# Patient Record
Sex: MALE | Race: WHITE | NOT HISPANIC OR LATINO | Employment: FULL TIME | URBAN - METROPOLITAN AREA
[De-identification: names, ages, dates, MRNs, and addresses within clinical notes are randomized per-mention and may not be internally consistent; named-entity substitution may affect disease eponyms.]

---

## 2021-01-15 ENCOUNTER — TELEMEDICINE CONVERTED (OUTPATIENT)
Dept: CARDIOLOGY | Facility: CLINIC | Age: 51
End: 2021-01-15
Attending: INTERNAL MEDICINE

## 2021-01-21 ENCOUNTER — HOSPITAL ENCOUNTER (OUTPATIENT)
Dept: INFUSION THERAPY | Facility: HOSPITAL | Age: 51
Setting detail: HOSPITAL OUTPATIENT SURGERY
Discharge: HOME | End: 2021-01-21
Attending: INTERNAL MEDICINE

## 2021-01-21 LAB
ANION GAP SERPL CALC-SCNC: 15 MMOL/L (ref 8–19)
APTT BLD: 24.2 S (ref 22.2–34.2)
BASOPHILS # BLD AUTO: 0.05 10*3/UL (ref 0–0.2)
BASOPHILS NFR BLD AUTO: 0.9 % (ref 0–3)
BUN SERPL-MCNC: 8 MG/DL (ref 5–25)
BUN/CREAT SERPL: 10 {RATIO} (ref 6–20)
CALCIUM SERPL-MCNC: 8.4 MG/DL (ref 8.7–10.4)
CHLORIDE SERPL-SCNC: 100 MMOL/L (ref 99–111)
CONV ABS IMM GRAN: 0.01 10*3/UL (ref 0–0.2)
CONV CO2: 26 MMOL/L (ref 22–32)
CONV IMMATURE GRAN: 0.2 % (ref 0–1.8)
CREAT UR-MCNC: 0.79 MG/DL (ref 0.7–1.2)
DEPRECATED RDW RBC AUTO: 44.3 FL (ref 35.1–43.9)
EOSINOPHIL # BLD AUTO: 0.21 10*3/UL (ref 0–0.7)
EOSINOPHIL # BLD AUTO: 3.6 % (ref 0–7)
ERYTHROCYTE [DISTWIDTH] IN BLOOD BY AUTOMATED COUNT: 12.4 % (ref 11.6–14.4)
GFR SERPLBLD BASED ON 1.73 SQ M-ARVRAT: >60 ML/MIN/{1.73_M2}
GLUCOSE SERPL-MCNC: 247 MG/DL (ref 70–99)
HCT VFR BLD AUTO: 47.5 % (ref 42–52)
HGB BLD-MCNC: 16.5 G/DL (ref 14–18)
INR PPP: 0.93 (ref 2–3)
LYMPHOCYTES # BLD AUTO: 2.39 10*3/UL (ref 1–5)
LYMPHOCYTES NFR BLD AUTO: 41.1 % (ref 20–45)
MCH RBC QN AUTO: 33.5 PG (ref 27–31)
MCHC RBC AUTO-ENTMCNC: 34.7 G/DL (ref 33–37)
MCV RBC AUTO: 96.3 FL (ref 80–96)
MONOCYTES # BLD AUTO: 0.64 10*3/UL (ref 0.2–1.2)
MONOCYTES NFR BLD AUTO: 11 % (ref 3–10)
NEUTROPHILS # BLD AUTO: 2.52 10*3/UL (ref 2–8)
NEUTROPHILS NFR BLD AUTO: 43.2 % (ref 30–85)
NRBC CBCN: 0 % (ref 0–0.7)
OSMOLALITY SERPL CALC.SUM OF ELEC: 291 MOSM/KG (ref 273–304)
PLATELET # BLD AUTO: 255 10*3/UL (ref 130–400)
PMV BLD AUTO: 9.3 FL (ref 9.4–12.4)
POTASSIUM SERPL-SCNC: 4 MMOL/L (ref 3.5–5.3)
PROTHROMBIN TIME: 10.2 S (ref 9.4–12)
RBC # BLD AUTO: 4.93 10*6/UL (ref 4.7–6.1)
SODIUM SERPL-SCNC: 137 MMOL/L (ref 135–147)
WBC # BLD AUTO: 5.82 10*3/UL (ref 4.8–10.8)

## 2021-03-15 ENCOUNTER — OFFICE VISIT CONVERTED (OUTPATIENT)
Dept: CARDIOLOGY | Facility: CLINIC | Age: 51
End: 2021-03-15
Attending: INTERNAL MEDICINE

## 2021-05-14 VITALS
HEART RATE: 92 BPM | SYSTOLIC BLOOD PRESSURE: 152 MMHG | WEIGHT: 155 LBS | DIASTOLIC BLOOD PRESSURE: 92 MMHG | BODY MASS INDEX: 24.33 KG/M2 | HEIGHT: 67 IN

## 2021-05-14 NOTE — PROGRESS NOTES
Progress Note      Patient Name: Demetrio Mariano   Patient ID: [id number]   Sex: Male   YOB: 1970    Primary Care Provider: Kia Mayorga   Referring Provider: Kia Mayorga    Visit Date: March 15, 2021    Provider: Tommy Nagel MD   Location: [facility]   Location Address: [address] [place] [address], [address]   [place]  [id number]   Location Phone: [phone]          Chief Complaint     Followup visit for cardiomyopathy and congestive heart failure.       History Of Present Illness  REFERRING CARE PROVIDER: Kia Mariano is a 50 year old /White male with a dilated cardiomyopathy, nonobstructive coronary artery disease and diabetes mellitus who is here for follow-up visit. He was previously seen in December during hospital visit for congestive heart failure and uncontrolled hypertension. Echo showed left ventricular ejection fraction of 25 to 30%. He was started on appropriate medical therapy and since then he underwent cardiac catheterization. Cardiac cath done in January showed 70% stenosis involving diagonal branch but no other lesions. The cardiomyopathy is nonischemic and medical therapy was continued. Today, the patient reports feeling fine. He has shortness of breath on moderate exertion but no chest pain, palpitations, or pedal edema. There are no symptoms suggestive of orthopnea. Blood pressure remains on the higher side.   PAST MEDICAL HISTORY: 1) Dilated cardiomyopathy with LV ejection fraction of 20-25%. 2) Nonobstructive coronary artery disease involving diagonal branch per cardiac catheterization done on 01/21/2021. 3) Diabetes mellitus, on insulin pump. 4) History of medication noncompliance.   PSYCHOSOCIAL HISTORY: Moderate use of alcohol. Currently smokes 3/4 pack a day.   CURRENT MEDICATIONS: Medication list was reviewed and is as documented.      ALLERGIES: No known drug allergies.       Review of  "Systems  · Cardiovascular  o Admits  o : shortness of breath while walking or lying flat  o Denies  o : palpitations (fast, fluttering, or skipping beats), swelling (feet, ankles, hands), chest pain or angina pectoris   · Respiratory  o Denies  o : chronic or frequent cough      Vitals  Date Time BP Position Site L\R Cuff Size HR RR TEMP (F) WT  HT  BMI kg/m2 BSA m2 O2 Sat FR L/min FiO2 HC       03/15/2021 10:58 /92 Sitting    92 - R   155lbs 0oz 5'  7\" 24.28 1.82       03/15/2021 10:58 /96 Sitting    96 - R                   Physical Examination  · Respiratory  o Auscultation of Lungs  o : Clear to auscultation bilaterally. No crackles or rhonchi.  · Cardiovascular  o Heart  o : S1, S2 is normally heard. No S3. No murmur, rubs, or gallops.  · Gastrointestinal  o Abdominal Examination  o : Soft, nontender, nondistended. No free fluid. Bowel sounds heard in all four quadrants.  · Extremities  o Extremities  o : Warm and well perfused. No pitting pedal edema. Distal pulses present.          Assessment     1.  Cardiomyopathy. Left ventricular ejection fraction is 20 to 25%, currently not volume overloaded. We will maximize medical therapy. Today, we will increase carvedilol dose to 12.5 mg p.o. twice daily. Continue Entresto 49/51 mg twice daily along with Lasix. We will check repeat echocardiogram in May of this year to reassess the left ventricular function.   2.  Hypertension. Blood pressure is on the higher side. Increasing the dose of carvedilol as mentioned above.   3.  Nonobstructive coronary artery disease, stable with no angina, continue aspirin. Statin should be initiated as the next medication change.   4.  We will follow with repeat echocardiogram.           Tommy Nagel MD  JV/vh               Electronically Signed by: Katiuska Canchola-, OT -Author on April 1, 2021 09:28:34 AM  Electronically Co-signed by: Tommy Nagel MD -Reviewer on April 12, 2021 08:23:00 AM  "

## 2021-05-14 NOTE — PROGRESS NOTES
Progress Note      Patient Name: Demetrio Mariano   Patient ID: [id number]   Sex: Male   YOB: 1970        Visit Date: January 15, 2021    Provider: Tommy Nagel MD   Location: [facility]   Location Address: [address] [place] [address], [address]   [place]  [id number]   Location Phone: [phone]          Chief Complaint  · Cardiomyopathy   · Congestive heart failure   · Followup of recent hospital stay      History Of Present Illness  TELEHEALTH TELEPHONE VISIT  Demetrio Mariano is a 50-year-old male who is presenting for evaluation via telehealth telephone visit. Verbal consent obtained before beginning visit. The patient has history of insulin-dependent diabetes mellitus, hypertension, and anxiety disorder and was recently admitted to the hospital because of flash pulmonary edema. Further workup showed cardiomyopathy with LV ejection fraction of 20%. He was started on appropriate medications and was discharged home. A cardiac catheterization was planned before discharge, however, he tested positive for Covid, hence this elective procedure was rescheduled. The patient reports that he is feeling fine. No major shortness of breath at rest. Currently no orthopnea, pedal edema, or rapid weight gain, however, he gets short of breath very easily, even when walking 100 yards while going to work. He has tested negative for Covid twice since then and he is back to regular work. Denies any chest pain.   Provider spent 6 minutes with the patient during the telehealth visit.   The following staff were present during this visit: Provider only.   Past Medical History/ Overview of Patient Symptoms  PAST MEDICAL HISTORY: (1) Cardiomyopathy with LV ejection fraction of 20-25%. (2) Diabetes mellitus, on insulin pump. (3) History of medication noncompliance.   PSYCHOSOCIAL HISTORY: Current tobacco use. Still smokes 1/4 pack of cigarettes per day. Denies any significant alcohol use. No recreational  drug usage.   CURRENT MEDICATIONS: Novolog; aspirin 81 mg daily; Carvedilol 3.125 mg b.i.d.; Furosemide 20 mg daily; Losartan 50 mg daily.      ALLERGIES:  No known drug allergies.       Review of Systems  · Cardiovascular  o Admits  o : shortness of breath while walking or lying flat  o Denies  o : palpitations (fast, fluttering, or skipping beats), swelling (feet, ankles, hands), chest pain or angina pectoris   · Respiratory  o Denies  o : chronic or frequent cough      Vitals     Per patient, blood pressure 130-135/95, heart rate 100, oxygen saturation 96%.           Assessment     ASSESSMENT AND PLAN:  Cardiomyopathy. LV ejection fraction 20-25%. Currently on appropriate medical management. Needs up-titration of dosing. Etiology is unclear. The patient is a diabetic and has risk factors of coronary artery disease.  Will need a cardiac catheterization and coronary angiography to rule out coronary artery disease. The risks, benefits, and alternatives of the tests were explained to the patient and he agreed to proceed. We will schedule the test at the earliest. Since the patient had two recent negative Covid-19 tests, another Covid test is not required before the procedure. Continue beta-blockers, Lasix, and Losartan at the current dose.       Tommy Nagel MD  JV/pap             Electronically Signed by: Annita Abel-, Other -Author on January 20, 2021 02:15:02 PM  Electronically Co-signed by: Tommy Nagel MD -Reviewer on January 20, 2021 03:11:28 PM

## 2021-05-18 ENCOUNTER — OFFICE VISIT CONVERTED (OUTPATIENT)
Dept: CARDIOLOGY | Facility: CLINIC | Age: 51
End: 2021-05-18
Attending: INTERNAL MEDICINE

## 2021-05-18 ENCOUNTER — CONVERSION ENCOUNTER (OUTPATIENT)
Dept: CARDIOLOGY | Facility: CLINIC | Age: 51
End: 2021-05-18

## 2021-06-02 ENCOUNTER — TRANSCRIBE ORDERS (OUTPATIENT)
Dept: ADMINISTRATIVE | Facility: HOSPITAL | Age: 51
End: 2021-06-02

## 2021-06-02 ENCOUNTER — OFFICE VISIT CONVERTED (OUTPATIENT)
Dept: CARDIOLOGY | Facility: CLINIC | Age: 51
End: 2021-06-02
Attending: INTERNAL MEDICINE

## 2021-06-02 DIAGNOSIS — I42.8 NON-ISCHEMIC CARDIOMYOPATHY (HCC): Primary | ICD-10-CM

## 2021-06-02 DIAGNOSIS — R94.30 EJECTION FRACTION < 50%: ICD-10-CM

## 2021-06-02 PROBLEM — IMO0002 EJECTION FRACTION < 50%: Status: ACTIVE | Noted: 2021-06-02

## 2021-06-05 NOTE — PROGRESS NOTES
"   Progress Note      Patient Name: Demetrio Mariano   Patient ID: [id number]   Sex: Male   YOB: 1970    Primary Care Provider: Kia Mayorga   Referring Provider: Kia Mayorga    Visit Date: June 2, 2021    Provider: Regulo Coyne MD   Location: [facility]   Location Address: [address] [place] [address], [address]   [place], [place]  [id number]   Location Phone: [phone]          Chief Complaint     CHF.       History Of Present Illness  REFERRING CARE PROVIDER: Kia Mukesh   Demetrio Mariano is a 50 year old /White male with known nonischemic dilated cardiomyopathy, nonobstructive coronary artery disease and diabetes who has been undergoing medical therapy for the last 6 months and had stable shortness of breath on exertion as well as at times atypical chest pain.   PAST MEDICAL HISTORY: 1) Dilated cardiomyopathy with LV ejection fraction of 20-25%. 2) Nonobstructive coronary artery disease involving diagonal branch per cardiac catheterization done on 01/21/2021. 3) Diabetes mellitus, on insulin pump. 4) History of medication noncompliance.   PSYCHOSOCIAL HISTORY: Consumes alcohol daily. Currently smokes 1/2 pack per day.   CURRENT MEDICATIONS: Medications have been reviewed and are as documented.      ALLERGIES:  No known drug allergies.       Review of Systems  · Cardiovascular  o Admits  o : shortness of breath while walking or lying flat, chest pain or angina pectoris   o Denies  o : palpitations (fast, fluttering, or skipping beats), swelling (feet, ankles, hands)  · Respiratory  o Denies  o : chronic or frequent cough      Vitals  Date Time BP Position Site L\R Cuff Size HR RR TEMP (F) WT  HT  BMI kg/m2 BSA m2 O2 Sat FR L/min FiO2 HC       06/02/2021 09:46 AM         157lbs 16oz 5'  7\" 24.75 1.84             Physical Examination  · Constitutional  o Appearance  o : Awake, alert, in no acute distress.   · Eyes  o Conjunctivae  o : Normal.  · Ears, Nose, Mouth and " Throat  o Oral Cavity  o :   § Oral Mucosa  § : Normal.  · Neck  o Inspection/Palpation  o : No JVD. Good carotid upstroke. No thyromegaly.  · Respiratory  o Respiratory  o : Good respiratory effort. Clear to percussion and auscultation.  · Cardiovascular  o Heart  o :   § Auscultation of Heart  § : S1, S2 normal. Regular rate and rhythm without murmurs, gallops, or rubs.  o Peripheral Vascular System  o :   § Extremities  § : Good femoral and pedal pulses. No pedal edema.  · Gastrointestinal  o Abdominal Examination  o : Soft. No tenderness or masses felt. No hepatosplenomegaly. Abdominal aorta is not palpable.  · EKG  o EKG  o : Baseline EKG.  o Results  o : Shows normal sinus rhythm with left axis deviation with some LVH changes.  · Echocardiogram  o Echocardiogram  o : Most recent echocardiogram done in May 2021 showed a moderately reduced ejection fraction with an EF of 28%, left atrial enlargement, and mild LV enlargement as well.   · Data  o Data  o : He had a heart catheterization previously on 01/21/2021 showed nonocclusive coronary artery disease.           Assessment     Nonischemic cardiomyopathy with class 2 symptoms and persistently decreased ejection fraction below 30% despite maximum medical treatment including beta blocker, Entresto, and Lasix therapy. I feel that the patient does meet criteria for prophylactic ICD implantation. Discussed both transvenous as well as subcutaneous approaches. He preferred the transvenous due to concerns over the newer subcutaneous route. Will plan on proceeding in 2 weeks with a single chamber transvenous ICD.         Regulo Coyne MD  /               Electronically Signed by: Regulo Coyne MD -Author on [place] 3, 2021 01:32:28 PM

## 2021-06-05 NOTE — PROGRESS NOTES
Progress Note      Patient Name: Demetrio Mariano   Patient ID: [id number]   Sex: Male   YOB: 1970    Primary Care Provider: Kia Mayorga   Referring Provider: Kia Mayorga    Visit Date: May 18, 2021    Provider: Tommy Nagel MD   Location: [facility]   Location Address: [address] [place] [address], [address]   [place]  [id number]   Location Phone: [phone]          Chief Complaint     Followup visit for cardiomyopathy and congestive heart failure.       History Of Present Illness  REFERRING CARE PROVIDER: Kia Jiangtcher   Demetrio Mariano is a 50 year old /White male with dilated cardiomyopathy, nonobstructive coronary artery disease, diabetes mellitus who is here for a follow-up visit. He was previously seen on 03/15 and the dose of carvedilol was increased during that visit. Since then the patient has had repeat echocardiogram which again showed left ventricular ejection fraction of 25 to 30%. Today, the patient reports feeling fine, denies having any palpitations, chest pain, swelling of the feet. He does have shortness of breath on moderate exertion. He denies having any orthopnea. No recent rapid weight gain.   PAST MEDICAL HISTORY: 1) Dilated cardiomyopathy with LV ejection fraction of 20-25%. 2) Nonobstructive coronary artery disease involving diagonal branch per cardiac catheterization done on 01/21/2021. 3) Diabetes mellitus, on insulin pump. 4) History of medication noncompliance.   PSYCHOSOCIAL HISTORY: Moderate alcohol consumption. Currently smokes one pack per day.   CURRENT MEDICATIONS: Medications have been reviewed and are as stated.      ALLERGIES:  No known drug allergies.       Review of Systems  · Cardiovascular  o Admits  o : shortness of breath while walking or lying flat  o Denies  o : palpitations (fast, fluttering, or skipping beats), swelling (feet, ankles, hands), chest pain or angina pectoris   · Respiratory  o Denies  o : chronic or  "frequent cough      Vitals  Date Time BP Position Site L\R Cuff Size HR RR TEMP (F) WT  HT  BMI kg/m2 BSA m2 O2 Sat FR L/min FiO2 HC       05/18/2021 01:00 /78 Sitting    92 - R   159lbs 0oz 5'  7\" 24.9 1.85       05/18/2021 01:00 /84 Sitting                       Physical Examination  · Respiratory  o Auscultation of Lungs  o : Clear to auscultation bilaterally. No crackles or rhonchi.  · Cardiovascular  o Heart  o : S1, S2 is normally heard. No S3. No murmur, rubs, or gallops.  · Gastrointestinal  o Abdominal Examination  o : Soft, nontender, nondistended. No free fluid. Bowel sounds heard in all four quadrants.  · Extremities  o Extremities  o : Warm and well perfused. No pitting pedal edema. Distal pulses present.  · Echocardiogram  o Echocardiogram  o : Done on 05/03/2021 showed LV ejection fraction of 28%, grade 1 diastolic dysfunction, left atrial enlargement.           Assessment     1.  Nonischemic dilated cardiomyopathy. The patient is on appropriate medical therapy for close to 6 months. Left ventricular ejection fraction is still under 30%. He is not volume overloaded, however, he has been advised of class 2 symptoms. Because of persistently low ejection fraction on appropriate medical therapy he will be a candidate for ICD placement for primary prevention. The risks and benefits were briefly explained to the patient. We will make an appointment with Dr. Coyne to discuss regarding the ICD placement and risks and benefits of placement. In the meantime, we will increase the dose of carvedilol further to 25 mg twice daily and continue Entresto as it is. Continue Lasix at the current dose.  2.  Hypertension. Blood pressure is reasonably well controlled. Medication changes as mentioned above.    FOLLOW-UP: Follow-up with me in 6 months, but will be seen by Dr. Coyne in-between.        Tommy Nagel MD  JMILES/vh               Electronically Signed by: Katiuska Canchola-, OT -Author on May 30, " 2021 05:45:57 AM  Electronically Co-signed by: Tommy Nagel MD -Reviewer on May 30, 2021 02:52:15 PM

## 2021-06-15 ENCOUNTER — HOSPITAL ENCOUNTER (OUTPATIENT)
Facility: HOSPITAL | Age: 51
Discharge: HOME | End: 2021-06-16
Attending: INTERNAL MEDICINE | Admitting: INTERNAL MEDICINE

## 2021-06-15 ENCOUNTER — APPOINTMENT (OUTPATIENT)
Dept: GENERAL RADIOLOGY | Facility: HOSPITAL | Age: 51
End: 2021-06-15

## 2021-06-15 ENCOUNTER — APPOINTMENT (OUTPATIENT)
Dept: INFUSION THERAPY | Facility: HOSPITAL | Age: 51
End: 2021-06-15

## 2021-06-15 DIAGNOSIS — I42.8 NON-ISCHEMIC CARDIOMYOPATHY (HCC): ICD-10-CM

## 2021-06-15 DIAGNOSIS — R94.30 EJECTION FRACTION < 50%: ICD-10-CM

## 2021-06-15 LAB
ANION GAP SERPL CALCULATED.3IONS-SCNC: 8 MMOL/L (ref 5–15)
BASOPHILS # BLD AUTO: 0.06 10*3/MM3 (ref 0–0.2)
BASOPHILS NFR BLD AUTO: 0.7 % (ref 0–1.5)
BUN SERPL-MCNC: 11 MG/DL (ref 6–20)
BUN/CREAT SERPL: 13.3 (ref 7–25)
CALCIUM SPEC-SCNC: 9.2 MG/DL (ref 8.6–10.5)
CHLORIDE SERPL-SCNC: 99 MMOL/L (ref 98–107)
CO2 SERPL-SCNC: 31 MMOL/L (ref 22–29)
CREAT SERPL-MCNC: 0.83 MG/DL (ref 0.76–1.27)
DEPRECATED RDW RBC AUTO: 47.2 FL (ref 37–54)
EOSINOPHIL # BLD AUTO: 0.27 10*3/MM3 (ref 0–0.4)
EOSINOPHIL NFR BLD AUTO: 3.3 % (ref 0.3–6.2)
ERYTHROCYTE [DISTWIDTH] IN BLOOD BY AUTOMATED COUNT: 13.5 % (ref 12.3–15.4)
GFR SERPL CREATININE-BSD FRML MDRD: 98 ML/MIN/1.73
GLUCOSE BLDC GLUCOMTR-MCNC: 239 MG/DL (ref 70–130)
GLUCOSE BLDC GLUCOMTR-MCNC: 326 MG/DL (ref 70–130)
GLUCOSE SERPL-MCNC: 304 MG/DL (ref 65–99)
HCT VFR BLD AUTO: 56 % (ref 37.5–51)
HGB BLD-MCNC: 19.3 G/DL (ref 13–17.7)
IMM GRANULOCYTES # BLD AUTO: 0.02 10*3/MM3 (ref 0–0.05)
IMM GRANULOCYTES NFR BLD AUTO: 0.2 % (ref 0–0.5)
INR PPP: 0.97 (ref 2–3)
LYMPHOCYTES # BLD AUTO: 2.65 10*3/MM3 (ref 0.7–3.1)
LYMPHOCYTES NFR BLD AUTO: 32.7 % (ref 19.6–45.3)
MCH RBC QN AUTO: 32.3 PG (ref 26.6–33)
MCHC RBC AUTO-ENTMCNC: 34.5 G/DL (ref 31.5–35.7)
MCV RBC AUTO: 93.8 FL (ref 79–97)
MONOCYTES # BLD AUTO: 0.88 10*3/MM3 (ref 0.1–0.9)
MONOCYTES NFR BLD AUTO: 10.9 % (ref 5–12)
NEUTROPHILS NFR BLD AUTO: 4.22 10*3/MM3 (ref 1.7–7)
NEUTROPHILS NFR BLD AUTO: 52.2 % (ref 42.7–76)
NRBC BLD AUTO-RTO: 0 /100 WBC (ref 0–0.2)
PLATELET # BLD AUTO: 209 10*3/MM3 (ref 140–450)
PMV BLD AUTO: 10 FL (ref 6–12)
POTASSIUM SERPL-SCNC: 4.5 MMOL/L (ref 3.5–5.2)
PROTHROMBIN TIME: 10.7 SECONDS (ref 9.4–12)
QT INTERVAL: 405 MS
RBC # BLD AUTO: 5.97 10*6/MM3 (ref 4.14–5.8)
SODIUM SERPL-SCNC: 138 MMOL/L (ref 136–145)
WBC # BLD AUTO: 8.1 10*3/MM3 (ref 3.4–10.8)

## 2021-06-15 PROCEDURE — 82962 GLUCOSE BLOOD TEST: CPT

## 2021-06-15 PROCEDURE — 85025 COMPLETE CBC W/AUTO DIFF WBC: CPT | Performed by: INTERNAL MEDICINE

## 2021-06-15 PROCEDURE — 25010000003 CEFAZOLIN IN DEXTROSE 2-4 GM/100ML-% SOLUTION: Performed by: INTERNAL MEDICINE

## 2021-06-15 PROCEDURE — 33249 INSJ/RPLCMT DEFIB W/LEAD(S): CPT | Performed by: INTERNAL MEDICINE

## 2021-06-15 PROCEDURE — C1777 LEAD, AICD, ENDO SINGLE COIL: HCPCS | Performed by: INTERNAL MEDICINE

## 2021-06-15 PROCEDURE — C1892 INTRO/SHEATH,FIXED,PEEL-AWAY: HCPCS | Performed by: INTERNAL MEDICINE

## 2021-06-15 PROCEDURE — 25010000002 MIDAZOLAM PER 1MG: Performed by: INTERNAL MEDICINE

## 2021-06-15 PROCEDURE — 93005 ELECTROCARDIOGRAM TRACING: CPT | Performed by: INTERNAL MEDICINE

## 2021-06-15 PROCEDURE — 85610 PROTHROMBIN TIME: CPT | Performed by: INTERNAL MEDICINE

## 2021-06-15 PROCEDURE — 0 IOPAMIDOL PER 1 ML: Performed by: INTERNAL MEDICINE

## 2021-06-15 PROCEDURE — 25010000002 LORAZEPAM PER 2 MG: Performed by: INTERNAL MEDICINE

## 2021-06-15 PROCEDURE — 71045 X-RAY EXAM CHEST 1 VIEW: CPT

## 2021-06-15 PROCEDURE — C1894 INTRO/SHEATH, NON-LASER: HCPCS | Performed by: INTERNAL MEDICINE

## 2021-06-15 PROCEDURE — 25010000002 FENTANYL CITRATE (PF) 50 MCG/ML SOLUTION: Performed by: INTERNAL MEDICINE

## 2021-06-15 PROCEDURE — C1722 AICD, SINGLE CHAMBER: HCPCS | Performed by: INTERNAL MEDICINE

## 2021-06-15 PROCEDURE — 80048 BASIC METABOLIC PNL TOTAL CA: CPT | Performed by: INTERNAL MEDICINE

## 2021-06-15 PROCEDURE — 25010000002 MIDAZOLAM PER 1 MG: Performed by: INTERNAL MEDICINE

## 2021-06-15 DEVICE — IMPLANTABLE DEVICE: Type: IMPLANTABLE DEVICE | Status: FUNCTIONAL

## 2021-06-15 DEVICE — IMPLANTABLE CARDIOVERTER DEFIBRILLATOR VR
Type: IMPLANTABLE DEVICE | Status: FUNCTIONAL
Brand: VIGILANT™ EL ICD VR

## 2021-06-15 RX ORDER — CEFAZOLIN SODIUM 2 G/100ML
2 INJECTION, SOLUTION INTRAVENOUS ONCE
Status: COMPLETED | OUTPATIENT
Start: 2021-06-15 | End: 2021-06-16

## 2021-06-15 RX ORDER — ACETAMINOPHEN 650 MG/1
650 SUPPOSITORY RECTAL EVERY 4 HOURS PRN
Status: DISCONTINUED | OUTPATIENT
Start: 2021-06-15 | End: 2021-06-16 | Stop reason: HOSPADM

## 2021-06-15 RX ORDER — ACETAMINOPHEN 325 MG/1
650 TABLET ORAL EVERY 4 HOURS PRN
Status: DISCONTINUED | OUTPATIENT
Start: 2021-06-15 | End: 2021-06-16 | Stop reason: HOSPADM

## 2021-06-15 RX ORDER — DEXTROSE MONOHYDRATE 100 MG/ML
25 INJECTION, SOLUTION INTRAVENOUS
Status: DISCONTINUED | OUTPATIENT
Start: 2021-06-15 | End: 2021-06-16 | Stop reason: HOSPADM

## 2021-06-15 RX ORDER — MIDAZOLAM HYDROCHLORIDE 1 MG/ML
INJECTION INTRAMUSCULAR; INTRAVENOUS AS NEEDED
Status: DISCONTINUED | OUTPATIENT
Start: 2021-06-15 | End: 2021-06-15 | Stop reason: HOSPADM

## 2021-06-15 RX ORDER — CEFAZOLIN SODIUM 2 G/100ML
2 INJECTION, SOLUTION INTRAVENOUS EVERY 8 HOURS
Status: COMPLETED | OUTPATIENT
Start: 2021-06-15 | End: 2021-06-16

## 2021-06-15 RX ORDER — FENTANYL CITRATE 50 UG/ML
INJECTION, SOLUTION INTRAMUSCULAR; INTRAVENOUS AS NEEDED
Status: DISCONTINUED | OUTPATIENT
Start: 2021-06-15 | End: 2021-06-15 | Stop reason: HOSPADM

## 2021-06-15 RX ORDER — BUPIVACAINE HYDROCHLORIDE 5 MG/ML
INJECTION, SOLUTION EPIDURAL; INTRACAUDAL AS NEEDED
Status: DISCONTINUED | OUTPATIENT
Start: 2021-06-15 | End: 2021-06-15 | Stop reason: HOSPADM

## 2021-06-15 RX ORDER — ONDANSETRON 2 MG/ML
4 INJECTION INTRAMUSCULAR; INTRAVENOUS EVERY 6 HOURS PRN
Status: DISCONTINUED | OUTPATIENT
Start: 2021-06-15 | End: 2021-06-16 | Stop reason: HOSPADM

## 2021-06-15 RX ORDER — NICOTINE POLACRILEX 4 MG
15 LOZENGE BUCCAL
Status: DISCONTINUED | OUTPATIENT
Start: 2021-06-15 | End: 2021-06-16 | Stop reason: HOSPADM

## 2021-06-15 RX ORDER — MIDAZOLAM HYDROCHLORIDE 2 MG/2ML
INJECTION, SOLUTION INTRAMUSCULAR; INTRAVENOUS AS NEEDED
Status: DISCONTINUED | OUTPATIENT
Start: 2021-06-15 | End: 2021-06-15 | Stop reason: HOSPADM

## 2021-06-15 RX ORDER — SODIUM CHLORIDE 0.9 % (FLUSH) 0.9 %
3 SYRINGE (ML) INJECTION EVERY 12 HOURS SCHEDULED
Status: DISCONTINUED | OUTPATIENT
Start: 2021-06-15 | End: 2021-06-16 | Stop reason: HOSPADM

## 2021-06-15 RX ORDER — FUROSEMIDE 20 MG/1
20 TABLET ORAL DAILY
COMMUNITY
End: 2022-05-31 | Stop reason: SDUPTHER

## 2021-06-15 RX ORDER — SACUBITRIL AND VALSARTAN 49; 51 MG/1; MG/1
1 TABLET, FILM COATED ORAL 2 TIMES DAILY
COMMUNITY
End: 2021-07-08 | Stop reason: SDUPTHER

## 2021-06-15 RX ORDER — LIDOCAINE HYDROCHLORIDE 20 MG/ML
INJECTION, SOLUTION INFILTRATION; PERINEURAL AS NEEDED
Status: DISCONTINUED | OUTPATIENT
Start: 2021-06-15 | End: 2021-06-15 | Stop reason: HOSPADM

## 2021-06-15 RX ORDER — LORAZEPAM 2 MG/ML
INJECTION INTRAMUSCULAR AS NEEDED
Status: DISCONTINUED | OUTPATIENT
Start: 2021-06-15 | End: 2021-06-15 | Stop reason: HOSPADM

## 2021-06-15 RX ORDER — TEMAZEPAM 15 MG/1
15 CAPSULE ORAL NIGHTLY PRN
Status: DISCONTINUED | OUTPATIENT
Start: 2021-06-15 | End: 2021-06-16 | Stop reason: HOSPADM

## 2021-06-15 RX ORDER — SODIUM CHLORIDE 0.9 % (FLUSH) 0.9 %
10 SYRINGE (ML) INJECTION AS NEEDED
Status: DISCONTINUED | OUTPATIENT
Start: 2021-06-15 | End: 2021-06-16 | Stop reason: HOSPADM

## 2021-06-15 RX ORDER — CARVEDILOL 25 MG/1
25 TABLET ORAL 2 TIMES DAILY WITH MEALS
COMMUNITY
End: 2021-11-29

## 2021-06-15 RX ADMIN — CEFAZOLIN SODIUM 2 G: 2 INJECTION, SOLUTION INTRAVENOUS at 16:58

## 2021-06-15 RX ADMIN — Medication 3.5 UNITS: at 17:49

## 2021-06-15 RX ADMIN — CEFAZOLIN SODIUM 2 G: 2 INJECTION, SOLUTION INTRAVENOUS at 08:54

## 2021-06-15 NOTE — PLAN OF CARE
Goal Outcome Evaluation:  Plan of Care Reviewed With: patient        Progress: improving  Outcome Summary: Patient received ICD, heart rhythm remains sinus rhythm in the 80s. Site covered with dressing and intact. No complications noted at this time.

## 2021-06-15 NOTE — H&P
"Seen and agree with above  Progress Note        Patient Name: Demetrio Mariano   Patient ID: [id number]   Sex: Male   YOB: 1970    Primary Care Provider: Kia Mayorga   Referring Provider: Kia Mayorga    Visit Date: June 2, 2021    Provider: Regulo Coyne MD   Location: [facility]   Location Address: [address] [place] [address], [address]   [place], [place]  [id number]   Location Phone: [phone]           Chief Complaint      CHF.        History Of Present Illness  REFERRING CARE PROVIDER: Kiaaziza JiangMukesh   Demetrio Mariano is a 50 year old /White male with known nonischemic dilated cardiomyopathy, nonobstructive coronary artery disease and diabetes who has been undergoing medical therapy for the last 6 months and had stable shortness of breath on exertion as well as at times atypical chest pain.   PAST MEDICAL HISTORY: 1) Dilated cardiomyopathy with LV ejection fraction of 20-25%. 2) Nonobstructive coronary artery disease involving diagonal branch per cardiac catheterization done on 01/21/2021. 3) Diabetes mellitus, on insulin pump. 4) History of medication noncompliance.   PSYCHOSOCIAL HISTORY: Consumes alcohol daily. Currently smokes 1/2 pack per day.   CURRENT MEDICATIONS: Medications have been reviewed and are as documented.       ALLERGIES:  No known drug allergies.        Review of Systems  · Cardiovascular  · Admits  · : shortness of breath while walking or lying flat, chest pain or angina pectoris   · Denies  · : palpitations (fast, fluttering, or skipping beats), swelling (feet, ankles, hands)  · Respiratory  · Denies  · : chronic or frequent cough       Vitals                                       Date Time BP Position Site L\R Cuff Size HR RR TEMP (F) WT  HT  BMI kg/m2 BSA m2 O2 Sat FR L/min FiO2         06/02/2021 09:46 AM                 157lbs 16oz 5'  7\" 24.75 1.84                   Physical Examination  · Constitutional  · Appearance  · : Awake, alert, in no acute " distress.   · Eyes  · Conjunctivae  · : Normal.  · Ears, Nose, Mouth and Throat  · Oral Cavity  · :   · Oral Mucosa  · : Normal.  · Neck  · Inspection/Palpation  · : No JVD. Good carotid upstroke. No thyromegaly.  · Respiratory  · Respiratory  · : Good respiratory effort. Clear to percussion and auscultation.  · Cardiovascular  · Heart  · :   · Auscultation of Heart  · : S1, S2 normal. Regular rate and rhythm without murmurs, gallops, or rubs.  · Peripheral Vascular System  · :   · Extremities  · : Good femoral and pedal pulses. No pedal edema.  · Gastrointestinal  · Abdominal Examination  · : Soft. No tenderness or masses felt. No hepatosplenomegaly. Abdominal aorta is not palpable.  · EKG  · EKG  · : Baseline EKG.  · Results  · : Shows normal sinus rhythm with left axis deviation with some LVH changes.  · Echocardiogram  · Echocardiogram  · : Most recent echocardiogram done in May 2021 showed a moderately reduced ejection fraction with an EF of 28%, left atrial enlargement, and mild LV enlargement as well.   · Data  · Data  · : He had a heart catheterization previously on 01/21/2021 showed nonocclusive coronary artery disease.             Assessment      Nonischemic cardiomyopathy with class 2 symptoms and persistently decreased ejection fraction below 30% despite maximum medical treatment including beta blocker, Entresto, and Lasix therapy. I feel that the patient does meet criteria for prophylactic ICD implantation. Discussed both transvenous as well as subcutaneous approaches. He preferred the transvenous due to concerns over the newer subcutaneous route. Will plan on proceeding in 2 weeks with a single chamber transvenous ICD.         Regulo Coyne MD  /                 Electronically Signed by: Regulo Coyne MD -Author on [place] 3, 2021 01:32:28 PM               Last signed by: Regulo Coyne MD at 06/02/21 4202     Seen and agree with above no changes

## 2021-06-16 VITALS
OXYGEN SATURATION: 98 % | HEART RATE: 83 BPM | RESPIRATION RATE: 14 BRPM | HEIGHT: 67 IN | BODY MASS INDEX: 23.54 KG/M2 | SYSTOLIC BLOOD PRESSURE: 153 MMHG | WEIGHT: 150 LBS | DIASTOLIC BLOOD PRESSURE: 80 MMHG | TEMPERATURE: 98.1 F

## 2021-06-16 LAB
GLUCOSE BLDC GLUCOMTR-MCNC: 234 MG/DL (ref 70–130)
GLUCOSE BLDC GLUCOMTR-MCNC: 68 MG/DL (ref 70–130)

## 2021-06-16 PROCEDURE — 25010000003 CEFAZOLIN IN DEXTROSE 2-4 GM/100ML-% SOLUTION: Performed by: INTERNAL MEDICINE

## 2021-06-16 PROCEDURE — 82962 GLUCOSE BLOOD TEST: CPT

## 2021-06-16 RX ORDER — CEPHALEXIN 750 MG/1
750 CAPSULE ORAL 3 TIMES DAILY
Qty: 12 CAPSULE | Refills: 0 | Status: SHIPPED | OUTPATIENT
Start: 2021-06-16 | End: 2021-09-23

## 2021-06-16 RX ADMIN — CEFAZOLIN SODIUM 2 G: 2 INJECTION, SOLUTION INTRAVENOUS at 00:16

## 2021-06-16 RX ADMIN — SODIUM CHLORIDE, PRESERVATIVE FREE 3 ML: 5 INJECTION INTRAVENOUS at 10:24

## 2021-06-17 NOTE — PROCEDURES
Pacemaker procedure note     Procedure: Single-chamber ICD     Indication: Nonischemic cardiomyopathy EF less than 30%     Complications: None     Description: Written informed consent was obtained.  The patient was brought to the cardiac catheterization lab in a fasting state.  Prepped and draped sterilely for left subclavian access.  Preoperative antibiotics were administered.  Local anesthesia was infiltrated around the subclavian area and conscious sedation was administered.  An iv venogram was done to confirm patency of the axillary vein. An incision was then made and carried down to the pectoral fascial plane.  A pocket was made in the pectoral fascial plane with both electrocautery and blunt dissection.  Using a micropuncture kit I gained access once to the left subclavian/axillary vein and J-tipped guidewires were placed.  One 8 [place] sheath were placed over the wires and the wires removed.  Under fluoroscopy the right ventricular lead was delivered to the right ventricular septal area using a combination of straight and curved stylets.  The pacing and sensing parameters were acceptable.  The slack was optimized.  The sheath was removed.  The lead was sewn into the pocket using 0-silk suture. The lead were connected to the pulse generator and the torque screws were tightened.  The pocket was washed with normal saline solution.  Hemostasis appeared excellent.  The lead slack and generator were placed into the pocket and the pocket was closed with a  2-0 vicryl layer for the fasica and 4-0 Vicryl suture for the subcuticular layer.  Counts were correct.  The patient tolerated the procedure well.  There were no early complications.       Blood loss minimal  Device implanted: [employer] vigilant model number D232        Right ventricular lead: [place] active-fixation model #0672     Ventricular sensing 8.7 mV     Threshold was 0.0.5 V at 0.4 ms pacing mode VV I low rate limit of  40        Conclusions:  Single-chamber ICD implantation  Management plan:  Monitored overnight will get chest x-ray and repeat interrogation in a.m.     The patient will be observed overnight, the device will be interrogated in the morning, outpatient follow-up will be arranged

## 2021-06-23 ENCOUNTER — CLINICAL SUPPORT NO REQUIREMENTS (OUTPATIENT)
Dept: CARDIOLOGY | Facility: CLINIC | Age: 51
End: 2021-06-23

## 2021-06-23 DIAGNOSIS — I42.8 NON-ISCHEMIC CARDIOMYOPATHY (HCC): ICD-10-CM

## 2021-06-23 DIAGNOSIS — Z95.810 AICD (AUTOMATIC CARDIOVERTER/DEFIBRILLATOR) PRESENT: ICD-10-CM

## 2021-06-23 DIAGNOSIS — I48.21 PERMANENT ATRIAL FIBRILLATION (HCC): Primary | ICD-10-CM

## 2021-06-23 PROCEDURE — 93283 PRGRMG EVAL IMPLANTABLE DFB: CPT | Performed by: INTERNAL MEDICINE

## 2021-07-11 RX ORDER — SACUBITRIL AND VALSARTAN 49; 51 MG/1; MG/1
TABLET, FILM COATED ORAL
Qty: 60 TABLET | Refills: 11 | Status: SHIPPED | OUTPATIENT
Start: 2021-07-11 | End: 2021-09-23 | Stop reason: DRUGHIGH

## 2021-07-15 VITALS
WEIGHT: 159 LBS | HEIGHT: 67 IN | DIASTOLIC BLOOD PRESSURE: 78 MMHG | HEART RATE: 92 BPM | BODY MASS INDEX: 24.96 KG/M2 | SYSTOLIC BLOOD PRESSURE: 142 MMHG

## 2021-07-15 VITALS — WEIGHT: 158 LBS | BODY MASS INDEX: 24.8 KG/M2 | HEIGHT: 67 IN

## 2021-08-02 ENCOUNTER — TELEPHONE (OUTPATIENT)
Dept: CARDIOLOGY | Facility: CLINIC | Age: 51
End: 2021-08-02

## 2021-08-02 NOTE — TELEPHONE ENCOUNTER
I received an alert that patients heart failure index is at 16, anything above 6 is high.  I do not see that he is on a Dieretic, however I wanted to confirm and also see if he was experiencing any problems.

## 2021-08-04 NOTE — TELEPHONE ENCOUNTER
S/W patient. Denied SOB or edema. Stated has had a 2-3 pound weight gain over the past several weeks. Patient states that he does not weigh himself daily. Advised patient that I would discuss with Dr. Nagel and call back with recommendations.    Instructed patient of importance of weighing daily. Advised patient to start weighing himself first thing in the mornings and to call with a weight gain of more than 2lbs overnight or 5 lbs within a week. Patient verbalized understanding.

## 2021-08-04 NOTE — TELEPHONE ENCOUNTER
Can you please call this patient and ask about symptoms, specifically shortness of breath, recent weight gain and pedal edema.     The heart failure index can be nonspecific and no further interventions needed if patient has no new symptoms.

## 2021-08-05 DIAGNOSIS — I42.8 NON-ISCHEMIC CARDIOMYOPATHY (HCC): Primary | ICD-10-CM

## 2021-08-06 RX ORDER — FUROSEMIDE 20 MG/1
20 TABLET ORAL DAILY
Qty: 90 TABLET | Refills: 3 | Status: SHIPPED | OUTPATIENT
Start: 2021-08-06 | End: 2021-09-23 | Stop reason: SDUPTHER

## 2021-08-06 NOTE — TELEPHONE ENCOUNTER
Recommend to restart taking Lasix 20 mg p.o. once daily.    Continue all other medications as it is      Need a repeat BMP done in 1 week after restarting Lasix.

## 2021-08-27 ENCOUNTER — TELEPHONE (OUTPATIENT)
Dept: CARDIOLOGY | Facility: CLINIC | Age: 51
End: 2021-08-27

## 2021-08-30 ENCOUNTER — TELEPHONE (OUTPATIENT)
Dept: CARDIOLOGY | Facility: CLINIC | Age: 51
End: 2021-08-30

## 2021-08-30 NOTE — TELEPHONE ENCOUNTER
Red Alert via Home remote of CHF being level 16, anything above 6 is high.  He previously was started on Lasix 20 mg qd.  Is he taking medication?  Has he gained any weight or experiencing SOB?

## 2021-08-31 NOTE — TELEPHONE ENCOUNTER
Patient returned call. Confirmed still taking Lasix 20 mg daily. Stated that he hasn't noticed any swelling but has noticed some tightness in his abdomen. Stated that his weight went up to 157 a couple of weeks ago but it is coming back down and this morning his weight was 153. Stated that his baseline weight is 151.    Advised patient that I would discuss with Dr. Nagel and call back with recommendations.

## 2021-09-22 PROBLEM — I10 HYPERTENSION, ESSENTIAL: Chronic | Status: ACTIVE | Noted: 2021-09-22

## 2021-09-22 PROBLEM — Z95.810 ICD (IMPLANTABLE CARDIOVERTER-DEFIBRILLATOR), DUAL, IN SITU: Chronic | Status: ACTIVE | Noted: 2021-09-22

## 2021-09-22 PROBLEM — I42.8 NON-ISCHEMIC CARDIOMYOPATHY: Chronic | Status: ACTIVE | Noted: 2021-05-03

## 2021-09-22 PROBLEM — I25.10 CORONARY ARTERY DISEASE INVOLVING NATIVE HEART WITHOUT ANGINA PECTORIS: Chronic | Status: ACTIVE | Noted: 2021-01-21

## 2021-09-22 PROBLEM — I25.10 CORONARY ARTERY DISEASE INVOLVING NATIVE HEART WITHOUT ANGINA PECTORIS: Chronic | Status: ACTIVE | Noted: 2021-09-22

## 2021-09-23 ENCOUNTER — OFFICE VISIT (OUTPATIENT)
Dept: CARDIOLOGY | Facility: CLINIC | Age: 51
End: 2021-09-23

## 2021-09-23 VITALS
BODY MASS INDEX: 25.11 KG/M2 | SYSTOLIC BLOOD PRESSURE: 146 MMHG | WEIGHT: 160 LBS | HEIGHT: 67 IN | DIASTOLIC BLOOD PRESSURE: 80 MMHG | HEART RATE: 81 BPM

## 2021-09-23 DIAGNOSIS — I25.10 CORONARY ARTERY DISEASE INVOLVING NATIVE HEART WITHOUT ANGINA PECTORIS, UNSPECIFIED VESSEL OR LESION TYPE: Primary | Chronic | ICD-10-CM

## 2021-09-23 DIAGNOSIS — E78.5 HYPERLIPIDEMIA, UNSPECIFIED HYPERLIPIDEMIA TYPE: ICD-10-CM

## 2021-09-23 DIAGNOSIS — F17.219 CIGARETTE NICOTINE DEPENDENCE WITH NICOTINE-INDUCED DISORDER: Chronic | ICD-10-CM

## 2021-09-23 DIAGNOSIS — I10 HYPERTENSION, ESSENTIAL: Chronic | ICD-10-CM

## 2021-09-23 DIAGNOSIS — Z95.810 ICD (IMPLANTABLE CARDIOVERTER-DEFIBRILLATOR), DUAL, IN SITU: Chronic | ICD-10-CM

## 2021-09-23 DIAGNOSIS — I42.8 NON-ISCHEMIC CARDIOMYOPATHY (HCC): Chronic | ICD-10-CM

## 2021-09-23 PROCEDURE — 99214 OFFICE O/P EST MOD 30 MIN: CPT | Performed by: NURSE PRACTITIONER

## 2021-09-23 RX ORDER — ATORVASTATIN CALCIUM 10 MG/1
10 TABLET, FILM COATED ORAL DAILY
Qty: 90 TABLET | Refills: 3 | Status: SHIPPED | OUTPATIENT
Start: 2021-09-23 | End: 2022-05-31 | Stop reason: SDUPTHER

## 2021-09-23 NOTE — ASSESSMENT & PLAN NOTE
Improving with restarting of furosemide.  We are going to try to maximize his medications by increasing Entresto 49/51 1 tab in the morning and 2 tabs at night for 1 week and then 2 tabs twice daily until the bottle is done.  Then he will start Entresto 97/103mg 1 tab twice daily.  We will check a BMP in 1 month.  He is instructed to call if he has any issues with dizziness or side effects.  Continue Lasix 20 mg a day and carvedilol 25 twice daily.    Repeat echo in 6 months.

## 2021-09-23 NOTE — ASSESSMENT & PLAN NOTE
Needs tighter control.  Increase Entresto as directed.  Continue Lasix 20 mg a day and carvedilol 25 twice daily.

## 2021-09-23 NOTE — PROGRESS NOTES
Chief Complaint  Follow-up, Non-ischemic cardiomyopathy, Coronary Artery Disease, ICD, and Hypertension    Subjective            Demetrio Mariano presents to [facility] CARDIOLOGY  50-year-old white male who states he ran out of his Lasix a while back.  Started noticing more fluid retention and increasing shortness of breath so he got back on the Lasix.  States his shortness of breath is improving. Denies chest pain, palpitations, swelling, dizziness, syncope, PND, and orthopnea.  Continues to smoke a pack a day.  States it starts and stops periodically.  He does have nicotine patches at home.  He also states he has been on a cholesterol medicine in the past but not sure why he is not on it now.  He may have been on it when he had labs done in December.  He has had both Covid vaccines.              Past History:    Past Medical History:   Diagnosis Date   • CHF (congestive heart failure) (CMS/HCC)    • Cigarette nicotine dependence with nicotine-induced disorder 9/23/2021   • Coronary artery disease involving native heart without angina pectoris 1/21/2021     Nonobstructive coronary artery disease involving diagonal branch per cardiac catheterization done on 01/21/2021   • Diabetes mellitus (CMS/HCC)     type 2, Insulin pump   • Elevated cholesterol    • Hypertension    • ICD (implantable cardioverter-defibrillator), dual, in situ 9/22/2021   • Non-ischemic cardiomyopathy (CMS/HCC) 5/3/2021    Mildly dilated left ventricular cavity with severe global hypokinesis with estimated LV ejection fraction of       28%. On echo May 3, 2021   • Peyronie's disease         Family History: family history includes Diabetes in an other family member.     Social History: reports that he has been smoking cigarettes. He has been smoking about 1.00 pack per day. He has never used smokeless tobacco. He reports current alcohol use. He reports previous drug use.    Allergies: Patient has no known allergies.      Past  Surgical History:   Procedure Laterality Date   • CARDIAC CATHETERIZATION     • CARDIAC ELECTROPHYSIOLOGY PROCEDURE N/A 6/15/2021    Procedure: ICD SC new;  Surgeon: Regulo Coyne MD;  Location: [facility] INVASIVE LOC[facility];  Service: Cardiovascular;  Laterality: N/A;   • FRACTURE SURGERY     • OTHER SURGICAL HISTORY  06/10/2013    crporal plication   • VASECTOMY          Prior to Admission medications    Medication Sig Start Date End Date Taking? Authorizing Provider   BABY ASPIRIN PO Take 81 mg by mouth Daily.   Yes Mary Gonzalez MD   carvedilol (COREG) 25 MG tablet Take 25 mg by mouth 2 (Two) Times a Day With Meals.   Yes Mary Gonzalez MD   Entresto 49-51 MG tablet TAKE 1 TABLET BY MOUTH TWICE DAILY. REPLACING LOSARTAN 7/11/21  Yes Nataliia Bhakta [place] APRJENNIFER   furosemide (LASIX) 20 MG tablet Take 20 mg by mouth Daily.   Yes Mary Gonzalez MD   insulin aspart (novoLOG) 100 UNIT/ML injection Inject  under the skin into the appropriate area as directed.   Yes Mary Gonzalez MD   cephalexin (Keflex) 750 MG capsule Take 1 capsule by mouth 3 (Three) Times a Day. 6/16/21   Regulo Coyne MD   furosemide (LASIX) 20 MG tablet Take 1 tablet by mouth Daily. 8/6/21   Tommy Nagel MD   insulin aspart (NovoLOG) 100 UNIT/ML injection 1.5 Units/hr.    Mary Gonzalez MD        Review of Systems   Respiratory: Positive for shortness of breath (In am).    Cardiovascular: Negative for chest pain, palpitations and leg swelling.   Neurological: Positive for weakness.   All other systems reviewed and are negative.       Objective     Physical Exam  Constitutional:       General: He is not in acute distress.     Appearance: Normal appearance.   Neck:      Vascular: No carotid bruit.   Cardiovascular:      Rate and Rhythm: Normal rate and regular rhythm.      Heart sounds: S1 normal and S2 normal. Heart sounds are distant. No murmur heard.   No gallop. No S3 or S4 sounds.    Musculoskeletal:       "Right lower leg: No edema.      Left lower leg: No edema.   Neurological:      Mental Status: He is alert.       /80   Pulse 81   Ht 170.2 cm (67\")   Wt 72.6 kg (160 lb)   BMI 25.06 kg/m²       Vitals:    09/23/21 1006   BP: 146/80   Pulse: 81       Result Review :         The following data was reviewed by: ZEINAB Phoenix on 09/23/2021:      Lab Results   Component Value Date    BNP 1712 (H) 12/31/2020    BNP 4131 (H) 12/29/2020     CMP    CMP 5/25/21 5/26/21 5/26/21 6/15/21     0335 0335    Glucose    304 (A)   Glucose  62 (A)     BUN  10  11   Creatinine  0.82  0.83   eGFR Non  Am 103  103 98   eGFR African Am 119  120    Sodium  136  138   Potassium  4.0  4.5   Chloride  97 (A)  99   Calcium  9.0  9.2   (A) Abnormal value       Comments are available for some flowsheets but are not being displayed.           CBC w/diff    CBC w/Diff 12/31/20 1/21/21 6/15/21   WBC 8.16 5.82 8.10   RBC 5.27 4.93 5.97 (A)   Hemoglobin 17.7 16.5 19.3 (A)   Hematocrit 50.3 47.5 56.0 (A)   MCV 95.4 96.3 (A) 93.8   MCH 33.6 (A) 33.5 (A) 32.3   MCHC 35.2 34.7 34.5   RDW 12.7 12.4 13.5   Platelets 219 255 209   Neutrophil Rel % 52.2 43.2 52.2   Immature Granulocyte Rel %   0.2   Lymphocyte Rel % 32.4 41.1 32.7   Monocyte Rel % 12.1 (A) 11.0 (A) 10.9   Eosinophil Rel % 2.6 3.6 3.3   Basophil Rel % 0.6 0.9 0.7   (A) Abnormal value             Lipid Panel    Lipid Panel 12/29/20   Total Cholesterol 169   Triglycerides 98   HDL Cholesterol 81 (A)   VLDL Cholesterol 20   LDL Cholesterol  68 (A)   (A) Abnormal value       Comments are available for some flowsheets but are not being displayed.            No results found for: TSH   No results found for: FREET4   No results found for: DDIMERQUANT  Magnesium   Date Value Ref Range Status   05/25/2021 2.0 1.8 - 2.5 mg/dL Final      No results found for: DIGOXIN                          Assessment and Plan        Diagnoses and all orders for this visit:    1. Coronary " artery disease involving native heart without angina pectoris, unspecified vessel or lesion type (Primary)  Assessment & Plan:  Without angina.  Continue aspirin 81 mg once a day    Orders:  -     atorvastatin (LIPITOR) 10 MG tablet; Take 1 tablet by mouth Daily.  Dispense: 90 tablet; Refill: 3  -     Lipid Panel; Future  -     Comprehensive Metabolic Panel; Future    2. ICD (implantable cardioverter-defibrillator), dual, in situ  Assessment & Plan:  Monitor remotely no abnormal rate episodes.  Normally functioning ICD.      3. Non-ischemic cardiomyopathy (CMS/HCC)  Assessment & Plan:  Improving with restarting of furosemide.  We are going to try to maximize his medications by increasing Entresto 49/51 1 tab in the morning and 2 tabs at night for 1 week and then 2 tabs twice daily until the bottle is done.  Then he will start Entresto 97/103mg 1 tab twice daily.  We will check a BMP in 1 month.  He is instructed to call if he has any issues with dizziness or side effects.  Continue Lasix 20 mg a day and carvedilol 25 twice daily.    Repeat echo in 6 months.    Orders:  -     sacubitril-valsartan (ENTRESTO)  MG tablet; Take 1 tablet by mouth 2 (Two) Times a Day.  Dispense: 180 tablet; Refill: 3  -     Basic Metabolic Panel; Future  -     Adult Transthoracic Echo Complete W/ Cont if Necessary Per Protocol; Future    4. Hypertension, essential  Assessment & Plan:  Needs tighter control.  Increase Entresto as directed.  Continue Lasix 20 mg a day and carvedilol 25 twice daily.    Orders:  -     Comprehensive Metabolic Panel; Future  -     Magnesium; Future    5. Hyperlipidemia, unspecified hyperlipidemia type  Assessment & Plan:  In view of coronary disease he needs a statin for secondary prevention.  We will start him on atorvastatin 10 mg once a day.  Check lipid CMP in 3 months.      6. Cigarette nicotine dependence with nicotine-induced disorder  Assessment & Plan:  I have educated the patient on the risk of  diseases from using tobacco products such as heart disease. Patient verbalizes understanding of the need for smoking cessation but is unwilling to attempt at this time.  He does have nicotine patches at home when he is ready to try to stop.                Follow Up     Return in about 6 months (around 3/23/2022) for with Dr. Nagel, With external labs with echo.    Patient was given instructions and counseling regarding his condition or for health maintenance advice. Please see specific information pulled into the AVS if appropriate.       ZEINAB Hendricks  09/23/21 10:09 EDT    [phone]

## 2021-09-23 NOTE — ASSESSMENT & PLAN NOTE
I have educated the patient on the risk of diseases from using tobacco products such as heart disease. Patient verbalizes understanding of the need for smoking cessation but is unwilling to attempt at this time.  He does have nicotine patches at home when he is ready to try to stop.

## 2021-09-23 NOTE — ASSESSMENT & PLAN NOTE
In view of coronary disease he needs a statin for secondary prevention.  We will start him on atorvastatin 10 mg once a day.  Check lipid CMP in 3 months.

## 2021-09-23 NOTE — PATIENT INSTRUCTIONS
Start atorvastatin 10 mg 1 tab at night.    Increase Entresto 49 mg / 51 mg to 1 tab in the morning and 2 tabs at night for 1 week and then 2 tabs twice a day until the bottle is done.  Then he will have a new prescription of the pharmacy for Entresto 49/103 and will take it 1 twice a day.    Have any issues with dizziness do a blood pressure log and send it to me.

## 2021-10-16 ENCOUNTER — LAB (OUTPATIENT)
Dept: LAB | Facility: HOSPITAL | Age: 51
End: 2021-10-16

## 2021-10-16 DIAGNOSIS — I42.8 NON-ISCHEMIC CARDIOMYOPATHY (HCC): ICD-10-CM

## 2021-10-16 LAB
ANION GAP SERPL CALCULATED.3IONS-SCNC: 11.9 MMOL/L (ref 5–15)
BUN SERPL-MCNC: 5 MG/DL (ref 6–20)
BUN/CREAT SERPL: 6 (ref 7–25)
CALCIUM SPEC-SCNC: 9 MG/DL (ref 8.6–10.5)
CHLORIDE SERPL-SCNC: 98 MMOL/L (ref 98–107)
CO2 SERPL-SCNC: 26.1 MMOL/L (ref 22–29)
CREAT SERPL-MCNC: 0.84 MG/DL (ref 0.76–1.27)
GFR SERPL CREATININE-BSD FRML MDRD: 97 ML/MIN/1.73
GLUCOSE SERPL-MCNC: 313 MG/DL (ref 65–99)
POTASSIUM SERPL-SCNC: 4.7 MMOL/L (ref 3.5–5.2)
SODIUM SERPL-SCNC: 136 MMOL/L (ref 136–145)

## 2021-10-16 PROCEDURE — 80048 BASIC METABOLIC PNL TOTAL CA: CPT

## 2021-10-18 ENCOUNTER — TELEPHONE (OUTPATIENT)
Dept: CARDIOLOGY | Facility: CLINIC | Age: 51
End: 2021-10-18

## 2021-10-18 NOTE — TELEPHONE ENCOUNTER
----- Message from ZEINAB Phoenix sent at 10/17/2021  7:19 PM EDT -----  Kidney function is stable. No adverse effects from increasing the Entresto. Sugar is very high though if this was a nonfasting lab. Continue current dose Entresto

## 2021-11-29 RX ORDER — CARVEDILOL 25 MG/1
TABLET ORAL
Qty: 180 TABLET | Refills: 2 | Status: SHIPPED | OUTPATIENT
Start: 2021-11-29 | End: 2022-05-31 | Stop reason: SDUPTHER

## 2022-01-18 ENCOUNTER — TELEPHONE (OUTPATIENT)
Dept: CARDIOLOGY | Facility: CLINIC | Age: 52
End: 2022-01-18

## 2022-01-25 ENCOUNTER — OFFICE VISIT (OUTPATIENT)
Dept: CARDIOLOGY | Facility: CLINIC | Age: 52
End: 2022-01-25

## 2022-01-25 VITALS
HEART RATE: 75 BPM | WEIGHT: 164 LBS | HEIGHT: 67 IN | DIASTOLIC BLOOD PRESSURE: 82 MMHG | BODY MASS INDEX: 25.74 KG/M2 | SYSTOLIC BLOOD PRESSURE: 168 MMHG

## 2022-01-25 DIAGNOSIS — I10 HYPERTENSION, ESSENTIAL: Chronic | ICD-10-CM

## 2022-01-25 DIAGNOSIS — Z95.810 ICD (IMPLANTABLE CARDIOVERTER-DEFIBRILLATOR), DUAL, IN SITU: Chronic | ICD-10-CM

## 2022-01-25 DIAGNOSIS — I25.10 CORONARY ARTERY DISEASE INVOLVING NATIVE CORONARY ARTERY OF NATIVE HEART WITHOUT ANGINA PECTORIS: Chronic | ICD-10-CM

## 2022-01-25 DIAGNOSIS — I42.8 NON-ISCHEMIC CARDIOMYOPATHY: Primary | Chronic | ICD-10-CM

## 2022-01-25 PROCEDURE — 99214 OFFICE O/P EST MOD 30 MIN: CPT | Performed by: INTERNAL MEDICINE

## 2022-01-25 RX ORDER — SPIRONOLACTONE 25 MG/1
25 TABLET ORAL DAILY
Qty: 90 TABLET | Refills: 3 | Status: ON HOLD | OUTPATIENT
Start: 2022-01-25

## 2022-02-03 NOTE — ASSESSMENT & PLAN NOTE
Blood pressure on the higher side in the office today.  Adding spironolactone as mentioned above medications as it is.

## 2022-02-03 NOTE — ASSESSMENT & PLAN NOTE
He is clinically not volume overloaded.  Recently had weight gain and pedal edema.  There is evidence of high volume status remote monitoring of ICD.  Blood pressure on the higher side as well.  Will add spironolactone 25 mg p.o. daily.  Continue Entresto and carvedilol, with are already at the maximum dose.  Continue low-dose Lasix as well.  We will repeat basic metabolic panel 2 weeks after starting spironolactone.

## 2022-02-03 NOTE — PROGRESS NOTES
CARDIOLOGY FOLLOW-UP PROGRESS NOTE        Chief Complaint  Cardiomyopathy (Follow-up) and Hypertension    Subjective            Demetrio Mariano presents to [facility] CARDIOLOGY  History of Present Illness    Mr Mariano is here for follow-up visit.  Recently, he is reporting some weight gain some shortness of breath.  Denies any chest pain or palpitations. He underwent ICD placement on 6/15/2021 ejection fraction remained on the lower side in spite of being appropriate therapy.  He is taking all the medications as prescribed.      Past History:    1) Dilated cardiomyopathy with LV ejection fraction of 20-25%. 2) Nonobstructive coronary artery disease involving diagonal branch per cardiac catheterization done on 01/21/2021. 3) Diabetes mellitus, on insulin pump. 4) History of medication noncompliance.    Medical History:  Past Medical History:   Diagnosis Date   • CHF (congestive heart failure) (HCC)    • Cigarette nicotine dependence with nicotine-induced disorder 9/23/2021   • Coronary artery disease involving native heart without angina pectoris 1/21/2021     Nonobstructive coronary artery disease involving diagonal branch per cardiac catheterization done on 01/21/2021   • Diabetes mellitus (HCC)     type 2, Insulin pump   • Elevated cholesterol    • Hypertension    • ICD (implantable cardioverter-defibrillator), dual, in situ 9/22/2021   • Non-ischemic cardiomyopathy (CMS/HCC) 5/3/2021    Mildly dilated left ventricular cavity with severe global hypokinesis with estimated LV ejection fraction of       28%. On echo May 3, 2021   • Peyronie's disease        Surgical History: has a past surgical history that includes Other surgical history (06/10/2013); Vasectomy; Cardiac catheterization; Fracture surgery; and Cardiac electrophysiology procedure (N/A, 6/15/2021).     Family History: family history includes Diabetes in an other family member.     Social History: reports that he has been  "smoking cigarettes. He has been smoking about 1.50 packs per day. He has never used smokeless tobacco. He reports current alcohol use of about 1.0 standard drink of alcohol per week. He reports previous drug use.    Allergies: Patient has no known allergies.    Current Outpatient Medications on File Prior to Visit   Medication Sig   • atorvastatin (LIPITOR) 10 MG tablet Take 1 tablet by mouth Daily.   • BABY ASPIRIN PO Take 81 mg by mouth Daily.   • carvedilol (COREG) 25 MG tablet TAKE 1 TABLET BY MOUTH TWICE DAILY   • furosemide (LASIX) 20 MG tablet Take 20 mg by mouth Daily.   • insulin aspart (novoLOG) 100 UNIT/ML injection Inject  under the skin into the appropriate area as directed. On pump   • sacubitril-valsartan (ENTRESTO)  MG tablet Take 1 tablet by mouth 2 (Two) Times a Day.     No current facility-administered medications on file prior to visit.          Review of Systems   Constitutional: Positive for fatigue.   Respiratory: Positive for shortness of breath. Negative for cough and wheezing.    Cardiovascular: Positive for leg swelling. Negative for chest pain and palpitations.   Gastrointestinal: Negative for nausea and vomiting.   Neurological: Negative for dizziness and syncope.        Objective     /82 (BP Location: Left arm)   Pulse 75   Ht 170.2 cm (67\")   Wt 74.4 kg (164 lb)   BMI 25.69 kg/m²       Physical Exam    General : Alert, awake, no acute distress  Neck : Supple, no carotid bruit, no jugular venous distention  CVS : Regular rate and rhythm, no murmur, rubs or gallops  Lungs: Clear to auscultation bilaterally, no crackles or rhonchi  Abdomen: Soft, nontender, bowel sounds heard in all 4 quadrants  Extremities: Warm, well-perfused, no pedal edema    Result Review :     The following data was reviewed by: Tommy Nagel MD on 01/25/2022:    CMP    CMP 5/26/21 5/26/21 6/15/21 10/16/21    0335 0335     Glucose   304 (A) 313 (A)   Glucose 62 (A)      BUN 10  11 5 (A) "   Creatinine 0.82  0.83 0.84   eGFR Non  Am  103 98 97   eGFR African Am  120     Sodium 136  138 136   Potassium 4.0  4.5 4.7   Chloride 97 (A)  99 98   Calcium 9.0  9.2 9.0   (A) Abnormal value       Comments are available for some flowsheets but are not being displayed.           CBC    CBC 6/15/21   WBC 8.10   RBC 5.97 (A)   Hemoglobin 19.3 (A)   Hematocrit 56.0 (A)   MCV 93.8   MCH 32.3   MCHC 34.5   RDW 13.5   Platelets 209   (A) Abnormal value                     Data reviewed: Cardiology studies        Echocardiogram done on 5/3/2021 showed    1.  Mildly dilated left ventricular cavity with severe global hypokinesis with estimated LV ejection fraction of 28%.  2.  Grade 1 diastolic dysfunction of the left ventricle.   3.  Mild left atrial enlargement.  4.  No hemodynamically significant valvular abnormalities.                 Assessment and Plan        Diagnoses and all orders for this visit:    1. Non-ischemic cardiomyopathy (HCC) (Primary)  Assessment & Plan:  He is clinically not volume overloaded.  Recently had weight gain and pedal edema.  There is evidence of high volume status remote monitoring of ICD.  Blood pressure on the higher side as well.  Will add spironolactone 25 mg p.o. daily.  Continue Entresto and carvedilol, with are already at the maximum dose.  Continue low-dose Lasix as well.  We will repeat basic metabolic panel 2 weeks after starting spironolactone.    Orders:  -     spironolactone (ALDACTONE) 25 MG tablet; Take 1 tablet by mouth Daily.  Dispense: 90 tablet; Refill: 3  -     Basic Metabolic Panel; Future    2. Hypertension, essential  Assessment & Plan:  Blood pressure on the higher side in the office today.  Adding spironolactone as mentioned above medications as it is.      3. ICD (implantable cardioverter-defibrillator), dual, in situ  Assessment & Plan:  We will continue with home remote monitoring and need interrogation in office during next office visit.      4.  Coronary artery disease involving native coronary artery of native heart without angina pectoris  Assessment & Plan:  Denies angina.  Continue aspirin, statins and beta-blockers.              Follow Up     Return in about 4 months (around 5/25/2022) for Next scheduled follow up, okay to double book.    Patient was given instructions and counseling regarding his condition or for health maintenance advice. Please see specific information pulled into the AVS if appropriate.

## 2022-02-03 NOTE — ASSESSMENT & PLAN NOTE
We will continue with home remote monitoring and need interrogation in office during next office visit.

## 2022-02-07 ENCOUNTER — PATIENT MESSAGE (OUTPATIENT)
Dept: CARDIOLOGY | Facility: CLINIC | Age: 52
End: 2022-02-07

## 2022-02-08 DIAGNOSIS — I42.8 NON-ISCHEMIC CARDIOMYOPATHY: Chronic | ICD-10-CM

## 2022-02-09 NOTE — PROGRESS NOTES
Labs done on 2/8/2022 showed normal kidney functions and potassium.    Continue all the current medications without changes and follow-up as scheduled.      Electronically signed by Tommy Nagel MD, 02/09/22, 2:10 PM EST.

## 2022-03-17 PROBLEM — E10.59: Status: ACTIVE | Noted: 2022-03-17

## 2022-03-17 PROBLEM — E78.00 HYPERCHOLESTEROLEMIA: Status: ACTIVE | Noted: 2022-03-17

## 2022-03-17 PROBLEM — D75.1 POLYCYTHEMIA: Status: ACTIVE | Noted: 2021-05-26

## 2022-03-17 PROBLEM — I50.20 SYSTOLIC HEART FAILURE: Status: ACTIVE | Noted: 2021-05-26

## 2022-03-17 PROBLEM — N48.6 PEYRONIE'S DISEASE: Status: RESOLVED | Noted: 2022-03-17 | Resolved: 2022-03-17

## 2022-03-17 PROBLEM — N48.6 PEYRONIE'S DISEASE: Status: ACTIVE | Noted: 2022-03-17

## 2022-03-17 PROBLEM — E78.5 HYPERLIPIDEMIA: Status: RESOLVED | Noted: 2021-09-23 | Resolved: 2022-03-17

## 2022-03-17 PROBLEM — F17.219 CIGARETTE NICOTINE DEPENDENCE WITH NICOTINE-INDUCED DISORDER: Chronic | Status: RESOLVED | Noted: 2021-09-23 | Resolved: 2022-03-17

## 2022-03-17 PROBLEM — IMO0002 TYPE I DIABETES MELLITUS, UNCONTROLLED: Status: ACTIVE | Noted: 2022-03-17

## 2022-03-17 PROBLEM — F17.200 NICOTINE DEPENDENCE: Status: ACTIVE | Noted: 2022-03-17

## 2022-03-17 PROBLEM — I10 ESSENTIAL HYPERTENSION, BENIGN: Status: ACTIVE | Noted: 2022-03-17

## 2022-03-17 PROBLEM — I10 HYPERTENSION, ESSENTIAL: Chronic | Status: RESOLVED | Noted: 2021-09-22 | Resolved: 2022-03-17

## 2022-05-28 PROBLEM — E78.00 HYPERCHOLESTEROLEMIA: Status: RESOLVED | Noted: 2022-03-17 | Resolved: 2022-05-28

## 2022-05-28 PROBLEM — I50.20 SYSTOLIC HEART FAILURE: Status: RESOLVED | Noted: 2021-05-26 | Resolved: 2022-05-28

## 2022-05-28 PROBLEM — E78.2 MIXED HYPERLIPIDEMIA: Status: ACTIVE | Noted: 2021-09-23

## 2022-05-28 NOTE — PROGRESS NOTES
CARDIOLOGY FOLLOW-UP PROGRESS NOTE        Chief Complaint  Device check and Cardiomyopathy (Follow-up)    Subjective            Demetrio Mariano presents to [facility] CARDIOLOGY  History of Present Illness      Mr Mariano is here for a routine 6-month follow-up visit.  During last office visit in January 2022, spironolactone was started regimen for goal-directed medical therapy and also elevated blood pressure.  Repeat echo was performed in March which showed LV ejection fraction of 42% which is some improvement from before.    He had a visit to emergency room at [facility] on 2/22/2022 because of nausea and vomiting.  Labs reveal abnormal liver enzymes.  Blood sugars were significantly elevated.  He was advised admission but declined.    Today he denies any new complaints.  He had episode of shakiness and sweating yesterday.  Blood sugar was in 60s.  He denies any chest pain.  He feels fatigue and shortness of breath but improved from before.  No dizziness or syncopal episodes.  Denies palpitations.  He is taking all the medicines as prescribed.      Past History:    1) Dilated cardiomyopathy with LV ejection fraction of 20-25%. 2) Nonobstructive coronary artery disease involving diagonal branch per cardiac catheterization done on 01/21/2021. 3) Diabetes mellitus, on insulin pump. 4) History of medication noncompliance.    Medical History:  Past Medical History:   Diagnosis Date   • CHF (congestive heart failure) (HCC)    • Coronary artery disease involving native heart without angina pectoris      Nonobstructive coronary artery disease involving diagonal branch per cardiac catheterization done on 01/21/2021   • Essential hypertension, benign    • Hypercholesterolemia    • ICD (implantable cardioverter-defibrillator), dual, in situ 09/22/2021   • Nicotine dependence    • Non-ischemic cardiomyopathy (CMS/HCC) 05/03/2021    Mildly dilated left ventricular cavity with severe global  "hypokinesis with estimated LV ejection fraction of       28%. On echo May 3, 2021   • Peyronie's disease    • Type 1 diabetes mellitus with vascular disease (HCC)    • Type I diabetes mellitus, uncontrolled        Surgical History: has a past surgical history that includes Other surgical history (06/10/2013); Vasectomy; Cardiac catheterization; Fracture surgery; and Cardiac electrophysiology procedure (N/A, 6/15/2021).     Family History: family history includes Diabetes in an other family member.     Social History: reports that he has been smoking cigarettes. He has been smoking about 1.50 packs per day. He has never used smokeless tobacco. He reports current alcohol use of about 1.0 standard drink of alcohol per week. He reports previous drug use.    Allergies: Patient has no known allergies.    Current Outpatient Medications on File Prior to Visit   Medication Sig   • BABY ASPIRIN PO Take 81 mg by mouth Daily.   • insulin aspart (novoLOG) 100 UNIT/ML injection Inject  under the skin into the appropriate area as directed. On pump   • spironolactone (ALDACTONE) 25 MG tablet Take 1 tablet by mouth Daily.   • [DISCONTINUED] atorvastatin (LIPITOR) 10 MG tablet Take 1 tablet by mouth Daily.   • [DISCONTINUED] carvedilol (COREG) 25 MG tablet TAKE 1 TABLET BY MOUTH TWICE DAILY   • [DISCONTINUED] furosemide (LASIX) 20 MG tablet Take 20 mg by mouth Daily.   • [DISCONTINUED] sacubitril-valsartan (ENTRESTO)  MG tablet Take 1 tablet by mouth 2 (Two) Times a Day.     No current facility-administered medications on file prior to visit.          Review of Systems   Constitutional: Positive for fatigue.   Respiratory: Negative for cough, shortness of breath and wheezing.    Cardiovascular: Negative for chest pain, palpitations and leg swelling.   Gastrointestinal: Negative for nausea and vomiting.   Neurological: Negative for dizziness and syncope.        Objective     /54   Pulse 83   Ht 170.2 cm (67\")   Wt 69.4 " kg (153 lb)   BMI 23.96 kg/m²       Physical Exam    General : Alert, awake, no acute distress  Neck : Supple, no carotid bruit, no jugular venous distention  CVS : Regular rate and rhythm, no murmur, rubs or gallops  Lungs: Clear to auscultation bilaterally, no crackles or rhonchi  Abdomen: Soft, nontender, bowel sounds heard in all 4 quadrants  Extremities: Warm, well-perfused, no pedal edema    Result Review :     The following data was reviewed by: Tommy Nagel MD on 05/31/2022:      Labs done on 2/22/2022 showed    Component   Ref Range & Units 3 mo ago   GLUCOSE-TL   70 - 110 mg/dL 442 High     BUN-TL   7 - 18 mg/dL 26 High     CREATININE-TL   0.8 - 1.3 mg/dL 1.1    SODIUM-TL   136 - 145 mmol/L 130 Low     POTASSIUM-TL   3.5 - 5.1 mmol/L 4.3    Chloride   98 - 107 mmol/L 95 Low     CARBON DIOXIDE-TL   21 - 32 mmol/L 25    CALCIUM-TL   8.7 - 10.2 mg/dL 8.9    TOTAL PROTEIN-TL   6.4 - 8.2 g/dL 7.2    ALBUMIN-TL   3.4 - 5.0 g/dL 4.9    TOTAL BILIRUBIN-TL   0.2 - 1.3 mg/dL 1.25    GOT (AST)-TL   15 - 37 U/L 194 High     GPT (ALT)-TL   0 - 50 U/L 160 High     ALP-TL   50 - 136 U/L 182 High             WBC (WHITE BLOOD CELL)-TL   4.8 - 10.8 10*3/uL 15.2 High     RBC (RED BLOOD CELL-TL   4.70 - 6.10 10*6/uL 4.06 Low     HEMOGLOBIN-TL   14.0 - 18.0 g/dL 13.6 Low     HEMATOCRIT-TL   42 - 52 % 37.4 Low     Platelet Count-TL   130 - 400 10*3/uL 251                 Data reviewed: Cardiology studies        Results for orders placed in visit on 03/14/22    Adult Transthoracic Echo Complete W/ Cont if Necessary Per Protocol    Interpretation Summary  · Mild global hypokinesis left ventricle with a calculated LV ejection fraction 42%. There is some improvement in ejection fraction compared to previous echocardiograms.  · Left ventricular wall thickness is consistent with mild concentric hypertrophy.  · There is diastolic dysfunction of the left ventricle.  · Estimated right ventricular systolic pressure from tricuspid  regurgitation is normal (<35 mmHg).                   Assessment and Plan        Diagnoses and all orders for this visit:    1. Non-ischemic cardiomyopathy (HCC) (Primary)  Assessment & Plan:  Most recent echocardiogram done in March showed ejection fraction of 42%, which is an improvement from before.  He has NYHA class II symptoms.  Continue carvedilol, Entresto and spironolactone, all are at the maximum dose now.  We will continue 20 mg of Lasix daily.  We will repeat CMP now especially since recent labs done at the ER showed elevated liver enzymes.    Orders:  -     carvedilol (COREG) 25 MG tablet; Take 1 tablet by mouth 2 (Two) Times a Day.  Dispense: 180 tablet; Refill: 2  -     furosemide (LASIX) 20 MG tablet; Take 1 tablet by mouth Daily.  Dispense: 90 tablet; Refill: 2  -     sacubitril-valsartan (ENTRESTO)  MG tablet; Take 1 tablet by mouth 2 (Two) Times a Day.  Dispense: 180 tablet; Refill: 3  -     Comprehensive Metabolic Panel; Future    2. ICD (implantable cardioverter-defibrillator), dual, in situ  Assessment & Plan:  ICD interrogated in the office today, normally functioning device.  No events noted.  We will continue home remote monitoring      3. Coronary artery disease involving native coronary artery of native heart without angina pectoris  Assessment & Plan:  Denies any angina.  Continue aspirin and statin.    Orders:  -     atorvastatin (LIPITOR) 10 MG tablet; Take 1 tablet by mouth Daily.  Dispense: 90 tablet; Refill: 3    4. Mixed hyperlipidemia  Assessment & Plan:  Lipid control uncertain.  We will check lipid panel with the next lab draw and adjust dose of atorvastatin if needed.    Orders:  -     Lipid Panel; Future    5. Essential hypertension  Assessment & Plan:  Blood pressure mildly elevated, better controlled during previous visits and also at home recordings.  Continue current regimen.    Orders:  -     carvedilol (COREG) 25 MG tablet; Take 1 tablet by mouth 2 (Two) Times a  Day.  Dispense: 180 tablet; Refill: 2            Follow Up     Return in about 6 months (around 11/30/2022) for Next scheduled follow up.    Patient was given instructions and counseling regarding his condition or for health maintenance advice. Please see specific information pulled into the AVS if appropriate.

## 2022-05-31 ENCOUNTER — OFFICE VISIT (OUTPATIENT)
Dept: CARDIOLOGY | Facility: CLINIC | Age: 52
End: 2022-05-31

## 2022-05-31 ENCOUNTER — CLINICAL SUPPORT NO REQUIREMENTS (OUTPATIENT)
Dept: CARDIOLOGY | Facility: CLINIC | Age: 52
End: 2022-05-31

## 2022-05-31 VITALS
SYSTOLIC BLOOD PRESSURE: 155 MMHG | HEIGHT: 67 IN | WEIGHT: 153 LBS | DIASTOLIC BLOOD PRESSURE: 54 MMHG | BODY MASS INDEX: 24.01 KG/M2 | HEART RATE: 83 BPM

## 2022-05-31 DIAGNOSIS — I25.10 CORONARY ARTERY DISEASE INVOLVING NATIVE CORONARY ARTERY OF NATIVE HEART WITHOUT ANGINA PECTORIS: Chronic | ICD-10-CM

## 2022-05-31 DIAGNOSIS — I10 ESSENTIAL HYPERTENSION: ICD-10-CM

## 2022-05-31 DIAGNOSIS — Z95.810 ICD (IMPLANTABLE CARDIOVERTER-DEFIBRILLATOR), DUAL, IN SITU: Primary | ICD-10-CM

## 2022-05-31 DIAGNOSIS — I42.8 NON-ISCHEMIC CARDIOMYOPATHY: ICD-10-CM

## 2022-05-31 DIAGNOSIS — Z95.810 ICD (IMPLANTABLE CARDIOVERTER-DEFIBRILLATOR), DUAL, IN SITU: Chronic | ICD-10-CM

## 2022-05-31 DIAGNOSIS — E78.2 MIXED HYPERLIPIDEMIA: ICD-10-CM

## 2022-05-31 DIAGNOSIS — I42.8 NON-ISCHEMIC CARDIOMYOPATHY: Primary | Chronic | ICD-10-CM

## 2022-05-31 PROCEDURE — 93282 PRGRMG EVAL IMPLANTABLE DFB: CPT | Performed by: INTERNAL MEDICINE

## 2022-05-31 PROCEDURE — 99214 OFFICE O/P EST MOD 30 MIN: CPT | Performed by: INTERNAL MEDICINE

## 2022-05-31 RX ORDER — FUROSEMIDE 20 MG/1
20 TABLET ORAL DAILY
Qty: 90 TABLET | Refills: 2 | Status: SHIPPED | OUTPATIENT
Start: 2022-05-31 | End: 2023-03-03

## 2022-05-31 RX ORDER — CARVEDILOL 25 MG/1
25 TABLET ORAL 2 TIMES DAILY
Qty: 180 TABLET | Refills: 2 | Status: ON HOLD | OUTPATIENT
Start: 2022-05-31

## 2022-05-31 RX ORDER — ATORVASTATIN CALCIUM 10 MG/1
10 TABLET, FILM COATED ORAL DAILY
Qty: 90 TABLET | Refills: 3 | Status: SHIPPED | OUTPATIENT
Start: 2022-05-31 | End: 2023-03-03

## 2022-05-31 NOTE — ASSESSMENT & PLAN NOTE
Blood pressure mildly elevated, better controlled during previous visits and also at home recordings.  Continue current regimen.

## 2022-05-31 NOTE — PROGRESS NOTES
Normal VVI ICD Device Interrogation and Device Testing.  Normal evaluation of device function and lead measurements.  No optimization was needed of parameters or maximization of device longevity.  Patient is on automated Home Remote Monitoring.  Remaining battery is 15 years, No episodes and no alerts.

## 2022-05-31 NOTE — ASSESSMENT & PLAN NOTE
ICD interrogated in the office today, normally functioning device.  No events noted.  We will continue home remote monitoring

## 2022-05-31 NOTE — ASSESSMENT & PLAN NOTE
Most recent echocardiogram done in March showed ejection fraction of 42%, which is an improvement from before.  He has NYHA class II symptoms.  Continue carvedilol, Entresto and spironolactone, all are at the maximum dose now.  We will continue 20 mg of Lasix daily.  We will repeat CMP now especially since recent labs done at the ER showed elevated liver enzymes.

## 2022-05-31 NOTE — ASSESSMENT & PLAN NOTE
Lipid control uncertain.  We will check lipid panel with the next lab draw and adjust dose of atorvastatin if needed.

## 2022-07-20 DIAGNOSIS — I42.8 NON-ISCHEMIC CARDIOMYOPATHY: Chronic | ICD-10-CM

## 2022-07-20 DIAGNOSIS — E78.2 MIXED HYPERLIPIDEMIA: ICD-10-CM

## 2022-07-27 ENCOUNTER — OFFICE VISIT (OUTPATIENT)
Dept: DIABETES SERVICES | Facility: HOSPITAL | Age: 52
End: 2022-07-27

## 2022-07-27 VITALS
SYSTOLIC BLOOD PRESSURE: 148 MMHG | OXYGEN SATURATION: 99 % | DIASTOLIC BLOOD PRESSURE: 89 MMHG | HEIGHT: 67 IN | TEMPERATURE: 98.4 F | BODY MASS INDEX: 25.64 KG/M2 | HEART RATE: 78 BPM | WEIGHT: 163.36 LBS

## 2022-07-27 DIAGNOSIS — N52.8 OTHER MALE ERECTILE DYSFUNCTION: ICD-10-CM

## 2022-07-27 DIAGNOSIS — E10.65 UNCONTROLLED TYPE 1 DIABETES MELLITUS WITH HYPERGLYCEMIA: Primary | ICD-10-CM

## 2022-07-27 DIAGNOSIS — E10.40 TYPE 1 DIABETES MELLITUS WITH DIABETIC NEUROPATHY: ICD-10-CM

## 2022-07-27 LAB
EXPIRATION DATE: ABNORMAL
GLUCOSE BLDC GLUCOMTR-MCNC: 133 MG/DL (ref 70–99)
HBA1C MFR BLD: 8.4 %
Lab: ABNORMAL

## 2022-07-27 PROCEDURE — 99204 OFFICE O/P NEW MOD 45 MIN: CPT | Performed by: NURSE PRACTITIONER

## 2022-07-27 PROCEDURE — 83036 HEMOGLOBIN GLYCOSYLATED A1C: CPT | Performed by: NURSE PRACTITIONER

## 2022-07-27 PROCEDURE — 82962 GLUCOSE BLOOD TEST: CPT | Performed by: NURSE PRACTITIONER

## 2022-07-27 PROCEDURE — G0463 HOSPITAL OUTPT CLINIC VISIT: HCPCS | Performed by: NURSE PRACTITIONER

## 2022-07-27 RX ORDER — GLUCAGON 3 MG/1
3 POWDER NASAL AS NEEDED
Qty: 1 EACH | Refills: 2 | Status: SHIPPED | OUTPATIENT
Start: 2022-07-27 | End: 2023-03-03 | Stop reason: SDUPTHER

## 2022-07-27 RX ORDER — INSULIN ASPART 100 [IU]/ML
INJECTION, SOLUTION INTRAVENOUS; SUBCUTANEOUS
Qty: 30 ML | Refills: 3 | Status: ON HOLD | OUTPATIENT
Start: 2022-07-27

## 2022-07-27 NOTE — PROGRESS NOTES
Chief Complaint  Diabetes (Pt has been on a mini med pump 670, no cgm at this time, he has no data on it to be uploaded)    Referred By: ZEINAB Onofre presents to [facility] for insulin pump management    History of Present Illness    Visit type:  an initial evaluation  Diabetes type:  Type 1  Age at time of dx/Year of dx/Number of years: He estimates he was diagnosed around 1999  Family History of Diabetes: Mother and father  Current diabetes status/concerns/issues: He indicates he was under the care of an endocrinologist who is helping manage his insulin pump but that provider left the community and he has been without someone to help manage his insulin pump for approximately 1 year and a half.  He has had sufficient pump supplies to hold himself over but is using borrowed insulin.  He is seeking to establish care for his diabetes in our office today for assistance with managing his pump.    Other current health concerns: No specific other health concerns other than some vision issues at times.  He does have a seizure disorder of unknown etiology  Diabetes symptoms:    Polyuria: No   Polydipsia: No   Polyphagia: No   Blurred vision: Yes   Excessive fatigue: No  Diabetes complications:  Neuro: Neuropathy with pain in the feet and legs; he denies numbness or tingling  Renal: None  Eyes: He was diagnosed with diabetic retinopathy several years ago  Amputation/Wounds: None  GI: None  Cardiovascular: Coronary artery disease, cardiomyopathy, congestive heart failure  ED: He describes problems with obtaining an erection as well as maintaining  Other: None  Hospitalizations/ED/911 secondary to DM?  Yes, EMS calls for hypoglycemia  Hypoglycemia:  Level 1 hypoglycemia (54 mg/dL - 70 mg/dL); Frequency - He reports weekly hypoglycemic events and Level 2 (less than 54 mg/dL); Frequency - Some glucose levels are as low as in the 40s  Hypoglycemia  Symptoms:  confusion, weakness, mood/behavior change and He has had seizures with low glucose levels in the past  Current Diabetes treatment: Medtronic pump currently using NovoLog insulin.  He has been on pump therapy since 2001.  He is currently only using his pump for delivery of basal insulin.  He does not enter carbohydrates or glucose levels into his device at this time.  Current pump settings are as follows:  Carbohydrate ratio of 1:12   Insulin sensitivity of 1:46  Blood glucose target of   Insulin duration of 4 hours  Basal rate of 1.5 units/h  Prior diabetes treatments: Multiple daily injections of insulin  Blood glucose device:  Meter  Blood glucose monitoring frequency:  1 - 2  Blood glucose range/average:    Dietary behavior:  Limits high carb/sweet foods, Avoids sugary drinks, Number of meals each day - 2-3; Number of snacks each day - Occasional, History of Diabetes Nutrition Counseling - NO  Activity/Exercise:  He is active with work but does not have a formal exercise plan  Last Eye Exam: Approximately 2 years ago; Location: [address] [place] [address]  Last Foot Exam: Several years ago  Diabetes Education Hx: None  Social Determinants of Health: None    Past Medical History:   Diagnosis Date   • CHF (congestive heart failure) (HCC)    • Coronary artery disease involving native heart without angina pectoris      Nonobstructive coronary artery disease involving diagonal branch per cardiac catheterization done on 01/21/2021   • Essential hypertension, benign    • Hypercholesterolemia    • ICD (implantable cardioverter-defibrillator), dual, in situ 09/22/2021   • Nicotine dependence    • Non-ischemic cardiomyopathy (CMS/HCC) 05/03/2021    Mildly dilated left ventricular cavity with severe global hypokinesis with estimated LV ejection fraction of       28%. On echo May 3, 2021   • Peyronie's disease    • Seizures (HCC)     started 2021   • Type 1 diabetes mellitus with vascular disease (HCC)    • Type I  diabetes mellitus, uncontrolled      Past Surgical History:   Procedure Laterality Date   • CARDIAC CATHETERIZATION     • CARDIAC ELECTROPHYSIOLOGY PROCEDURE N/A 06/15/2021    Procedure: ICD SC new;  Surgeon: Regulo Coyne MD;  [facility];  Service: Cardiovascular;  Laterality: N/A;   • FRACTURE SURGERY Right     crushed heel injury with 14 fractures   • OTHER SURGICAL HISTORY  06/10/2013    plication for treatment of pyronie's disease   • VASECTOMY       Family History   Problem Relation Age of Onset   • Diabetes Mother    • Diabetes Father      Social History     Socioeconomic History   • Marital status:    • Number of children: 2   Tobacco Use   • Smoking status: Current Every Day Smoker     Packs/day: 1.00     Types: Cigarettes   • Smokeless tobacco: Never Used   Vaping Use   • Vaping Use: Never used   Substance and Sexual Activity   • Alcohol use: Yes     Alcohol/week: 1.0 standard drink     Types: 1 Cans of beer per week     Comment: social drinker    • Drug use: Not Currently   • Sexual activity: Defer     No Known Allergies    Current Outpatient Medications:   •  Acetaminophen (TYLENOL EXTRA STRENGTH PO), Take 500 mg by mouth Daily., Disp: , Rfl:   •  atorvastatin (LIPITOR) 10 MG tablet, Take 1 tablet by mouth Daily., Disp: 90 tablet, Rfl: 3  •  BABY ASPIRIN PO, Take 81 mg by mouth Daily., Disp: , Rfl:   •  carvedilol (COREG) 25 MG tablet, Take 1 tablet by mouth 2 (Two) Times a Day., Disp: 180 tablet, Rfl: 2  •  furosemide (LASIX) 20 MG tablet, Take 1 tablet by mouth Daily., Disp: 90 tablet, Rfl: 2  •  sacubitril-valsartan (ENTRESTO)  MG tablet, Take 1 tablet by mouth 2 (Two) Times a Day., Disp: 180 tablet, Rfl: 3  •  spironolactone (ALDACTONE) 25 MG tablet, Take 1 tablet by mouth Daily., Disp: 90 tablet, Rfl: 3  •  Continuous Blood Gluc Sensor (FreeStyle Francis 2 Sensor) misc, 1 each Every 14 (Fourteen) Days., Disp: 2 each, Rfl: 11  •  Glucagon (Baqsimi Two Pack) 3  "MG/DOSE powder, 3 mg into the nostril(s) as directed by provider As Needed (Severe hypoglycemia)., Disp: 1 each, Rfl: 2  •  Insulin Aspart (NovoLOG) 100 UNIT/ML injection, Up to 100 per day per insulin pump, Disp: 30 mL, Rfl: 3    Review of Systems   Constitutional: Negative for activity change, appetite change, fatigue, fever, unexpected weight gain and unexpected weight loss.   HENT: Negative for congestion, ear pain, facial swelling, hearing loss, sore throat and tinnitus.    Eyes: Positive for blurred vision. Negative for double vision, redness and visual disturbance.   Respiratory: Positive for shortness of breath. Negative for cough and wheezing.    Cardiovascular: Negative for chest pain, palpitations and leg swelling.   Gastrointestinal: Negative for abdominal distention, constipation, diarrhea, nausea, vomiting, GERD and indigestion.   Endocrine: Negative for polydipsia, polyphagia and polyuria.   Genitourinary: Negative for difficulty urinating, frequency and urgency.   Musculoskeletal: Positive for myalgias. Negative for back pain and gait problem.   Skin: Negative for rash, skin lesions and wound.   Neurological: Positive for seizures. Negative for speech difficulty, weakness, headache and confusion.   Psychiatric/Behavioral: Negative for sleep disturbance, depressed mood and stress. The patient is not nervous/anxious.         Objective     Vitals:    07/27/22 0909   BP: 148/89   BP Location: Left arm   Patient Position: Sitting   Cuff Size: Adult   Pulse: 78   Temp: 98.4 °F (36.9 °C)   SpO2: 99%   Weight: 74.1 kg (163 lb 5.8 oz)   Height: 170.2 cm (67\")   PainSc: 0-No pain     Body mass index is 25.59 kg/m².    Physical Exam  Constitutional:       Appearance: Normal appearance.      Comments: Overweight with BMI if 25.59   HENT:      Head: Normocephalic and atraumatic.      Right Ear: External ear normal.      Left Ear: External ear normal.      Nose: Nose normal.   Eyes:      Extraocular Movements: " Extraocular movements intact.      Conjunctiva/sclera: Conjunctivae normal.   Pulmonary:      Effort: Pulmonary effort is normal.   Musculoskeletal:         General: Normal range of motion.      Cervical back: Normal range of motion.   Skin:     General: Skin is warm and dry.   Neurological:      General: No focal deficit present.      Mental Status: He is alert and oriented to person, place, and time. Mental status is at baseline.   Psychiatric:         Mood and Affect: Mood normal.         Behavior: Behavior normal.         Thought Content: Thought content normal.         Judgment: Judgment normal.         Result Review :   The following data was reviewed by: ZEINAB Goel on 07/27/2022:    Point-of-care A1c in the office today was 8.4% indicating uncontrolled type 1 diabetes.  We do not have an A1c for comparison.    Most Recent A1C    HGBA1C Most Recent 7/27/22   Hemoglobin A1C 8.4             A1C Last 3 Results    HGBA1C Last 3 Results 7/27/22   Hemoglobin A1C 8.4             Glucose   Date Value Ref Range Status   07/27/2022 133 (H) 70 - 99 mg/dL Final     Comment:     Serial Number: [id number]:  [id number]     Labs collected on 7/20/2022 show normal renal function.  A copy of these labs have been scanned into the record          Assessment: Patient's A1c indicates uncontrolled type 1 diabetes.  He has been wearing his insulin pump but has not been interacting with the pump to enter carbohydrates or glucose levels into the device.  He he has been without diabetes specialist to manage his insulin pump for over a year and a half.  He has never received formal diabetes education or nutrition counseling.  The patient has a history of significant hypoglycemia in the past requiring EMS interventions.      Diagnoses and all orders for this visit:    1. Uncontrolled type 1 diabetes mellitus with hyperglycemia (HCC) (Primary)  -     POC Glucose  -     POC Glycosylated Hemoglobin (Hb A1C)  -      Insulin Aspart (NovoLOG) 100 UNIT/ML injection; Up to 100 per day per insulin pump  Dispense: 30 mL; Refill: 3  -     Ambulatory Referral to Diabetes Care Clinic - He  -     Glucagon (Baqsimi Two Pack) 3 MG/DOSE powder; 3 mg into the nostril(s) as directed by provider As Needed (Severe hypoglycemia).  Dispense: 1 each; Refill: 2  -     Continuous Blood Gluc Sensor (FreeStyle Francis 2 Sensor) misc; 1 each Every 14 (Fourteen) Days.  Dispense: 2 each; Refill: 11    2. Type 1 diabetes mellitus with diabetic neuropathy (HCC)    3. Other male erectile dysfunction        Plan: We will refer the patient to the dietitian for nutrition counseling to focus on carbohydrate counting so he can begin entering the carbohydrates into his pump at each meal.  We will also request a francis continuous glucose sensor to facilitate glucose monitoring and to improve safety once the patient starts interacting with his pump more frequently.  We will also send a prescription for Baqsimi to the pharmacy for treatment of severe hypoglycemia.  He is to begin entering his glucose levels and carbohydrates into the pump using the current pump settings.    The patient will monitor his blood glucose levels 2-3 times a day or using the continuous glucose sensor if approved.  If he develops problematic hyperglycemia or hypoglycemia or adverse drug reactions, he will contact the office for further instructions.        Follow Up     Return in about 2 months (around 9/27/2022) for Pump Eval.    Patient was given instructions and counseling regarding his condition or for health maintenance advice. Please see specific information pulled into the AVS if appropriate.     Jennifer Delgado, ZEINAB  07/27/2022

## 2022-07-28 ENCOUNTER — PATIENT ROUNDING (BHMG ONLY) (OUTPATIENT)
Dept: DIABETES SERVICES | Facility: HOSPITAL | Age: 52
End: 2022-07-28

## 2022-07-28 NOTE — PROGRESS NOTES
July 28, 2022    Hello, may I speak with Demetrio Mariano?    My name is Janiya Pena.      I am  with [facility] [address] [facility] [address] [phone].    Before we get started may I verify your date of birth? 1970    I am calling to officially welcome you to our practice and ask about your recent visit. Is this a good time to talk? no    Left voicemail per script.

## 2022-07-28 NOTE — PROGRESS NOTES
Labs done on 7/20/2022 reviewed.  Liver enzymes are back to normal.  Kidney functions, sodium and potassium are within normal limits.    Cholesterol levels are at acceptable ranges.  LDL, bad cholesterol is 56 which is at goal.    Continue all the current medication without changes, follow-up as scheduled earlier.      Electronically signed by Tommy Nagel MD, 07/28/22, 3:42 PM EDT.

## 2022-08-01 ENCOUNTER — NUTRITION (OUTPATIENT)
Dept: NUTRITION | Facility: HOSPITAL | Age: 52
End: 2022-08-01

## 2022-08-01 PROCEDURE — 97802 MEDICAL NUTRITION INDIV IN: CPT | Performed by: DIETITIAN, REGISTERED

## 2022-08-03 VITALS — HEIGHT: 67 IN | WEIGHT: 163 LBS | BODY MASS INDEX: 25.58 KG/M2

## 2022-08-03 NOTE — CONSULTS
"  Demetrio Mariano is a 51 y.o. male who presents to [facility] for nutrition consult r/t diagnosis of T1DM, pt states he was diagnosed 25 years ago, has been using insulin pump for 21 years.  Demetrio Mariano is referred by ZEINAB Isabel.    Past Medical History:   Diagnosis Date   • CHF (congestive heart failure) (HCC)    • Coronary artery disease involving native heart without angina pectoris      Nonobstructive coronary artery disease involving diagonal branch per cardiac catheterization done on 01/21/2021   • Essential hypertension, benign    • Hypercholesterolemia    • ICD (implantable cardioverter-defibrillator), dual, in situ 09/22/2021   • Nicotine dependence    • Non-ischemic cardiomyopathy (CMS/HCC) 05/03/2021    Mildly dilated left ventricular cavity with severe global hypokinesis with estimated LV ejection fraction of       28%. On echo May 3, 2021   • Peyronie's disease    • Seizures (HCC)     started 2021   • Type 1 diabetes mellitus with vascular disease ([facility])    • Type I diabetes mellitus, uncontrolled        Anthropometrics    170.2 cm (67\")  73.9 kg (163 lb)  25.53 kg/m²    Diabetes History    Diabetes History  What type of diabetes do you have?: Type 1  Length of Diabetes Diagnosis: 10 + years  Current DM knowledge: good  Have you had diabetes education/teaching in the past?: yes  Do you test your blood sugar at home?: yes  Meter type: Francis  Typical readings: started wearing sensor yesterday- pt states in last 24 hours BG ranges     Education Preferences    Education Preferences  What areas of diabetes would you like to learn about?: diet information    Nutrition Information    Nutrition Information  Enter everything you can remember eating in the last 24 hours (1 day): breakfast- usually only on Sunday- eggs, sausage; lunch- various fast food, chicken or fish sandwich; dinner- chili/lasagna/spaghetti; snacks- if glucose is low, will snack on " crackers or sweets; beverages- diet Mt Dew, juice or lemonade if glucose is low  What is the biggest challenge you have with your diet?: Knowledge    Education Needs    DM Education Needs  Meter: Has own  Medication: Insulin  Healthy Eating: RD consult, Reviewed meal plan, Basic meal plan provided  Physical Activity: Walking  Physical Activity Frequency: Regularly  Motivation: Engaged  Teaching Method: Explanation, Discussion, Handouts  Patient Response: Verbalized understanding    DM Goals    DM Goals  Healthy Eating - Goal: Today  Being Active - Goal: Today  Taking Medication - Goal: Today  Problem Solving - Goal: Today  Monitoring - Goal: Today  Contact Plan: Follow-up medical care      Medications    Current Outpatient Medications   Medication   • Acetaminophen (TYLENOL EXTRA STRENGTH PO)   • atorvastatin   • BABY ASPIRIN PO   • carvedilol   • FreeStyle Francis 2 Sensor   • furosemide   • Baqsimi Two Pack   • Insulin Aspart   • sacubitril-valsartan   • spironolactone         Labs         Lab Results   Component Value Date    TRIG 98 12/29/2020    HDL 81 (H) 12/29/2020    LDL 68 (L) 12/29/2020 July 27, 2022- A1c 8.4%      Nutrition counseling provided on carbohydrate counting, portion control, measuring and reading labels.  Discussed eating out and gave suggestions on controlling carbohydrate intake and making healthier food choices.     Meal Plan:     Total Carbohydrates per meal: 3-4 carb servings/meal, at least 3 meals/day  Lean protein with meals.  Limit added fats.  Snacks: 1 carbohydrate serving (</= 22 g) + 1 protein serving.     Daily exercise encouraged (as recommended by healthcare provider). Discussed the benefits of exercise in lowering blood glucose, blood pressure, cholesterol, stress and controlling body weight.     Advised to continue daily blood glucose monitoring to assist with understanding of factors affecting blood glucose and assist with management of diabetes.  Discussed and provided with  target BG ranges.     Literature provided: Diabetes Nutrition Placemat, Choose Your Foods Booklet, Calorie Cristhian booklet    Pt will continue to follow up with ZEINAB Isabel for insulin pump mgmt.    Dietitian contact number provided.  Patient encouraged to call with questions or concerns.     Time spent with patient: 45 minutes    Mariely Mayers RDN, LD  08/01/2022

## 2022-09-28 ENCOUNTER — APPOINTMENT (OUTPATIENT)
Dept: DIABETES SERVICES | Facility: HOSPITAL | Age: 52
End: 2022-09-28

## 2022-11-24 NOTE — TELEPHONE ENCOUNTER
Agree with recommendations.     Keep daily weight log     Continue current medications.    Felisha Baeza sent to [facility] Nurse Msg Pool  Enbrel SureClick Pen 50MG reaut -  Approved - Pending [employer] assistance 2023     Valid from 01/01/2023 to 12/31/2023     Will notify when script can be sent to pharmacy     Pharmacy Coverage [employer] Part D   Patient's Co-Pay: $1533.01     Co pay Assistance Information   Patient was approved for copay assistance through [employer].   Re-Enrollment Required after 12/31/2022   Patient has started [employer] Patient assistance for 2023     Additional Information:   Reauthorization     Felisha Baeza   11/23/22      Depressed Depressed

## 2023-02-20 ENCOUNTER — TELEPHONE (OUTPATIENT)
Dept: DIABETES SERVICES | Facility: HOSPITAL | Age: 53
End: 2023-02-20
Payer: COMMERCIAL

## 2023-02-20 ENCOUNTER — PATIENT MESSAGE (OUTPATIENT)
Dept: DIABETES SERVICES | Facility: HOSPITAL | Age: 53
End: 2023-02-20
Payer: COMMERCIAL

## 2023-02-20 NOTE — TELEPHONE ENCOUNTER
Returned pts call. No answer, left detailed message asking him to give us a call back here at the office and we will be more than happy to help take care of his concerns.

## 2023-02-20 NOTE — TELEPHONE ENCOUNTER
JACKIE ELDRIDGE Key: [id number]  Insulin Aspart FlexPen 100UNIT/ML pen-injectorsNo coverage was found. Please reconfirm the patient's plan before submitting. You may also return to the dashboard and select a Medicare form if the patient has Medicare Part D coverage.      PT WAS NOTIFIED TO SEND UPDATED INS INFO SO I MAY SUBMIT PA

## 2023-02-27 ENCOUNTER — TELEPHONE (OUTPATIENT)
Dept: DIABETES SERVICES | Facility: HOSPITAL | Age: 53
End: 2023-02-27
Payer: COMMERCIAL

## 2023-02-27 NOTE — TELEPHONE ENCOUNTER
Called pt and let him know that the pa for his insulin was approved. Pt understood and said thank you

## 2023-02-27 NOTE — TELEPHONE ENCOUNTER
Pa approved for novolog vials     deirdre nobles Key: [id number] - PA Case ID: [id number]  Outcome  Approvedtoday  PA Case: [id number], Status: Approved, Coverage Starts on: 2/27/2023 12:00:00 AM, Coverage Ends on: 2/27/2024 12:00:00 AM.

## 2023-03-02 NOTE — PROGRESS NOTES
Chief Complaint  Diabetes (Follow up, med mgt, pump/cgm eval )    Referred By: ZEINAB Onofre presents to [facility] for diabetes medication management    History of Present Illness    Visit type:  follow-up  Diabetes type:  Type 1  Current diabetes status/concerns/issues: He states he increased his basal rates in his pump to 2 units/h.  He admits that he is still not entering glucose levels or carbohydrates into his pump at this time.  Other health concerns: In mid January he had a stroke that left him with some residual slurred speech especially in the morning and some feelings of stumbling with his feet at times.  Current Diabetes symptoms:    Polyuria: Yes   Polydipsia: Yes   Polyphagia: No   Blurred vision: Yes   Excessive fatigue: Yes   Known Diabetes complications:  Neuro: Neuropathy with pain in the feet and legs; he denies numbness or tingling  Renal: None  Eyes: He was diagnosed with diabetic retinopathy several years ago  Amputation/Wounds: None  GI: None  Cardiovascular: Coronary artery disease, cardiomyopathy, congestive heart failure  ED: He describes problems with obtaining an erection as well as maintaining   Other: None  Hypoglycemia:  Level 1 hypoglycemia (54 mg/dL - 70 mg/dL); Frequency - 1% per CGM  Hypoglycemia Symptoms:  confusion, weakness and mood/behavior change  Current diabetes treatment:   Medtronic pump currently using NovoLog insulin. He is currently only using his pump for delivery of basal insulin.    Blood glucose device:  Francis CGM  Blood glucose monitoring frequency:  Continuous  Blood glucose range/average: The 14-day sensor report shows a glucose average of 194 mg/dL with 53% of glucose levels found in target range between 70 and 180 mg/dL while 46% are above target and 1% are below target.  He is having random episodes of hypoglycemia during the overnight and early afternoon hours.  He has marked  postprandial hyperglycemia after 3 PM.  Glucose Source: Device Reviewed  Diet:  He is trying to follow a heart healthy diet  Activity/Exercise:  active with work    Past Medical History:   Diagnosis Date   • CHF (congestive heart failure) ([facility])    • Coronary artery disease involving native heart without angina pectoris      Nonobstructive coronary artery disease involving diagonal branch per cardiac catheterization done on 01/21/2021   • Essential hypertension, benign    • Hypercholesterolemia    • ICD (implantable cardioverter-defibrillator), dual, in situ 09/22/2021   • Nicotine dependence    • Non-ischemic cardiomyopathy (CMS/HCC) 05/03/2021    Mildly dilated left ventricular cavity with severe global hypokinesis with estimated LV ejection fraction of       28%. 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 ([facility])    • Type I diabetes mellitus, uncontrolled      Past Surgical History:   Procedure Laterality Date   • CARDIAC CATHETERIZATION     • CARDIAC ELECTROPHYSIOLOGY PROCEDURE N/A 06/15/2021    Procedure: ICD SC new;  Surgeon: Regulo Coyne MD;  [facility];  Service: Cardiovascular;  Laterality: N/A;   • FRACTURE SURGERY Right     crushed heel injury with 14 fractures   • OTHER SURGICAL HISTORY  06/10/2013    plication for treatment of pyronie's disease   • VASECTOMY       Family History   Problem Relation Age of Onset   • Diabetes Mother    • Diabetes Father      Social History     Socioeconomic History   • Marital status:    • Number of children: 2   Tobacco Use   • Smoking status: Every Day     Packs/day: 1.00     Types: Cigarettes   • Smokeless tobacco: Never   Vaping Use   • Vaping Use: Never used   Substance and Sexual Activity   • Alcohol use: Yes     Alcohol/week: 1.0 standard drink     Types: 1 Cans of beer per week     Comment: social drinker    • Drug use: Not Currently   • Sexual  "activity: Defer     No Known Allergies    Current Outpatient Medications:   •  Acetaminophen (TYLENOL EXTRA STRENGTH PO), Take 500 mg by mouth Daily., Disp: , Rfl:   •  BABY ASPIRIN PO, Take 81 mg by mouth Daily., Disp: , Rfl:   •  carvedilol (COREG) 25 MG tablet, Take 1 tablet by mouth 2 (Two) Times a Day., Disp: 180 tablet, Rfl: 2  •  Continuous Blood Gluc Sensor (FreeStyle Francis 2 Sensor) misc, 1 each Every 14 (Fourteen) Days., Disp: 2 each, Rfl: 11  •  Glucagon (Baqsimi Two Pack) 3 MG/DOSE powder, 3 mg into the nostril(s) as directed by provider As Needed (Severe hypoglycemia)., Disp: 1 each, Rfl: 2  •  Insulin Aspart (NovoLOG) 100 UNIT/ML injection, Up to 100 per day per insulin pump, Disp: 30 mL, Rfl: 3  •  sacubitril-valsartan (ENTRESTO)  MG tablet, Take 1 tablet by mouth 2 (Two) Times a Day., Disp: 180 tablet, Rfl: 3  •  spironolactone (ALDACTONE) 25 MG tablet, Take 1 tablet by mouth Daily., Disp: 90 tablet, Rfl: 3    Review of Systems   Constitutional: Positive for fatigue. Negative for activity change, appetite change, unexpected weight gain and unexpected weight loss.   Eyes: Positive for blurred vision. Negative for visual disturbance.   Gastrointestinal: Negative for abdominal pain, constipation, diarrhea, nausea, vomiting, GERD and indigestion.   Endocrine: Positive for polydipsia and polyuria. Negative for polyphagia.   Neurological: Negative for numbness.        Objective     Vitals:    03/03/23 1420   BP: 152/61   BP Location: Left arm   Patient Position: Sitting   Cuff Size: Adult   Pulse: 77   Temp: 98.4 °F (36.9 °C)   SpO2: 100%   Weight: 73.5 kg (162 lb)   Height: 170.2 cm (67\")   PainSc: 0-No pain     Body mass index is 25.37 kg/m².    Physical Exam  Constitutional:       Appearance: Normal appearance.      Comments: Overweight with BMI of 25.37   HENT:      Head: Normocephalic and atraumatic.      Right Ear: External ear normal.      Left Ear: External ear normal.      Nose: Nose normal. "   Eyes:      Extraocular Movements: Extraocular movements intact.      Conjunctiva/sclera: Conjunctivae normal.   Pulmonary:      Effort: Pulmonary effort is normal.   Musculoskeletal:         General: Normal range of motion.      Cervical back: Normal range of motion.   Skin:     General: Skin is warm and dry.   Neurological:      General: No focal deficit present.      Mental Status: He is alert and oriented to person, place, and time. Mental status is at baseline.   Psychiatric:         Mood and Affect: Mood normal.         Behavior: Behavior normal.         Thought Content: Thought content normal.         Judgment: Judgment normal.         Result Review :   The following data was reviewed by: ZEINAB Goel on 03/03/2023:    Most Recent A1C    HGBA1C Most Recent 1/11/23   Hemoglobin A1C 11.0 (A)   (A) Abnormal value       Comments are available for some flowsheets but are not being displayed.             A1C Last 3 Results    HGBA1C Last 3 Results 7/27/22 1/10/23 1/11/23   Hemoglobin A1C 8.4 10.8 (A) 11.0 (A)   (A) Abnormal value       Comments are available for some flowsheets but are not being displayed.           His most recent A1c was collected on 1/11/2023 and was 11% indicating uncontrolled type 1 diabetes.  This is up from the prior result of 8.4 collected in July 2022    Glucose   Date Value Ref Range Status   03/03/2023 193 (H) 70 - 99 mg/dL Final     Comment:     Serial Number: [id number]:  [id number]   01/12/2023 225 (H) 71 - 139 mg/dL Final     Comment:     H   01/09/2023 238 (H) 70 - 110 mg/dL Final     Point of care glucose is elevated at 193 mg/dl          Assessment: The patient has had an increase in his A1c since last being seen.  He has had an uneventful health course over the last few months with a stroke occurring in January.  He did increase his basal rates on his insulin pump but he continues to fail to enter his carbohydrates into the device with his meals.  This is  causing significant postprandial hyperglycemia in the afternoon and evening hours.  In review of the patient's medication list he is currently not taking Plavix or his cholesterol medications.  The patient was encouraged to follow-up with his primary care provider and his cardiologist about the ongoing need of these medications.  He is also not had a referral for neurology services after having his stroke.  He is to talk to his PCP regarding this concern.      Diagnoses and all orders for this visit:    1. Type 1 diabetes mellitus with diabetic neuropathy (HCC) (Primary)    2. Uncontrolled type 1 diabetes mellitus with hyperglycemia (HCC)  -     Glucagon (Baqsimi Two Pack) 3 MG/DOSE powder; 3 mg into the nostril(s) as directed by provider As Needed (Severe hypoglycemia).  Dispense: 1 each; Refill: 2    3. Overweight (BMI 25.0-29.9)    Other orders  -     POC Glucose        Plan: Adjustments were made to his pump settings to minimize risk for hypoglycemia.  Adjustments are as follows:    The 12 AM to 12 AM basal rate of 2.15 was set to end at 6 AM  6 AM to 3 PM basal rate of 1.9 was established  A 3 PM to 12 AM basal rate of 2.15 was established    The correction ratio of 1:46 was adjusted to 1-40  The glucose targets of  were changed to 110-120    The patient is strongly encouraged to enter his carbohydrates with his meals especially in the late afternoon and evening hours.  He will monitor his glucose levels carefully to determine if these changes resulted in any problematic hypoglycemia.  If so, he will contact our office.    The patient will monitor his blood glucose levels using his continuous glucose sensor.  If he develops problematic hyperglycemia or hypoglycemia or adverse drug reactions, he will contact the office for further instructions.        Follow Up     Return in about 3 months (around 6/3/2023) for Pump Eval, CGM Follow-up.    Patient was given instructions and counseling regarding his  condition or for health maintenance advice. Please see specific information pulled into the AVS if appropriate.     Jennifer Delgado, APRN  03/03/2023      Dictated Utilizing Dragon Dictation.  Please note that portions of this note were completed with a voice recognition program.  Part of this note may be an electronic transcription/translation of spoken language to printed text using the Dragon Dictation System.

## 2023-03-03 ENCOUNTER — OFFICE VISIT (OUTPATIENT)
Dept: DIABETES SERVICES | Facility: HOSPITAL | Age: 53
End: 2023-03-03
Payer: COMMERCIAL

## 2023-03-03 VITALS
DIASTOLIC BLOOD PRESSURE: 61 MMHG | HEIGHT: 67 IN | SYSTOLIC BLOOD PRESSURE: 152 MMHG | OXYGEN SATURATION: 100 % | WEIGHT: 162 LBS | HEART RATE: 77 BPM | BODY MASS INDEX: 25.43 KG/M2 | TEMPERATURE: 98.4 F

## 2023-03-03 DIAGNOSIS — E66.3 OVERWEIGHT (BMI 25.0-29.9): ICD-10-CM

## 2023-03-03 DIAGNOSIS — E10.65 UNCONTROLLED TYPE 1 DIABETES MELLITUS WITH HYPERGLYCEMIA: ICD-10-CM

## 2023-03-03 DIAGNOSIS — E10.40 TYPE 1 DIABETES MELLITUS WITH DIABETIC NEUROPATHY: Primary | ICD-10-CM

## 2023-03-03 LAB — GLUCOSE BLDC GLUCOMTR-MCNC: 193 MG/DL (ref 70–99)

## 2023-03-03 PROCEDURE — 82962 GLUCOSE BLOOD TEST: CPT | Performed by: NURSE PRACTITIONER

## 2023-03-03 PROCEDURE — 99214 OFFICE O/P EST MOD 30 MIN: CPT | Performed by: NURSE PRACTITIONER

## 2023-03-03 PROCEDURE — 95251 CONT GLUC MNTR ANALYSIS I&R: CPT | Performed by: NURSE PRACTITIONER

## 2023-03-03 PROCEDURE — G0463 HOSPITAL OUTPT CLINIC VISIT: HCPCS | Performed by: NURSE PRACTITIONER

## 2023-03-03 RX ORDER — CLOPIDOGREL BISULFATE 75 MG/1
75 TABLET ORAL DAILY
COMMUNITY
Start: 2023-01-13 | End: 2023-03-03

## 2023-03-03 RX ORDER — ATORVASTATIN CALCIUM 40 MG/1
40 TABLET, FILM COATED ORAL DAILY
COMMUNITY
Start: 2023-01-12 | End: 2023-03-03

## 2023-03-03 RX ORDER — GLUCAGON 3 MG/1
3 POWDER NASAL AS NEEDED
Qty: 1 EACH | Refills: 2 | Status: SHIPPED | OUTPATIENT
Start: 2023-03-03

## 2023-04-05 ENCOUNTER — APPOINTMENT (OUTPATIENT)
Dept: CT IMAGING | Facility: HOSPITAL | Age: 53
End: 2023-04-05
Payer: COMMERCIAL

## 2023-04-05 ENCOUNTER — APPOINTMENT (OUTPATIENT)
Dept: GENERAL RADIOLOGY | Facility: HOSPITAL | Age: 53
End: 2023-04-05
Payer: COMMERCIAL

## 2023-04-05 ENCOUNTER — HOSPITAL ENCOUNTER (OUTPATIENT)
Facility: HOSPITAL | Age: 53
Setting detail: OBSERVATION
Discharge: HOME | End: 2023-04-07
Attending: EMERGENCY MEDICINE | Admitting: INTERNAL MEDICINE
Payer: COMMERCIAL

## 2023-04-05 DIAGNOSIS — I63.9 ACUTE CVA (CEREBROVASCULAR ACCIDENT): Primary | ICD-10-CM

## 2023-04-05 DIAGNOSIS — R47.1 DYSARTHRIA: ICD-10-CM

## 2023-04-05 DIAGNOSIS — R26.2 DIFFICULTY IN WALKING: ICD-10-CM

## 2023-04-05 LAB
ABO GROUP BLD: NORMAL
ABO GROUP BLD: NORMAL
ALBUMIN SERPL-MCNC: 4.3 G/DL (ref 3.5–5.2)
ALBUMIN/GLOB SERPL: 1.7 G/DL
ALP SERPL-CCNC: 103 U/L (ref 39–117)
ALT SERPL W P-5'-P-CCNC: 19 U/L (ref 1–41)
ANION GAP SERPL CALCULATED.3IONS-SCNC: 9.6 MMOL/L (ref 5–15)
APTT PPP: 28.3 SECONDS (ref 24.2–34.2)
AST SERPL-CCNC: 14 U/L (ref 1–40)
BASOPHILS # BLD AUTO: 0.04 10*3/MM3 (ref 0–0.2)
BASOPHILS NFR BLD AUTO: 0.4 % (ref 0–1.5)
BILIRUB SERPL-MCNC: 0.6 MG/DL (ref 0–1.2)
BILIRUB UR QL STRIP: NEGATIVE
BLD GP AB SCN SERPL QL: NEGATIVE
BUN SERPL-MCNC: 15 MG/DL (ref 6–20)
BUN/CREAT SERPL: 17.2 (ref 7–25)
CALCIUM SPEC-SCNC: 9.3 MG/DL (ref 8.6–10.5)
CHLORIDE SERPL-SCNC: 104 MMOL/L (ref 98–107)
CLARITY UR: CLEAR
CO2 SERPL-SCNC: 26.4 MMOL/L (ref 22–29)
COLOR UR: YELLOW
CREAT SERPL-MCNC: 0.87 MG/DL (ref 0.76–1.27)
DEPRECATED RDW RBC AUTO: 42.5 FL (ref 37–54)
EGFRCR SERPLBLD CKD-EPI 2021: 104.5 ML/MIN/1.73
EOSINOPHIL # BLD AUTO: 0.28 10*3/MM3 (ref 0–0.4)
EOSINOPHIL NFR BLD AUTO: 2.7 % (ref 0.3–6.2)
ERYTHROCYTE [DISTWIDTH] IN BLOOD BY AUTOMATED COUNT: 13.1 % (ref 12.3–15.4)
GLOBULIN UR ELPH-MCNC: 2.5 GM/DL
GLUCOSE BLDC GLUCOMTR-MCNC: 200 MG/DL (ref 70–99)
GLUCOSE SERPL-MCNC: 166 MG/DL (ref 65–99)
GLUCOSE UR STRIP-MCNC: NEGATIVE MG/DL
HCT VFR BLD AUTO: 35.7 % (ref 37.5–51)
HGB BLD-MCNC: 12.6 G/DL (ref 13–17.7)
HGB UR QL STRIP.AUTO: NEGATIVE
HOLD SPECIMEN: NORMAL
HOLD SPECIMEN: NORMAL
IMM GRANULOCYTES # BLD AUTO: 0.03 10*3/MM3 (ref 0–0.05)
IMM GRANULOCYTES NFR BLD AUTO: 0.3 % (ref 0–0.5)
INR PPP: 0.97 (ref 0.86–1.15)
KETONES UR QL STRIP: NEGATIVE
LEUKOCYTE ESTERASE UR QL STRIP.AUTO: NEGATIVE
LYMPHOCYTES # BLD AUTO: 3.73 10*3/MM3 (ref 0.7–3.1)
LYMPHOCYTES NFR BLD AUTO: 36 % (ref 19.6–45.3)
MCH RBC QN AUTO: 31.4 PG (ref 26.6–33)
MCHC RBC AUTO-ENTMCNC: 35.3 G/DL (ref 31.5–35.7)
MCV RBC AUTO: 89 FL (ref 79–97)
MONOCYTES # BLD AUTO: 0.68 10*3/MM3 (ref 0.1–0.9)
MONOCYTES NFR BLD AUTO: 6.6 % (ref 5–12)
NEUTROPHILS NFR BLD AUTO: 5.6 10*3/MM3 (ref 1.7–7)
NEUTROPHILS NFR BLD AUTO: 54 % (ref 42.7–76)
NITRITE UR QL STRIP: NEGATIVE
NRBC BLD AUTO-RTO: 0 /100 WBC (ref 0–0.2)
PH UR STRIP.AUTO: 7 [PH] (ref 5–8)
PLATELET # BLD AUTO: 297 10*3/MM3 (ref 140–450)
PMV BLD AUTO: 9.5 FL (ref 6–12)
POTASSIUM SERPL-SCNC: 3.8 MMOL/L (ref 3.5–5.2)
PROT SERPL-MCNC: 6.8 G/DL (ref 6–8.5)
PROT UR QL STRIP: NEGATIVE
PROTHROMBIN TIME: 13 SECONDS (ref 11.8–14.9)
RBC # BLD AUTO: 4.01 10*6/MM3 (ref 4.14–5.8)
RH BLD: POSITIVE
RH BLD: POSITIVE
SODIUM SERPL-SCNC: 140 MMOL/L (ref 136–145)
SP GR UR STRIP: <=1.005 (ref 1–1.03)
T&S EXPIRATION DATE: NORMAL
TROPONIN T SERPL HS-MCNC: 13 NG/L
UROBILINOGEN UR QL STRIP: NORMAL
WBC NRBC COR # BLD: 10.36 10*3/MM3 (ref 3.4–10.8)
WHOLE BLOOD HOLD COAG: NORMAL
WHOLE BLOOD HOLD SPECIMEN: NORMAL

## 2023-04-05 PROCEDURE — 86901 BLOOD TYPING SEROLOGIC RH(D): CPT | Performed by: EMERGENCY MEDICINE

## 2023-04-05 PROCEDURE — G0378 HOSPITAL OBSERVATION PER HR: HCPCS

## 2023-04-05 PROCEDURE — 99285 EMERGENCY DEPT VISIT HI MDM: CPT

## 2023-04-05 PROCEDURE — 86900 BLOOD TYPING SEROLOGIC ABO: CPT

## 2023-04-05 PROCEDURE — 0042T HC CT CEREBRAL PERFUSION W/WO CONTRAST: CPT

## 2023-04-05 PROCEDURE — 25510000001 IOPAMIDOL PER 1 ML

## 2023-04-05 PROCEDURE — 93010 ELECTROCARDIOGRAM REPORT: CPT | Performed by: INTERNAL MEDICINE

## 2023-04-05 PROCEDURE — 83036 HEMOGLOBIN GLYCOSYLATED A1C: CPT | Performed by: INTERNAL MEDICINE

## 2023-04-05 PROCEDURE — 86900 BLOOD TYPING SEROLOGIC ABO: CPT | Performed by: EMERGENCY MEDICINE

## 2023-04-05 PROCEDURE — 70496 CT ANGIOGRAPHY HEAD: CPT

## 2023-04-05 PROCEDURE — 86901 BLOOD TYPING SEROLOGIC RH(D): CPT

## 2023-04-05 PROCEDURE — 86850 RBC ANTIBODY SCREEN: CPT | Performed by: EMERGENCY MEDICINE

## 2023-04-05 PROCEDURE — 85730 THROMBOPLASTIN TIME PARTIAL: CPT

## 2023-04-05 PROCEDURE — 82962 GLUCOSE BLOOD TEST: CPT

## 2023-04-05 PROCEDURE — 93005 ELECTROCARDIOGRAM TRACING: CPT

## 2023-04-05 PROCEDURE — 70450 CT HEAD/BRAIN W/O DYE: CPT

## 2023-04-05 PROCEDURE — 84484 ASSAY OF TROPONIN QUANT: CPT

## 2023-04-05 PROCEDURE — 80053 COMPREHEN METABOLIC PANEL: CPT

## 2023-04-05 PROCEDURE — 93005 ELECTROCARDIOGRAM TRACING: CPT | Performed by: EMERGENCY MEDICINE

## 2023-04-05 PROCEDURE — 85610 PROTHROMBIN TIME: CPT

## 2023-04-05 PROCEDURE — 85025 COMPLETE CBC W/AUTO DIFF WBC: CPT

## 2023-04-05 PROCEDURE — 71045 X-RAY EXAM CHEST 1 VIEW: CPT

## 2023-04-05 PROCEDURE — 70498 CT ANGIOGRAPHY NECK: CPT

## 2023-04-05 PROCEDURE — 81003 URINALYSIS AUTO W/O SCOPE: CPT | Performed by: EMERGENCY MEDICINE

## 2023-04-05 RX ORDER — SODIUM CHLORIDE 0.9 % (FLUSH) 0.9 %
10 SYRINGE (ML) INJECTION AS NEEDED
Status: DISCONTINUED | OUTPATIENT
Start: 2023-04-05 | End: 2023-04-07 | Stop reason: HOSPADM

## 2023-04-05 RX ORDER — CLOPIDOGREL BISULFATE 75 MG/1
300 TABLET ORAL ONCE
Status: COMPLETED | OUTPATIENT
Start: 2023-04-05 | End: 2023-04-05

## 2023-04-05 RX ORDER — FUROSEMIDE 20 MG/1
20 TABLET ORAL DAILY
COMMUNITY

## 2023-04-05 RX ORDER — ATORVASTATIN CALCIUM 10 MG/1
10 TABLET, FILM COATED ORAL DAILY
COMMUNITY
End: 2023-04-07 | Stop reason: HOSPADM

## 2023-04-05 RX ADMIN — CLOPIDOGREL BISULFATE 300 MG: 75 TABLET ORAL at 23:48

## 2023-04-05 RX ADMIN — IOPAMIDOL 100 ML: 755 INJECTION, SOLUTION INTRAVENOUS at 23:08

## 2023-04-05 RX ADMIN — IOPAMIDOL 100 ML: 755 INJECTION, SOLUTION INTRAVENOUS at 23:11

## 2023-04-06 ENCOUNTER — APPOINTMENT (OUTPATIENT)
Dept: MRI IMAGING | Facility: HOSPITAL | Age: 53
End: 2023-04-06
Payer: COMMERCIAL

## 2023-04-06 PROBLEM — I63.9 ACUTE CVA (CEREBROVASCULAR ACCIDENT): Status: ACTIVE | Noted: 2023-04-06

## 2023-04-06 LAB
ALBUMIN SERPL-MCNC: 3.9 G/DL (ref 3.5–5.2)
ALBUMIN/GLOB SERPL: 1.6 G/DL
ALP SERPL-CCNC: 97 U/L (ref 39–117)
ALT SERPL W P-5'-P-CCNC: 16 U/L (ref 1–41)
ANION GAP SERPL CALCULATED.3IONS-SCNC: 8.5 MMOL/L (ref 5–15)
AST SERPL-CCNC: 14 U/L (ref 1–40)
BASOPHILS # BLD AUTO: 0.04 10*3/MM3 (ref 0–0.2)
BASOPHILS NFR BLD AUTO: 0.5 % (ref 0–1.5)
BILIRUB SERPL-MCNC: 0.8 MG/DL (ref 0–1.2)
BUN SERPL-MCNC: 12 MG/DL (ref 6–20)
BUN/CREAT SERPL: 16.4 (ref 7–25)
CALCIUM SPEC-SCNC: 8.7 MG/DL (ref 8.6–10.5)
CHLORIDE SERPL-SCNC: 104 MMOL/L (ref 98–107)
CO2 SERPL-SCNC: 25.5 MMOL/L (ref 22–29)
CREAT SERPL-MCNC: 0.73 MG/DL (ref 0.76–1.27)
DEPRECATED RDW RBC AUTO: 42.2 FL (ref 37–54)
EGFRCR SERPLBLD CKD-EPI 2021: 110.2 ML/MIN/1.73
EOSINOPHIL # BLD AUTO: 0.24 10*3/MM3 (ref 0–0.4)
EOSINOPHIL NFR BLD AUTO: 3.3 % (ref 0.3–6.2)
ERYTHROCYTE [DISTWIDTH] IN BLOOD BY AUTOMATED COUNT: 13.2 % (ref 12.3–15.4)
GLOBULIN UR ELPH-MCNC: 2.4 GM/DL
GLUCOSE BLDC GLUCOMTR-MCNC: 252 MG/DL (ref 70–99)
GLUCOSE BLDC GLUCOMTR-MCNC: 262 MG/DL (ref 70–99)
GLUCOSE BLDC GLUCOMTR-MCNC: 87 MG/DL (ref 70–99)
GLUCOSE SERPL-MCNC: 253 MG/DL (ref 65–99)
HBA1C MFR BLD: 10.4 % (ref 4.8–5.6)
HCT VFR BLD AUTO: 34.3 % (ref 37.5–51)
HGB BLD-MCNC: 12.4 G/DL (ref 13–17.7)
IMM GRANULOCYTES # BLD AUTO: 0.01 10*3/MM3 (ref 0–0.05)
IMM GRANULOCYTES NFR BLD AUTO: 0.1 % (ref 0–0.5)
LYMPHOCYTES # BLD AUTO: 1.69 10*3/MM3 (ref 0.7–3.1)
LYMPHOCYTES NFR BLD AUTO: 23 % (ref 19.6–45.3)
MAGNESIUM SERPL-MCNC: 1.7 MG/DL (ref 1.6–2.6)
MCH RBC QN AUTO: 31.8 PG (ref 26.6–33)
MCHC RBC AUTO-ENTMCNC: 36.2 G/DL (ref 31.5–35.7)
MCV RBC AUTO: 87.9 FL (ref 79–97)
MONOCYTES # BLD AUTO: 0.68 10*3/MM3 (ref 0.1–0.9)
MONOCYTES NFR BLD AUTO: 9.3 % (ref 5–12)
NEUTROPHILS NFR BLD AUTO: 4.68 10*3/MM3 (ref 1.7–7)
NEUTROPHILS NFR BLD AUTO: 63.8 % (ref 42.7–76)
NRBC BLD AUTO-RTO: 0 /100 WBC (ref 0–0.2)
PHOSPHATE SERPL-MCNC: 3.7 MG/DL (ref 2.5–4.5)
PLATELET # BLD AUTO: 268 10*3/MM3 (ref 140–450)
PMV BLD AUTO: 9.2 FL (ref 6–12)
POTASSIUM SERPL-SCNC: 3.7 MMOL/L (ref 3.5–5.2)
PROT SERPL-MCNC: 6.3 G/DL (ref 6–8.5)
QT INTERVAL: 416 MS
RBC # BLD AUTO: 3.9 10*6/MM3 (ref 4.14–5.8)
SODIUM SERPL-SCNC: 138 MMOL/L (ref 136–145)
WBC NRBC COR # BLD: 7.34 10*3/MM3 (ref 3.4–10.8)

## 2023-04-06 PROCEDURE — 63710000001 INSULIN LISPRO (HUMAN) PER 5 UNITS: Performed by: INTERNAL MEDICINE

## 2023-04-06 PROCEDURE — 99223 1ST HOSP IP/OBS HIGH 75: CPT | Performed by: STUDENT IN AN ORGANIZED HEALTH CARE EDUCATION/TRAINING PROGRAM

## 2023-04-06 PROCEDURE — G0378 HOSPITAL OBSERVATION PER HR: HCPCS

## 2023-04-06 PROCEDURE — 80053 COMPREHEN METABOLIC PANEL: CPT | Performed by: STUDENT IN AN ORGANIZED HEALTH CARE EDUCATION/TRAINING PROGRAM

## 2023-04-06 PROCEDURE — 94761 N-INVAS EAR/PLS OXIMETRY MLT: CPT

## 2023-04-06 PROCEDURE — 83735 ASSAY OF MAGNESIUM: CPT | Performed by: STUDENT IN AN ORGANIZED HEALTH CARE EDUCATION/TRAINING PROGRAM

## 2023-04-06 PROCEDURE — 82962 GLUCOSE BLOOD TEST: CPT

## 2023-04-06 PROCEDURE — 85025 COMPLETE CBC W/AUTO DIFF WBC: CPT | Performed by: STUDENT IN AN ORGANIZED HEALTH CARE EDUCATION/TRAINING PROGRAM

## 2023-04-06 PROCEDURE — 70551 MRI BRAIN STEM W/O DYE: CPT

## 2023-04-06 PROCEDURE — 99222 1ST HOSP IP/OBS MODERATE 55: CPT | Performed by: PSYCHIATRY & NEUROLOGY

## 2023-04-06 PROCEDURE — 94799 UNLISTED PULMONARY SVC/PX: CPT

## 2023-04-06 PROCEDURE — 84100 ASSAY OF PHOSPHORUS: CPT | Performed by: STUDENT IN AN ORGANIZED HEALTH CARE EDUCATION/TRAINING PROGRAM

## 2023-04-06 RX ORDER — ASPIRIN 300 MG/1
300 SUPPOSITORY RECTAL DAILY
Status: DISCONTINUED | OUTPATIENT
Start: 2023-04-06 | End: 2023-04-07 | Stop reason: HOSPADM

## 2023-04-06 RX ORDER — SODIUM CHLORIDE 0.9 % (FLUSH) 0.9 %
10 SYRINGE (ML) INJECTION AS NEEDED
Status: DISCONTINUED | OUTPATIENT
Start: 2023-04-06 | End: 2023-04-07 | Stop reason: HOSPADM

## 2023-04-06 RX ORDER — CLOPIDOGREL BISULFATE 75 MG/1
75 TABLET ORAL DAILY
Status: DISCONTINUED | OUTPATIENT
Start: 2023-04-06 | End: 2023-04-07 | Stop reason: HOSPADM

## 2023-04-06 RX ORDER — SODIUM CHLORIDE 9 MG/ML
40 INJECTION, SOLUTION INTRAVENOUS AS NEEDED
Status: DISCONTINUED | OUTPATIENT
Start: 2023-04-06 | End: 2023-04-07 | Stop reason: HOSPADM

## 2023-04-06 RX ORDER — SODIUM CHLORIDE 0.9 % (FLUSH) 0.9 %
10 SYRINGE (ML) INJECTION EVERY 12 HOURS SCHEDULED
Status: DISCONTINUED | OUTPATIENT
Start: 2023-04-06 | End: 2023-04-07 | Stop reason: HOSPADM

## 2023-04-06 RX ORDER — CARVEDILOL 25 MG/1
25 TABLET ORAL 2 TIMES DAILY
Status: DISCONTINUED | OUTPATIENT
Start: 2023-04-06 | End: 2023-04-06

## 2023-04-06 RX ORDER — DEXTROSE MONOHYDRATE 25 G/50ML
10-50 INJECTION, SOLUTION INTRAVENOUS
Status: DISCONTINUED | OUTPATIENT
Start: 2023-04-06 | End: 2023-04-06 | Stop reason: SDUPTHER

## 2023-04-06 RX ORDER — NICOTINE POLACRILEX 4 MG
15 LOZENGE BUCCAL
Status: DISCONTINUED | OUTPATIENT
Start: 2023-04-06 | End: 2023-04-06 | Stop reason: SDUPTHER

## 2023-04-06 RX ORDER — NICOTINE POLACRILEX 4 MG
15 LOZENGE BUCCAL
Status: DISCONTINUED | OUTPATIENT
Start: 2023-04-06 | End: 2023-04-07 | Stop reason: HOSPADM

## 2023-04-06 RX ORDER — SPIRONOLACTONE 25 MG/1
25 TABLET ORAL DAILY
Status: DISCONTINUED | OUTPATIENT
Start: 2023-04-06 | End: 2023-04-06

## 2023-04-06 RX ORDER — INSULIN LISPRO 100 [IU]/ML
1-200 INJECTION, SOLUTION INTRAVENOUS; SUBCUTANEOUS
Status: DISCONTINUED | OUTPATIENT
Start: 2023-04-06 | End: 2023-04-06

## 2023-04-06 RX ORDER — ATORVASTATIN CALCIUM 40 MG/1
80 TABLET, FILM COATED ORAL NIGHTLY
Status: DISCONTINUED | OUTPATIENT
Start: 2023-04-06 | End: 2023-04-07 | Stop reason: HOSPADM

## 2023-04-06 RX ORDER — DEXTROSE MONOHYDRATE 25 G/50ML
25 INJECTION, SOLUTION INTRAVENOUS
Status: DISCONTINUED | OUTPATIENT
Start: 2023-04-06 | End: 2023-04-07 | Stop reason: HOSPADM

## 2023-04-06 RX ORDER — INSULIN LISPRO 100 [IU]/ML
2-9 INJECTION, SOLUTION INTRAVENOUS; SUBCUTANEOUS
Status: DISCONTINUED | OUTPATIENT
Start: 2023-04-06 | End: 2023-04-07 | Stop reason: HOSPADM

## 2023-04-06 RX ORDER — INSULIN LISPRO 100 [IU]/ML
1-200 INJECTION, SOLUTION INTRAVENOUS; SUBCUTANEOUS AS NEEDED
Status: DISCONTINUED | OUTPATIENT
Start: 2023-04-06 | End: 2023-04-06

## 2023-04-06 RX ORDER — ASPIRIN 81 MG/1
81 TABLET, CHEWABLE ORAL DAILY
Status: DISCONTINUED | OUTPATIENT
Start: 2023-04-06 | End: 2023-04-07 | Stop reason: HOSPADM

## 2023-04-06 RX ADMIN — Medication 10 ML: at 11:56

## 2023-04-06 RX ADMIN — INSULIN LISPRO 1 UNITS: 100 INJECTION, SOLUTION INTRAVENOUS; SUBCUTANEOUS at 17:17

## 2023-04-06 RX ADMIN — CLOPIDOGREL BISULFATE 75 MG: 75 TABLET ORAL at 11:46

## 2023-04-06 RX ADMIN — ATORVASTATIN CALCIUM 80 MG: 40 TABLET, FILM COATED ORAL at 22:16

## 2023-04-06 RX ADMIN — ASPIRIN 81 MG 81 MG: 81 TABLET ORAL at 11:46

## 2023-04-06 RX ADMIN — Medication 10 ML: at 22:16

## 2023-04-06 RX ADMIN — INSULIN LISPRO 1.2 UNITS: 100 INJECTION, SOLUTION INTRAVENOUS; SUBCUTANEOUS at 11:55

## 2023-04-06 NOTE — CASE MANAGEMENT/SOCIAL WORK
Discharge Planning Assessment   He     Patient Name: Demetrio Mariano  MRN: [id number]  Today's Date: 4/6/2023    Admit Date: 4/5/2023    Plan: Pt is independent at home. Pt lives with wife. PCP: MOHINI Mayorga Pharm: Jelena Balderrama. Pt plans to return home at discharge. Pt does not use DME in the home, Pt does not feel he will have needs at discharge.   Discharge Needs Assessment     Row Name 04/06/23 1237       Living Environment    People in Home spouse    Current Living Arrangements home    Potentially Unsafe Housing Conditions none    Primary Care Provided by self    Provides Primary Care For no one    Family Caregiver if Needed spouse    Quality of Family Relationships helpful;involved;supportive       Resource/Environmental Concerns    Resource/Environmental Concerns none    Transportation Concerns none       Food Insecurity    Within the past 12 months, you worried that your food would run out before you got the money to buy more. Never true    Within the past 12 months, the food you bought just didn't last and you didn't have money to get more. Never true       Transition Planning    Patient/Family Anticipates Transition to home with family    Patient/Family Anticipated Services at Transition none       Discharge Needs Assessment    Readmission Within the Last 30 Days no previous admission in last 30 days    Equipment Currently Used at Home none    Discharge Coordination/Progress Pt is independent at home. Pt lives with wife. PCP: MOHINI Mayorga Pharm: Jelena Balderrama. Pt plans to return home at discharge. Pt does not use DME in the home, Pt does not feel he will have needs at discharge.               Discharge Plan     Row Name 04/06/23 1240       Plan    Plan Pt is independent at home. Pt lives with wife. PCP: MOHINI Mayorga Pharm: Jelena Balderrama. Pt plans to return home at discharge. Pt does not use DME in the home, Pt does not feel he will have needs at discharge.               Continued Care and Services - Admitted Since 4/5/2023    Coordination has not been started for this encounter.          Demographic Summary     Row Name 04/06/23 1233       General Information    Admission Type observation    Arrived From emergency department    Referral Source admission list    Reason for Consult discharge planning    Preferred Language English       Contact Information    Permission Granted to Share Info With lay caregiver    Contact Information Obtained for lay caregiver       Lay Caregiver Information    Name, Lay Caregiver Judy Mariano    Phone, Lay Caregiver [phone]               Functional Status     Row Name 04/06/23 1234       Functional Status    Usual Activity Tolerance excellent    Current Activity Tolerance excellent       Physical Activity    On average, how many days per week do you engage in moderate to strenuous exercise (like a brisk walk)? 0 days    On average, how many minutes do you engage in exercise at this level? 0 min    Number of minutes of exercise per week 0       Assessment of Health Literacy    How often do you have someone help you read hospital materials? Never    How often do you have problems learning about your medical condition because of difficulty understanding written information? Never    How often do you have a problem understanding what is told to you about your medical condition? Never    How confident are you filling out medical forms by yourself? Extremely    Health Literacy Excellent       Functional Status, IADL    Medications independent    Meal Preparation independent    Housekeeping independent    Laundry independent    Shopping independent       Mental Status    General Appearance WDL WDL       Mental Status Summary    Recent Changes in Mental Status/Cognitive Functioning no changes       Employment/    Employment Status employed full-time    Shift Worked first shift               Psychosocial    No documentation.                 Abuse/Neglect    No documentation.                Legal     Row Name 04/06/23 1236       Financial Resource Strain    How hard is it for you to pay for the very basics like food, housing, medical care, and heating? Not hard       Financial/Legal    Source of Income salary/wages    Application for Public Assistance not applied       Legal    Criminal Activity/Legal Involvement none               Substance Abuse    No documentation.                Patient Forms    No documentation.                   Huong Vallecillo

## 2023-04-06 NOTE — CONSULTS
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   Pt reports he went to bed at 5:45 am, last known well, and then woke up at 1400 with slurred speech, and imbalance. He denied headache, he takes ASA at home, he denied any new weakness, numbness, vision changes.   NIH 3: L facial droop, dysarthria, L face numbness, R leg numbness.   CT head I personally Reviewed the CT Head and it Showed recent pontine cva, no hemorrhage.   Diff Dx: recrudescence of recent cva, new cva, other, out of IV thrombolytic window, and also cva in last 90 days.   Rec:   - CTA head neck CTP r/o LVO   - Bolus with Clopidogrel 300 mg bolus x1 and initiate dual antiplatelet therapy with Aspirin 81 mg daily and Clopidogrel 75 mg daily   - MRI brain   - cva work up   - pls call us right away with change in exam   - permissive HTN   - further work up depending on MRI results and clinical course, pls call us for follow up   d/w pt, RN   d/w ER attending the above recs.    Our recommendations are outlined below.    Recommendations:        •  Stroke/Telemetry Floor      •  Neuro Checks      •  Bedside Swallow Eval      •  DVT Prophylaxis      •  IV Fluids, Normal Saline      •  Head of Bed 30 Degrees      •  Euglycemia and Avoid Hyperthermia (PRN Acetaminophen)      •  Bolus with Clopidogrel 300 mg bolus x1 and initiate dual antiplatelet therapy with Aspirin 81 mg daily and Clopidogrel 75 mg daily      •  Antihypertensives PRN if Blood pressure is greater than 220/120 or there is a concern for End organ damage/contraindications for permissive HTN. If blood pressure is greater than 220/120 give labetalol PO or  IV or Vasotec IV with a goal of 15% reduction in BP during the first 24 hours.      Sign Out:      •  Discussed with Emergency Department Provider        ------------------------------------------------------------------------------    Advanced Imaging:  CTA Head and Neck Ordered:    CTP Ordered:      Metrics:  Last Known Well: 04/05/2023 05:45:00  [facility] Notification Time: 04/05/2023 22:22:56  Arrival Time: 04/05/2023 22:12:00  Stamp Time: 04/05/2023 22:23:09  Initial Response Time: 04/05/2023 22:30:41  Symptoms: slurred speech.  NIHSS Start Assessment Time: 04/05/2023 22:32:52  Patient is not a candidate for Thrombolytic.  Thrombolytic Medical Decision: 04/05/2023 22:35:35  Patient was not deemed candidate for Thrombolytic because of following reasons:  Last Well Known Above 4.5 Hours.  Stroke in last 3 months.    I personally Reviewed the CT Head and it Showed recent pontine cva, no hemorrhage.    ED Physician notified of diagnostic impression and management plan on 04/05/2023 22:47:07        ------------------------------------------------------------------------------    History of Present Illness:  Patient is a 52 year old Male.    Patient was brought by private transportation with symptoms of slurred speech.  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  Pt reports he went to bed at 5:45 am, last known well, and then woke up at 1400 with slurred speech, and imbalance. He denied headache, he takes ASA at home, he denied any new weakness, numbness, vision changes.  NIH 3: L facial droop, dysarthria, L face numbness, R leg numbness.      Past Medical History:      • Diabetes Mellitus      • Stroke    Medications:    No Anticoagulant use   Antiplatelet use: Yes asa  Reviewed EMR for current medications    Allergies:   Reviewed    Social History:  Smoking: No    Family History:    There is no family history of premature cerebrovascular  disease pertinent to this consultation    ROS :  14 Points Review of Systems was performed and was negative except mentioned in HPI.    Past Surgical History:  There Is No Surgical History Contributory To Today’s Visit        Examination:  BP(195/88), Pulse(77), Blood Glucose(200)  1A: Level of Consciousness - Alert; keenly responsive + 0  1B: Ask Month and Age - Both Questions Right + 0  1C: Blink Eyes & Squeeze Hands - Performs Both Tasks + 0  2: Test Horizontal Extraocular Movements - Normal + 0  3: Test Visual Fields - No Visual Loss + 0  4: Test Facial Palsy (Use Grimace if Obtunded) - Minor paralysis (flat nasolabial fold, smile asymmetry) + 1  5A: Test Left Arm Motor Drift - No Drift for 10 Seconds + 0  5B: Test Right Arm Motor Drift - No Drift for 10 Seconds + 0  6A: Test Left Leg Motor Drift - No Drift for 5 Seconds + 0  6B: Test Right Leg Motor Drift - No Drift for 5 Seconds + 0  7: Test Limb Ataxia (FNF/Heel-Shin) - No Ataxia + 0  8: Test Sensation - Mild-Moderate Loss: Less Sharp/More Dull + 1  9: Test Language/Aphasia - Normal; No aphasia + 0  10: Test Dysarthria - Mild-Moderate Dysarthria: Slurring but can be understood + 1  11: Test Extinction/Inattention - No abnormality + 0    NIHSS Score: 3      Pre-Morbid Modified [place] Scale:  0 Points = No symptoms at all    I reviewed the available imaging via A.I. software Rapid and initiated discussion with the primary provider    Patient/Family was informed the Neurology Consult would occur via TeleHealth consult by way of interactive audio and video telecommunications and consented to receiving care in this manner.      Patient is being evaluated for possible acute neurologic impairment and high probability of imminent or life-threatening deterioration. I spent total of 45 minutes providing care to this patient, including time for face to face visit via telemedicine, review of medical records, imaging studies and discussion of findings with providers, the  patient and/or family.      Dr Roxann Mohan      [facility]  [phone]      Case [id number]  Advanced Imaging:    CTA Head and Neck Completed.    CTP Completed.    LVO:No    not acute thrombectomy candidate.

## 2023-04-06 NOTE — PROGRESS NOTES
"DAILY PROGRESS NOTE  [facility]      PATIENT IDENTIFICATION    Name: Demetrio Mariano  :  1970  MRN: [id number]    CHIEF COMPLAINT/PRINCIPAL DIAGNOSIS: Acute CVA (cerebrovascular accident)       SUBJECTIVE    Still with weakness on R side and dysarthria    ROS:   Gen: No fever or chills  CV: no chest pain or palpitation  Resp: no shortness of breath or cough  GI: no nausea, vomiting, diarrhea  Neuro: no headache or dizziness     OBJECTIVE     Exam:  /61 (BP Location: Right arm, Patient Position: Lying)   Pulse 65   Temp 97.3 °F (36.3 °C) (Oral)   Resp 20   Ht 170.2 cm (67\")   Wt 76.4 kg (168 lb 6.9 oz)   SpO2 97%   BMI 26.38 kg/m²   Intake/Output last 24 hours:    Intake/Output Summary (Last 24 hours) at 2023 1120  Last data filed at 2023 0500  Gross per 24 hour   Intake 255 ml   Output --   Net 255 ml        Gen: NAD, up in bed  Resp: CTAB, no increased work of breathing  CV: RRR, no m/r/g. No peripheral edema  GI: Soft, nontender, (+) BS. nondistended  Psych: Alert and Oriented x 3, Mood and affect appropriate to situation  Skin: warm and dry on palpation. No rash on inspection.  Neuro: R side strength 3/4/5, dysarthria    DATA REVIEW:  Lab Results (last 24 hours)     Procedure Component Value Units Date/Time    Hemoglobin A1c [[id number]] Collected: 23    Specimen: Blood Updated: 23 1110    POC Glucose Once [[id number]]  (Normal) Collected: 23    Specimen: Blood Updated: 23 08     Glucose 87 mg/dL      Comment: Serial Number: [id number]:  [id number]       [place] Draw [[id number]] Collected: 23    Specimen: Blood Updated: 23    Narrative:      The following orders were created for panel order [place] Draw.  Procedure                               Abnormality         Status                     ---------                               -----------         ------                     Green Top (Gel)[[id number]]           "                        Final result               Lavender Top[[id number]]                                     Final result               Gold Top - SST[[id number]]                                   Final result               Light Blue Top[[id number]]                                   Final result                 Please view results for these tests on the individual orders.    Green Top (Gel) [[id number]] Collected: 04/05/23 2233    Specimen: Blood Updated: 04/05/23 2345     Extra Tube Hold for add-ons.     Comment: Auto resulted.       Lavender Top [[id number]] Collected: 04/05/23 2233    Specimen: Blood Updated: 04/05/23 2345     Extra Tube hold for add-on     Comment: Auto resulted       Gold Top - SST [[id number]] Collected: 04/05/23 2233    Specimen: Blood Updated: 04/05/23 2345     Extra Tube Hold for add-ons.     Comment: Auto resulted.       Light Blue Top [[id number]] Collected: 04/05/23 2233    Specimen: Blood Updated: 04/05/23 2345     Extra Tube Hold for add-ons.     Comment: Auto resulted       Urinalysis With Microscopic If Indicated (No Culture) - Urine, Clean Catch [[id number]]  (Normal) Collected: 04/05/23 2325    Specimen: Urine, Clean Catch Updated: 04/05/23 2341     Color, UA Yellow     Appearance, UA Clear     pH, UA 7.0     Specific Gravity, UA <=1.005     Glucose, UA Negative     Ketones, UA Negative     Bilirubin, UA Negative     Blood, UA Negative     Protein, UA Negative     Leuk Esterase, UA Negative     Nitrite, UA Negative     Urobilinogen, UA 0.2 E.U./dL    Narrative:      Urine microscopic not indicated.    Single High Sensitivity Troponin T [[id number]]  (Normal) Collected: 04/05/23 2233    Specimen: Blood Updated: 04/05/23 2302     HS Troponin T 13 ng/L     Narrative:      High Sensitive Troponin T Reference Range:  <10.0 ng/L- Negative Female for AMI  <15.0 ng/L- Negative Male for AMI  >=10 - Abnormal Female indicating possible myocardial injury.  >=15 - Abnormal Male indicating possible  myocardial injury.   Clinicians would have to utilize clinical acumen, EKG, Troponin, and serial changes to determine if it is an Acute Myocardial Infarction or myocardial injury due to an underlying chronic condition.         Comprehensive Metabolic Panel [[id number]]  (Abnormal) Collected: 04/05/23 2233    Specimen: Blood Updated: 04/05/23 2259     Glucose 166 mg/dL      BUN 15 mg/dL      Creatinine 0.87 mg/dL      Sodium 140 mmol/L      Potassium 3.8 mmol/L      Chloride 104 mmol/L      CO2 26.4 mmol/L      Calcium 9.3 mg/dL      Total Protein 6.8 g/dL      Albumin 4.3 g/dL      ALT (SGPT) 19 U/L      AST (SGOT) 14 U/L      Alkaline Phosphatase 103 U/L      Total Bilirubin 0.6 mg/dL      Globulin 2.5 gm/dL      A/G Ratio 1.7 g/dL      BUN/Creatinine Ratio 17.2     Anion Gap 9.6 mmol/L      eGFR 104.5 mL/min/1.73     Narrative:      GFR Normal >60  Chronic Kidney Disease <60  Kidney Failure <15      aPTT [[id number]]  (Normal) Collected: 04/05/23 2233    Specimen: Blood Updated: 04/05/23 2252     PTT 28.3 seconds     Protime-INR [[id number]]  (Normal) Collected: 04/05/23 2233    Specimen: Blood Updated: 04/05/23 2252     Protime 13.0 Seconds      INR 0.97    Narrative:      Suggested Therapeutic Ranges For Oral Anticoagulant Therapy:  Level of Therapy                      INR Target Range  Standard Dose                            2.0-3.0  High Dose                                2.5-3.5  Patients not receiving anticoagulant  Therapy Normal Range                     0.86-1.15    CBC & Differential [[id number]]  (Abnormal) Collected: 04/05/23 2233    Specimen: Blood Updated: 04/05/23 2242    Narrative:      The following orders were created for panel order CBC & Differential.  Procedure                               Abnormality         Status                     ---------                               -----------         ------                     CBC Auto Differential[[id number]]        Abnormal            Final result   "               Please view results for these tests on the individual orders.    CBC Auto Differential [[id number]]  (Abnormal) Collected: 04/05/23 2233    Specimen: Blood Updated: 04/05/23 2242     WBC 10.36 10*3/mm3      RBC 4.01 10*6/mm3      Hemoglobin 12.6 g/dL      Hematocrit 35.7 %      MCV 89.0 fL      MCH 31.4 pg      MCHC 35.3 g/dL      RDW 13.1 %      RDW-SD 42.5 fl      MPV 9.5 fL      Platelets 297 10*3/mm3      Neutrophil % 54.0 %      Lymphocyte % 36.0 %      Monocyte % 6.6 %      Eosinophil % 2.7 %      Basophil % 0.4 %      Immature Grans % 0.3 %      Neutrophils, Absolute 5.60 10*3/mm3      Lymphocytes, Absolute 3.73 10*3/mm3      Monocytes, Absolute 0.68 10*3/mm3      Eosinophils, Absolute 0.28 10*3/mm3      Basophils, Absolute 0.04 10*3/mm3      Immature Grans, Absolute 0.03 10*3/mm3      nRBC 0.0 /100 WBC     POC Glucose Once [[id number]]  (Abnormal) Collected: 04/05/23 2219    Specimen: Blood Updated: 04/05/23 2220     Glucose 200 mg/dL      Comment: Serial Number: [id number]:  [id number]             Imaging Results (Last 24 Hours)     Procedure Component Value Units Date/Time    CT Angiogram Neck [[id number]] Collected: 04/06/23 0045     Updated: 04/06/23 0051    Narrative:      PROCEDURE: CT ANGIOGRAM NECK     COMPARISON: 4/5/2023.     INDICATIONS: 52-YEAR-OLD MALE W/ H/O SLURRED SPEECH, TODAY; THE PATIENT WAS LAST KNOWN TO BE \"WELL\"   AT APPROXIMATELY 5:45 A.M. THIS MORNING; H/O STROKE; THE PT. TAKES 81 MG OF ASPIRIN, DAILY; NO   OTHER ANTICOAGULATION MEDICATIONS.     PROTOCOL:   Standard CTA imaging protocol performed.      RADIATION:   Total DLP: 558 mGy*cm.    Automated exposure control was utilized to minimize radiation dose.   CONTRAST: 70 mL Isovue 370 I.V.     TECHNIQUE: After obtaining the patient's consent, 846 CT/CTA images of the neck were obtained with   intravenous contrast.  Multi-planar/3-D imaging was created and interpreted to optimize   visualization of vascular anatomy.  " Unless otherwise stated in this report, all vascular stenoses   involving the internal carotid arteries, reported for this examination, are derived by dividing the   lesion diameter by the diameter of the normal internal carotid artery more distally (that is, using   NASCET criteria).     FINDINGS:   LEFT  INTERNAL CAROTID: No hemodynamically significant stenosis or dissection.  Calcified atherosclerotic   plaque involves the proximal left internal artery  EXTERNAL CAROTID: No hemodynamically significant stenosis or dissection.  Calcified atherosclerotic   plaque involves the origin of the left external carotid artery.  COMMON CAROTID: No hemodynamically significant stenosis or dissection.    VERTEBRAL: No hemodynamically significant stenosis or dissection.  The left vertebral artery is   thought to be dominant.     RIGHT  INTERNAL CAROTID: No hemodynamically significant stenosis or dissection.  Calcified atherosclerotic   plaque involves the proximal right internal carotid artery.  EXTERNAL CAROTID: No hemodynamically significant stenosis or dissection.    COMMON CAROTID: No hemodynamically significant stenosis or dissection.    VERTEBRAL: No hemodynamically significant stenosis or dissection.       OTHER: There are degenerative changes of the cervical spine, especially at C6-7.  Streak artifact   from dental amalgam obscures detail.  There is slight motion artifact on the study.  There is a   left-sided cardiac implantable electronic device (CIED) in place with associated streak artifact.    Mild emphysematous changes involve the partially imaged lung apices.       Impression:         1. No emergent large vessel occlusion is seen.    2. No hemodynamically significant stenoses are suspected.    3. No arterial dissection is identified.    4. The left vertebral artery is dominant.    5. Please see the cerebral CTA exam report (from the same day) for further detail regarding   additional findings.              Please  "note that portions of this note were completed with a voice recognition program.     TIEN LICONA JR, MD         Electronically Signed and Approved By: TIEN LICONA JR, MD on 4/06/2023 at 0:48                        CT Angiogram Head w AI Analysis of LVO [[id number]] Collected: 04/06/23 0040     Updated: 04/06/23 0050    Addenda:        ADDENDUM:      Upon further review, in the TECHNIQUE subheading (above), it incorrectly   states that the study was   performed without and with intravenous contrast agent administration.    Only with intravenous   contrast agent was used for this CTA exam.        Please note that portions of this note were completed with a voice   recognition program.     TIEN LICONA JR, MD         Electronically Signed and Approved By: TIEN LICONA JR, MD on 4/06/2023   at 0:47                      Signed: 04/06/23 0047 by Tien Licona MD    Narrative:      PROCEDURE: CT ANGIOGRAM HEAD W AI ANALYSIS OF LVO     COMPARISONS: 4/5/2023.     INDICATIONS: 52-YEAR-OLD MALE W/ H/O SLURRED SPEECH, TODAY; THE PATIENT WAS LAST KNOWN TO BE \"WELL\"   AT APPROXIMATELY 5:45 A.M. THIS MORNING; H/O STROKE; THE PT. TAKES 81 MG OF ASPIRIN, DAILY; NO   OTHER ANTICOAGULATION MEDICATIONS.     PROTOCOL:   Standard CTA imaging protocol performed.      RADIATION:   Total DLP:558 mGy*cm    Automated exposure control was utilized to minimize radiation dose.   CONTRAST: 70 mL Isovue 370 I.V.  RAPID: CTA imaging was analyzed using RAPID AI to enable computer assisted triage notification to   rapidly detect a large vessel occlusion (LVO) and shorten notification time     TECHNIQUE: After obtaining the patient's consent, 808 CT/CTA images of the head were obtained   without and with non-ionic contrast, and multi-planar/3-D imaging were created and interpreted to   optimize visualization of vascular anatomy.       FINDINGS: No emergent large vessel occlusion is seen.  The left vertebral artery is dominant.  "   Calcified atherosclerotic plaque involves the bilateral distal internal carotid arteries with   suspected (at least) mild-to-moderate stenoses.  Otherwise, no hemodynamically significant stenoses   are suspected.  The A1 segment of the right anterior cerebral artery (DEJUAN) is hypoplastic, which is   a normal variant.  The anterior communicating artery is patent.  The posterior communicating artery   on the left is small in caliber and is thought to be patent.  The right-sided posterior   communicating artery is not well seen.  It may be hypoplastic or absent.  There is thought to be   mild atherosclerotic change within the posterior divisions of the bilateral P2 segments of the   posterior cerebral arteries (PCAs).  There may be atherosclerotic change more distally within the   bilateral PCAs.  Mild atherosclerotic change is suggested involving the bilateral proximal middle   cerebral arteries (MCAs) without hemodynamically significant stenoses.  The left vertebral artery   is dominant.  The bilateral intradural vertebral arteries and the basilar artery are patent without   significant stenoses.  No cerebral aneurysm is suggested.  Incidentally, a left-sided cardiac   implantable electronic device (CIED) is identified on the  topogram and may be a   contraindication to brain MRI examination.       Impression:       No emergent large vessel occlusion is identified.  Please see above comments for   further detail.          Please note that portions of this note were completed with a voice recognition program.     TIEN BLANCO JR, MD         Electronically Signed and Approved By: TIEN BLANCO JR, MD on 4/06/2023 at 0:40                     XR Chest 1 View [[id number]] Collected: 04/06/23 0001     Updated: 04/06/23 0004    Narrative:      PROCEDURE: XR CHEST 1 VW     COMPARISON: 6/15/2021.     INDICATIONS: ACUTE STROKE PROTOCOL.     FINDINGS:  A single AP (or PA) upright portable chest radiograph was performed.  " No cardiac   enlargement is seen.  No pleural effusion or pneumothorax is identified.  An implantable loop   recorder (ILR) is thought to be in place, projected over the left subphrenic region, new since the   prior 6/15/2021 exam.  External artifacts obscure detail.  Chronic calcified granulomatous disease   involves the chest.  There is a left-sided cardiac implantable electronic device (CIED) in place,   seen previously, and unchanged in position since the prior study.  There is thought to be mild   degenerative change of the right acromioclavicular (AC) joint.  There is slight pulmonary   hypoinflation with mild bibasilar subsegmental atelectasis.  Acute infiltrate is thought to be less   likely.  Otherwise, no significant interval change is seen since the prior study (or studies).       Impression:        No acute infiltrate is appreciated.              Please note that portions of this note were completed with a voice recognition program.     TIEN BLANCO JR, MD         Electronically Signed and Approved By: TIEN BLANCO JR, MD on 4/06/2023 at 0:01                        CT CEREBRAL PERFUSION WITH & WITHOUT CONTRAST [[id number]] Collected: 04/05/23 2339     Updated: 04/05/23 2342    Narrative:      PROCEDURE: CT CEREBRAL PERFUSION W WO CONTRAST     COMPARISONS: 4/5/2023.     INDICATIONS: 52-YEAR-OLD MALE W/ H/O SLURRED SPEECH, TODAY; THE PATIENT WAS LAST KNOWN TO BE \"WELL\"   AT APPROXIMATELY 5:45 A.M. THIS MORNING; H/O STROKE; THE PT. TAKES 81 MG OF ASPIRIN, DAILY; NO   OTHER ANTICOAGULATION MEDICATIONS.     PROTOCOL:   Standard imaging protocol performed      RADIATION:   DLP: 646.8 mGy*cm    Automated exposure control was utilized to minimize radiation dose.   CONTRAST: 80 cc Isovue 370 I.V.     TECHNIQUE:  CT perfusion (CTP) was performed, utilizing a total of 80 mL of nonionic intravenous   contrast agent (as indicated above) with an injection rate of 5 mL/s.  A total of 8 cm of brain   coverage was " "used for the CTP study (in two 4 cm \"slabs\").  The images were processed using [employer]   software (by [employer], [place], [place]).  The CTP maps, including the cerebral blood volume   (CBV), cerebral blood flow (CBF), mean transit time (MTT), time to maximum (Tmax), and time to peak   (TTP), were reviewed as well as the summary map(s).  The examination appears to be technically   adequate.  There arterial input function (AIF) and the venous output function (VOF) appear to be   appropriately placed.     FINDINGS:     TOTAL HYPOPERFUSION:  Using a threshold of Tmax greater than 6 seconds, there is no convincing   evidence for an area of acute cerebral hypoperfusion within the imaged brain.       CORE INFARCT:  Using the threshold of CBF less than 30 percent, there is no convincing CTP evidence   for an acute ischemic core (infarct).     PENUMBRA:  No penumbra (or tissue at risk, TAR) is appreciated.        IMPRESSION:    There is no convincing CTP (or CT perfusion) evidence for a large-vessel acute brain infarct.        Please note that portions of this note were completed with a voice recognition program.     TIEN BLANCO JR, MD         Electronically Signed and Approved By: TIEN BLANCO JR, MD on 4/05/2023 at 23:39                        CT Head Without Contrast Stroke Protocol [[id number]] Collected: 04/05/23 2337     Updated: 04/05/23 2341    Narrative:      PROCEDURE: CT HEAD WO CONTRAST STROKE PROTOCOL     COMPARISON: None.     INDICATIONS: 52-YEAR-OLD MALE W/ H/O SLURRED SPEECH, TODAY; THE PATIENT WAS LAST KNOWN TO BE \"WELL\"   AT APPROXIMATELY 5:45 A.M. THIS MORNING; H/O STROKE; THE PT. TAKES 81 MG OF ASPIRIN, DAILY; NO   OTHER ANTICOAGULATION MEDICATIONS.     PROTOCOL:   Standard CT imaging protocol performed.      RADIATION:   Total DLP: 953.9 mGy*cm    MA and/or KV were/was adjusted to minimize radiation dose.       TECHNIQUE: After obtaining the patient's consent, 122 CT images were obtained " without non-ionic   intravenous contrast material.      DISCUSSION:   A routine nonenhanced head CT was performed. No acute brain abnormality is identified. No acute   intracranial hemorrhage. No acute infarction. No acute skull fracture. No midline shift or acute   intracranial mass effect is seen.  Mild chronic small vessel ischemia/infarction is suspected.   There are arterial calcifications. The extra-axial spaces and the ventricular system are mildly   prominent for the patient's age suggesting some degree of central atrophy.  Mild mucosal thickening   involves the imaged paranasal sinuses.  No air-fluid interfaces are seen within the imaged   paranasal sinuses.  No significant acute findings are seen with regard to the imaged orbits or   middle ear clefts.  There is incidental asymmetric pneumatization of the right petrous apex, which   is a normal variant.  Degenerative changes involve the bilateral temporomandibular joints, greater   on the right.       Impression:         No acute brain abnormality is seen.  No acute intracranial hemorrhage.  No acute infarct.           Please note that portions of this note were completed with a voice recognition program.     TIEN BLANCO JR, MD         Electronically Signed and Approved By: TIEN BLANCO JR, MD on 4/05/2023 at 23:37                              Labs and imaging noted above have been personally reviewed by me.    Scheduled Meds:aspirin, 81 mg, Oral, Daily   Or  aspirin, 300 mg, Rectal, Daily  atorvastatin, 80 mg, Oral, Nightly  clopidogrel, 75 mg, Oral, Daily  insulin lispro, 2-9 Units, Subcutaneous, TID With Meals  sodium chloride, 10 mL, Intravenous, Q12H      Continuous Infusions:   PRN Meds:•  dextrose  •  dextrose  •  glucagon (human recombinant)  •  sodium chloride  •  sodium chloride  •  sodium chloride    ASSESSMENT/PLAN      Acute CVA (cerebrovascular accident)    Likely acute CVA  - had acute R pontine infarct in 01/2023 -- now with dysarthria  and R sided weakness  - CT/CTP head unremarkable  - CTA H/N without LVO  - check MRI brain  - may need GERMAN so will discuss with neuro prior to ordering  - check lipid profile and A1c  - he has loop recorder and AICD in place -- get AICD interrogated to look for occult AFib  - on asa and statin. Plavix started by overnight hospitalist   - Neuro eval    Chronic combined systolic and diastolic HF  - EF on most recent echo from 03/2023 was 41%  - will plan to repeat echo here if MRI positive for acute stroke -- may need GERMAN  - hold home entresto, aldactone, coreg for permissive HTN today  - resume lasix in am    CAD  - LHC in 01/2021 showed non-obstructive CAD  - hs Trop normal on admission  - home asa, statin resumed  - hold bblocker to allow for permissive HTN today    HTN  - BP meds on hold for permissive HTN    DM2  - SSI for now      Nutrition - Diet: Cardiac Diets, Diabetic Diets; Healthy Heart (2-3 Na+); Consistent Carbohydrate; Texture: Regular Texture (IDDSI 7); Fluid Consistency: Thin (IDDSI 0)   DVT Prophylaxis - scds  Code Status - full   GI ppx - none  Disposition - tbd       Ronal Pringle MD  [facility]  4/6/2023  11:20 EDT

## 2023-04-06 NOTE — CONSULTS
Patient recently seen in the [facility] for pump management. Patients HbA1c discussed at that time. Patient not currently interacting with his insulin pump, not entering carbohydrates and glucose readings in order to closer match his physiological needs. Patients insulin pump is essentially only supplying a basal rate. Patients insulin pump and CGM do not communicate with one another.

## 2023-04-06 NOTE — H&P
[facility]  [facility]: [facility]   Patient Name: Demetrio Mariano  : 1970  MRN: [id number]  Primary Care Physician:  Kia Mayorga APRN  Date of admission: 2023    Subjective   Subjective     Chief Complaint: Dysarthria    HPI:    Demetrio Mariano is a 52 y.o. male with a past medical history of CHF, coronary artery disease, hypertension, diabetes, recent ischemic stroke in 2023 with very subtle dysarthria from the prior stroke presented to the ED for evaluation of slurring of the speech.  Patient went to bed around 5:30 AM yesterday morning which is as last well-known and then he woke up around 2 PM and noted to have slurring of speech, imbalance, numbness in his left face and right leg.  Denies any headache, focal weakness, tingling, visual changes, chest pain, palpitations.  Patient symptoms had somewhat improved by the time he had arrived to the ED.  During my examination, patient denied any numbness, tingling, focal weakness.  Speech is coherent but had some slurring during my examination.    In the ED, vital signs Temperature 97.8, pulse 67, respiratory 17, blood pressure 163/91 on room air saturating around 100%.  Labs showed normal CMP, normal CBC except hemoglobin 12.6, normal UA, chest x-ray showed no acute infiltrate.  CT head without contrast did not show any significant acute abnormality.  CTP brain showed no convincing CTP evidence for a large vessel acute brain infarct.  CTA head and neck did not show any emergent large vessel occlusion.  Patient has been evaluated by the teleneurologist, did not recommend thrombolytics as the patient is out of the window.,  Recommended MRI brain, daily 81 mg aspirin and Plavix loading dose followed by 75 mg daily.    Patient has been admitted for further evaluation and management of slurring of speech, concerning for new acute ischemic stroke or recrudescence of the previous stroke        Personal  History     Past Medical History:   Diagnosis Date   • CHF (congestive heart failure)    • Coronary artery disease involving native heart without angina pectoris      Nonobstructive coronary artery disease involving diagonal branch per cardiac catheterization done on 01/21/2021   • Essential hypertension, benign    • Hypercholesterolemia    • ICD (implantable cardioverter-defibrillator), dual, in situ 09/22/2021   • Nicotine dependence    • Non-ischemic cardiomyopathy (CMS/HCC) 05/03/2021    Mildly dilated left ventricular cavity with severe global hypokinesis with estimated LV ejection fraction of       28%. 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    • Type I diabetes mellitus, uncontrolled          Past Surgical History:   Procedure Laterality Date   • CARDIAC CATHETERIZATION     • CARDIAC ELECTROPHYSIOLOGY PROCEDURE N/A 06/15/2021    Procedure: ICD SC new;  Surgeon: Regulo Coyne MD;  Location: [facility] INVASIVE LOCATION;  Service: Cardiovascular;  Laterality: N/A;   • FRACTURE SURGERY Right     crushed heel injury with 14 fractures   • OTHER SURGICAL HISTORY  06/10/2013    plication for treatment of pyronie's disease   • VASECTOMY           Family History   Problem Relation Age of Onset   • Diabetes Mother    • Diabetes Father          Social History     Socioeconomic History   • Marital status:    • Number of children: 2   Tobacco Use   • Smoking status: Every Day     Packs/day: 1.00     Types: Cigarettes   • Smokeless tobacco: Never   Vaping Use   • Vaping Use: Never used   Substance and Sexual Activity   • Alcohol use: Yes     Alcohol/week: 1.0 standard drink     Types: 1 Cans of beer per week     Comment: social drinker    • Drug use: Not Currently   • Sexual activity: Defer         Home Medications:  Acetaminophen, Aspirin, FreeStyle Francis 2 Sensor, Glucagon, Insulin Aspart, atorvastatin, carvedilol, furosemide,  "sacubitril-valsartan, and spironolactone    Allergies:  No Known Allergies    Review of Systems     Objective   Objective     Vitals:   Temp:  [97.7 °F (36.5 °C)-98 °F (36.7 °C)] 97.7 °F (36.5 °C)  Heart Rate:  [67-80] 80  Resp:  [16-20] 20  BP: (118-195)/(66-91) 118/76    Physical Exam    Constitutional: Awake, alert, no acute distress   Eyes: Pupils equal, sclerae anicteric, no conjunctival injection   HENT: NCAT, mucous membranes moist   Neck: Supple, no thyromegaly, no lymphadenopathy, trachea midline   Respiratory: Clear to auscultation bilaterally, nonlabored respirations    Cardiovascular: RRR, no murmurs, rubs, or gallops, palpable pedal pulses bilaterally   Gastrointestinal: Positive bowel sounds, soft, nontender, nondistended   Musculoskeletal: No bilateral ankle edema, no clubbing or cyanosis to extremities   Psychiatric: Appropriate affect, cooperative   Neurologic: Oriented x 3, strength symmetric 5/5 bilateral upper and lower extremities, cranial nerves II through XII intact, Normal finger-to-nose test, mild slurring of speech   Skin: No rashes     Result Review    Result Review:  I have personally reviewed the results from the time of this admission to 4/6/2023 04:51 EDT and agree with these findings:  [x]  Laboratory  [x]  Microbiology  [x]  Radiology  [x]  EKG/Telemetry   []  Cardiology/Vascular   []  Pathology  []  Old records  []  Other:        Imaging Results (Last 24 Hours)     Procedure Component Value Units Date/Time    CT Angiogram Neck [[id number]] Collected: 04/06/23 0045     Updated: 04/06/23 0051    Narrative:      PROCEDURE: CT ANGIOGRAM NECK     COMPARISON: 4/5/2023.     INDICATIONS: 52-YEAR-OLD MALE W/ H/O SLURRED SPEECH, TODAY; THE PATIENT WAS LAST KNOWN TO BE \"WELL\"   AT APPROXIMATELY 5:45 A.M. THIS MORNING; H/O STROKE; THE PT. TAKES 81 MG OF ASPIRIN, DAILY; NO   OTHER ANTICOAGULATION MEDICATIONS.     PROTOCOL:   Standard CTA imaging protocol performed.      RADIATION:   Total DLP: " 558 mGy*cm.    Automated exposure control was utilized to minimize radiation dose.   CONTRAST: 70 mL Isovue 370 I.V.     TECHNIQUE: After obtaining the patient's consent, 846 CT/CTA images of the neck were obtained with   intravenous contrast.  Multi-planar/3-D imaging was created and interpreted to optimize   visualization of vascular anatomy.  Unless otherwise stated in this report, all vascular stenoses   involving the internal carotid arteries, reported for this examination, are derived by dividing the   lesion diameter by the diameter of the normal internal carotid artery more distally (that is, using   NASCET criteria).     FINDINGS:   LEFT  INTERNAL CAROTID: No hemodynamically significant stenosis or dissection.  Calcified atherosclerotic   plaque involves the proximal left internal artery  EXTERNAL CAROTID: No hemodynamically significant stenosis or dissection.  Calcified atherosclerotic   plaque involves the origin of the left external carotid artery.  COMMON CAROTID: No hemodynamically significant stenosis or dissection.    VERTEBRAL: No hemodynamically significant stenosis or dissection.  The left vertebral artery is   thought to be dominant.     RIGHT  INTERNAL CAROTID: No hemodynamically significant stenosis or dissection.  Calcified atherosclerotic   plaque involves the proximal right internal carotid artery.  EXTERNAL CAROTID: No hemodynamically significant stenosis or dissection.    COMMON CAROTID: No hemodynamically significant stenosis or dissection.    VERTEBRAL: No hemodynamically significant stenosis or dissection.       OTHER: There are degenerative changes of the cervical spine, especially at C6-7.  Streak artifact   from dental amalgam obscures detail.  There is slight motion artifact on the study.  There is a   left-sided cardiac implantable electronic device (CIED) in place with associated streak artifact.    Mild emphysematous changes involve the partially imaged lung apices.        "Impression:         1. No emergent large vessel occlusion is seen.    2. No hemodynamically significant stenoses are suspected.    3. No arterial dissection is identified.    4. The left vertebral artery is dominant.    5. Please see the cerebral CTA exam report (from the same day) for further detail regarding   additional findings.              Please note that portions of this note were completed with a voice recognition program.     TIEN LICONA JR, MD         Electronically Signed and Approved By: TIEN LICONA JR, MD on 4/06/2023 at 0:48                        CT Angiogram Head w AI Analysis of LVO [[id number]] Collected: 04/06/23 0040     Updated: 04/06/23 0050    Addenda:        ADDENDUM:      Upon further review, in the TECHNIQUE subheading (above), it incorrectly   states that the study was   performed without and with intravenous contrast agent administration.    Only with intravenous   contrast agent was used for this CTA exam.        Please note that portions of this note were completed with a voice   recognition program.     TIEN LICONA JR, MD         Electronically Signed and Approved By: TIEN LICONA JR, MD on 4/06/2023   at 0:47                      Signed: 04/06/23 0047 by Tien Licona MD    Narrative:      PROCEDURE: CT ANGIOGRAM HEAD W AI ANALYSIS OF LVO     COMPARISONS: 4/5/2023.     INDICATIONS: 52-YEAR-OLD MALE W/ H/O SLURRED SPEECH, TODAY; THE PATIENT WAS LAST KNOWN TO BE \"WELL\"   AT APPROXIMATELY 5:45 A.M. THIS MORNING; H/O STROKE; THE PT. TAKES 81 MG OF ASPIRIN, DAILY; NO   OTHER ANTICOAGULATION MEDICATIONS.     PROTOCOL:   Standard CTA imaging protocol performed.      RADIATION:   Total DLP:558 mGy*cm    Automated exposure control was utilized to minimize radiation dose.   CONTRAST: 70 mL Isovue 370 I.V.  RAPID: CTA imaging was analyzed using RAPID AI to enable computer assisted triage notification to   rapidly detect a large vessel occlusion (LVO) and shorten notification " time     TECHNIQUE: After obtaining the patient's consent, 808 CT/CTA images of the head were obtained   without and with non-ionic contrast, and multi-planar/3-D imaging were created and interpreted to   optimize visualization of vascular anatomy.       FINDINGS: No emergent large vessel occlusion is seen.  The left vertebral artery is dominant.    Calcified atherosclerotic plaque involves the bilateral distal internal carotid arteries with   suspected (at least) mild-to-moderate stenoses.  Otherwise, no hemodynamically significant stenoses   are suspected.  The A1 segment of the right anterior cerebral artery (DEJUAN) is hypoplastic, which is   a normal variant.  The anterior communicating artery is patent.  The posterior communicating artery   on the left is small in caliber and is thought to be patent.  The right-sided posterior   communicating artery is not well seen.  It may be hypoplastic or absent.  There is thought to be   mild atherosclerotic change within the posterior divisions of the bilateral P2 segments of the   posterior cerebral arteries (PCAs).  There may be atherosclerotic change more distally within the   bilateral PCAs.  Mild atherosclerotic change is suggested involving the bilateral proximal middle   cerebral arteries (MCAs) without hemodynamically significant stenoses.  The left vertebral artery   is dominant.  The bilateral intradural vertebral arteries and the basilar artery are patent without   significant stenoses.  No cerebral aneurysm is suggested.  Incidentally, a left-sided cardiac   implantable electronic device (CIED) is identified on the  topogram and may be a   contraindication to brain MRI examination.       Impression:       No emergent large vessel occlusion is identified.  Please see above comments for   further detail.          Please note that portions of this note were completed with a voice recognition program.     TIEN BLANCO JR, MD         Electronically Signed and  "Approved By: TIEN BLANCO JR, MD on 4/06/2023 at 0:40                     XR Chest 1 View [[id number]] Collected: 04/06/23 0001     Updated: 04/06/23 0004    Narrative:      PROCEDURE: XR CHEST 1 VW     COMPARISON: 6/15/2021.     INDICATIONS: ACUTE STROKE PROTOCOL.     FINDINGS:  A single AP (or PA) upright portable chest radiograph was performed.  No cardiac   enlargement is seen.  No pleural effusion or pneumothorax is identified.  An implantable loop   recorder (ILR) is thought to be in place, projected over the left subphrenic region, new since the   prior 6/15/2021 exam.  External artifacts obscure detail.  Chronic calcified granulomatous disease   involves the chest.  There is a left-sided cardiac implantable electronic device (CIED) in place,   seen previously, and unchanged in position since the prior study.  There is thought to be mild   degenerative change of the right acromioclavicular (AC) joint.  There is slight pulmonary   hypoinflation with mild bibasilar subsegmental atelectasis.  Acute infiltrate is thought to be less   likely.  Otherwise, no significant interval change is seen since the prior study (or studies).       Impression:        No acute infiltrate is appreciated.              Please note that portions of this note were completed with a voice recognition program.     TIEN BLANCO JR, MD         Electronically Signed and Approved By: TIEN BLANCO JR, MD on 4/06/2023 at 0:01                        CT CEREBRAL PERFUSION WITH & WITHOUT CONTRAST [[id number]] Collected: 04/05/23 2339     Updated: 04/05/23 2342    Narrative:      PROCEDURE: CT CEREBRAL PERFUSION W WO CONTRAST     COMPARISONS: 4/5/2023.     INDICATIONS: 52-YEAR-OLD MALE W/ H/O SLURRED SPEECH, TODAY; THE PATIENT WAS LAST KNOWN TO BE \"WELL\"   AT APPROXIMATELY 5:45 A.M. THIS MORNING; H/O STROKE; THE PT. TAKES 81 MG OF ASPIRIN, DAILY; NO   OTHER ANTICOAGULATION MEDICATIONS.     PROTOCOL:   Standard imaging protocol performed   " "   RADIATION:   DLP: 646.8 mGy*cm    Automated exposure control was utilized to minimize radiation dose.   CONTRAST: 80 cc Isovue 370 I.V.     TECHNIQUE:  CT perfusion (CTP) was performed, utilizing a total of 80 mL of nonionic intravenous   contrast agent (as indicated above) with an injection rate of 5 mL/s.  A total of 8 cm of brain   coverage was used for the CTP study (in two 4 cm \"slabs\").  The images were processed using [employer]   software (by [employer] [place], [place]).  The CTP maps, including the cerebral blood volume   (CBV), cerebral blood flow (CBF), mean transit time (MTT), time to maximum (Tmax), and time to peak   (TTP), were reviewed as well as the summary map(s).  The examination appears to be technically   adequate.  There arterial input function (AIF) and the venous output function (VOF) appear to be   appropriately placed.     FINDINGS:     TOTAL HYPOPERFUSION:  Using a threshold of Tmax greater than 6 seconds, there is no convincing   evidence for an area of acute cerebral hypoperfusion within the imaged brain.       CORE INFARCT:  Using the threshold of CBF less than 30 percent, there is no convincing CTP evidence   for an acute ischemic core (infarct).     PENUMBRA:  No penumbra (or tissue at risk, TAR) is appreciated.        IMPRESSION:    There is no convincing CTP (or CT perfusion) evidence for a large-vessel acute brain infarct.        Please note that portions of this note were completed with a voice recognition program.     TIEN BLANCO JR, MD         Electronically Signed and Approved By: TIEN BLANCO JR, MD on 4/05/2023 at 23:39                        CT Head Without Contrast Stroke Protocol [[id number]] Collected: 04/05/23 2337     Updated: 04/05/23 2341    Narrative:      PROCEDURE: CT HEAD WO CONTRAST STROKE PROTOCOL     COMPARISON: None.     INDICATIONS: 52-YEAR-OLD MALE W/ H/O SLURRED SPEECH, TODAY; THE PATIENT WAS LAST KNOWN TO BE \"WELL\"   AT APPROXIMATELY 5:45 A.M. " THIS MORNING; H/O STROKE; THE PT. TAKES 81 MG OF ASPIRIN, DAILY; NO   OTHER ANTICOAGULATION MEDICATIONS.     PROTOCOL:   Standard CT imaging protocol performed.      RADIATION:   Total DLP: 953.9 mGy*cm    MA and/or KV were/was adjusted to minimize radiation dose.       TECHNIQUE: After obtaining the patient's consent, 122 CT images were obtained without non-ionic   intravenous contrast material.      DISCUSSION:   A routine nonenhanced head CT was performed. No acute brain abnormality is identified. No acute   intracranial hemorrhage. No acute infarction. No acute skull fracture. No midline shift or acute   intracranial mass effect is seen.  Mild chronic small vessel ischemia/infarction is suspected.   There are arterial calcifications. The extra-axial spaces and the ventricular system are mildly   prominent for the patient's age suggesting some degree of central atrophy.  Mild mucosal thickening   involves the imaged paranasal sinuses.  No air-fluid interfaces are seen within the imaged   paranasal sinuses.  No significant acute findings are seen with regard to the imaged orbits or   middle ear clefts.  There is incidental asymmetric pneumatization of the right petrous apex, which   is a normal variant.  Degenerative changes involve the bilateral temporomandibular joints, greater   on the right.       Impression:         No acute brain abnormality is seen.  No acute intracranial hemorrhage.  No acute infarct.           Please note that portions of this note were completed with a voice recognition program.     TIEN BLANCO JR, MD         Electronically Signed and Approved By: TIEN BLANCO JR, MD on 4/05/2023 at 23:37                               insulin detemir, 1-200 Units, Subcutaneous, Nightly - Glucommander  insulin lispro, 1-200 Units, Subcutaneous, 4x Daily With Meals & Nightly         Assessment & Plan   Assessment / Plan     Assessment/Plan:   Slurring of speech  Imbalance  Concern for ischemic  stroke  Possible recrudescence from previous stroke  HFrEF  Hypertension  Hyperlipidemia  Nonischemic cardiomyopathy status post ICD  Type 1 diabetes mellitus    Plan  Admit to observation, telemetry  In-house neurology consult in the morning  Neurochecks per protocol  SLP eval for speech  Follow-up on the MRI of the brain, cardiac echo with bubble study  Ordered lipid panel, A1c  Aspirin 81 mg daily  Plavix 75 mg daily  Atorvastatin  PT OT consult  Heart healthy, consistent carb diet  Insulin as per weight-based Glucomander protocol  Resume other appropriate home medications once reconciled    DVT prophylaxis:  No DVT prophylaxis order currently exists.    CODE STATUS:    Level Of Support Discussed With: Patient  Code Status (Patient has no pulse and is not breathing): CPR (Attempt to Resuscitate)  Medical Interventions (Patient has pulse or is breathing): Full Support  Release to patient: Routine Release    Admission Status:  I believe this patient meets observation status.    Part of this note may be an electronic transcription/translation of spoken language to printed text using the Dragon Dictation System    Saman Chakraborty MD

## 2023-04-06 NOTE — ED PROVIDER NOTES
Time: 10:35 PM EDT  Date of encounter:  4/5/2023  Independent Historian/Clinical History and Information was obtained by:   Patient  Chief Complaint: Stroke    History is limited by: N/A    History of Present Illness:  Patient is a 52 y.o. year old male who presents to the emergency department for evaluation of slurred speech that started around 2 PM today.  Left-sided facial droop.  Last known well was 5 AM this morning.    Patient presents after slurred speech that started at 2 PM today. Patient stated that when he woke up this afternoon he felt unsteady and had a hard time concentrating and noticed slurred speech. Patient also notes unsteady gait. Patient states he has a decreased sensation change decreased in the right leg upon arrival to ED and currently. Patient thinks he is 50% better than this afternoon. Patient had a stroke in January. Today, since waking up, is when the patient noticed. Patient takes Aspirin daily and had a baby aspiring this morning. PMHx: CHF, CAD, HTN, Diabetes. Denies numbness, tingling, loss of strength, dragging leg, headache, trouble swallowing, blood in stools, vomiting, or vision changes.           Patient Care Team  Primary Care Provider: Kia Mayorga APRN    Past Medical History:     No Known Allergies  Past Medical History:   Diagnosis Date   • CHF (congestive heart failure)    • Coronary artery disease involving native heart without angina pectoris      Nonobstructive coronary artery disease involving diagonal branch per cardiac catheterization done on 01/21/2021   • Essential hypertension, benign    • Hypercholesterolemia    • ICD (implantable cardioverter-defibrillator), dual, in situ 09/22/2021   • Nicotine dependence    • Non-ischemic cardiomyopathy (CMS/HCC) 05/03/2021    Mildly dilated left ventricular cavity with severe global hypokinesis with estimated LV ejection fraction of       28%. On echo May 3, 2021   • Peyronie's disease    • Seizures     started 2021   •  Type 1 diabetes mellitus with vascular disease    • Type I diabetes mellitus, uncontrolled      Past Surgical History:   Procedure Laterality Date   • CARDIAC CATHETERIZATION     • CARDIAC ELECTROPHYSIOLOGY PROCEDURE N/A 06/15/2021    Procedure: ICD SC new;  Surgeon: Regulo Coyne MD;  Location: [facility] INVASIVE LOCATION;  Service: Cardiovascular;  Laterality: N/A;   • FRACTURE SURGERY Right     crushed heel injury with 14 fractures   • OTHER SURGICAL HISTORY  06/10/2013    plication for treatment of pyronie's disease   • VASECTOMY       Family History   Problem Relation Age of Onset   • Diabetes Mother    • Diabetes Father        Home Medications:  Prior to Admission medications    Medication Sig Start Date End Date Taking? Authorizing Provider   Acetaminophen (TYLENOL EXTRA STRENGTH PO) Take 500 mg by mouth Daily.    Provider, MD Mary   BABY ASPIRIN PO Take 81 mg by mouth Daily.    Provider, MD Mary   carvedilol (COREG) 25 MG tablet Take 1 tablet by mouth 2 (Two) Times a Day. 5/31/22   Tommy Nagel MD   Continuous Blood Gluc Sensor (FreeStyle Francis 2 Sensor) misc 1 each Every 14 (Fourteen) Days. 7/27/22   Jennifer Delgado APRN   Glucagon (Baqsimi Two Pack) 3 MG/DOSE powder 3 mg into the nostril(s) as directed by provider As Needed (Severe hypoglycemia). 3/3/23   Jennifer Delgado APRN   Insulin Aspart (NovoLOG) 100 UNIT/ML injection Up to 100 per day per insulin pump 7/27/22   Jennifer Delgado APRN   sacubitril-valsartan (ENTRESTO)  MG tablet Take 1 tablet by mouth 2 (Two) Times a Day. 5/31/22   Tommy Nagel MD   spironolactone (ALDACTONE) 25 MG tablet Take 1 tablet by mouth Daily. 1/25/22   Tommy Nagel MD        Social History:   Social History     Tobacco Use   • Smoking status: Every Day     Packs/day: 1.00     Types: Cigarettes   • Smokeless tobacco: Never   Vaping Use   • Vaping Use: Never used   Substance Use Topics   • Alcohol use: Yes      "Alcohol/week: 1.0 standard drink     Types: 1 Cans of beer per week     Comment: social drinker    • Drug use: Not Currently         Review of Systems:  Review of Systems   Constitutional: Negative for chills and fever.   HENT: Negative for congestion, rhinorrhea, sore throat and trouble swallowing.    Eyes: Negative for photophobia and visual disturbance.   Respiratory: Negative for apnea, cough, chest tightness and shortness of breath.    Cardiovascular: Negative for chest pain and palpitations.   Gastrointestinal: Negative for abdominal pain, blood in stool, diarrhea, nausea and vomiting.   Endocrine: Negative.    Genitourinary: Negative for difficulty urinating and dysuria.   Musculoskeletal: Negative for back pain, joint swelling and myalgias.   Skin: Negative for color change and wound.   Allergic/Immunologic: Negative.    Neurological: Negative for seizures, numbness and headaches.        +Slurred speech   Psychiatric/Behavioral: Negative.    All other systems reviewed and are negative.       Physical Exam:  /91   Pulse 67   Temp 97.8 °F (36.6 °C) (Oral)   Resp 17   Ht 170.2 cm (67\")   Wt 76.1 kg (167 lb 12.3 oz)   SpO2 100%   BMI 26.28 kg/m²     Physical Exam  Vitals and nursing note reviewed.   Constitutional:       General: He is awake.      Appearance: Normal appearance.   HENT:      Head: Normocephalic and atraumatic.      Nose: Nose normal.      Mouth/Throat:      Mouth: Mucous membranes are moist.   Eyes:      Extraocular Movements: Extraocular movements intact.      Pupils: Pupils are equal, round, and reactive to light.   Cardiovascular:      Rate and Rhythm: Normal rate and regular rhythm.      Heart sounds: Normal heart sounds.   Pulmonary:      Effort: Pulmonary effort is normal. No respiratory distress.      Breath sounds: Normal breath sounds. No wheezing, rhonchi or rales.   Abdominal:      General: Bowel sounds are normal.      Palpations: Abdomen is soft.      Tenderness: There " is no abdominal tenderness. There is no guarding or rebound.      Comments: No rigidity   Musculoskeletal:         General: No tenderness. Normal range of motion.      Cervical back: Normal range of motion and neck supple.   Skin:     General: Skin is warm and dry.      Coloration: Skin is not jaundiced.   Neurological:      General: No focal deficit present.      Mental Status: He is alert and oriented to person, place, and time. Mental status is at baseline.      Cranial Nerves: Dysarthria (mild) present.      Sensory: Sensory deficit (right face, RUE, and RLE) present.      Motor: Motor function is intact.      Coordination: Coordination is intact.      Comments: No motor deficits.    Psychiatric:         Attention and Perception: Attention and perception normal.         Mood and Affect: Mood and affect normal.         Speech: Speech normal.         Behavior: Behavior normal.         Judgment: Judgment normal.                  Procedures:  Procedures      Medical Decision Making:      Comorbidities that affect care:    Congestive Heart Failure, Coronary Artery Disease, Diabetes, Hypertension    External Notes reviewed:    See ED course      The following orders were placed and all results were independently analyzed by me:  Orders Placed This Encounter   Procedures   • CT Head Without Contrast Stroke Protocol   • CT Angiogram Head w AI Analysis of LVO   • CT Angiogram Neck   • CT CEREBRAL PERFUSION WITH & WITHOUT CONTRAST   • XR Chest 1 View   • [place] Draw   • Comprehensive Metabolic Panel   • Protime-INR   • aPTT   • Single High Sensitivity Troponin T   • Urinalysis With Microscopic If Indicated (No Culture) - Urine, Clean Catch   • CBC Auto Differential   • NPO Diet NPO Type: Strict NPO   • Initiate Department's Acute Stroke Process (Team D, Code 19, etc)   • Perform NIH Stroke Scale   • Measure Actual Weight   • Head of Bed 30 Degrees or Less   • Undress and Gown   • Cardiac Monitoring   • Continuous Pulse  Oximetry   • Vital Signs   • Neuro Checks   • Notify MD for SBP < 80 or > 200   • Notify Provider for SBP greater than 140 if hemorrhagic Stroke   • No Hypotonic Fluids   • Nursing Swallow Assessment   • Hospitalist (on-call MD unless specified)   • Oxygen Therapy- Nasal Cannula; 2 LPM; Titrate for SPO2: equal to or greater than, 94%   • POC Glucose Once   • POC Glucose Once   • ECG 12 Lead ED Triage Standing Order; Acute Stroke (Onset <12 hrs)   • Type & Screen   • ABO RH Specimen Verification   • Insert Large Bore Peripheral IV - Right AC Preferred   • Initiate Observation Status   • CBC & Differential   • Green Top (Gel)   • Lavender Top   • Gold Top - SST   • Light Blue Top       Medications Given in the Emergency Department:  Medications   sodium chloride 0.9 % flush 10 mL (has no administration in time range)   iopamidol (ISOVUE-370) 76 % injection 100 mL (100 mL Intravenous Given 4/5/23 2308)   iopamidol (ISOVUE-370) 76 % injection 100 mL (100 mL Intravenous Given 4/5/23 2311)   clopidogrel (PLAVIX) tablet 300 mg (300 mg Oral Given 4/5/23 2348)        ED Course:    ED Course as of 04/06/23 0049   Wed Apr 05, 2023 2234 --- PROVIDER IN TRIAGE NOTE ---    The patient was evaluated by Marek grewal in triage. Orders were placed and the patient is currently awaiting disposition.    [AJ]   2326 Lab review: Hemoglobin 2 months ago greater than 19 on 3 separate checks.  Today is 12.6. [RP]   Thu Apr 06, 2023   0047 Case discussed with Dr. Chakraborty for admission. [RP]      ED Course User Index  [AJ] Marek Rivera PA-C  [RP] Randy Mirza MD       Labs:    Lab Results (last 24 hours)     Procedure Component Value Units Date/Time    POC Glucose Once [[id number]]  (Abnormal) Collected: 04/05/23 2219    Specimen: Blood Updated: 04/05/23 2220     Glucose 200 mg/dL      Comment: Serial Number: [id number]:  [id number]       CBC & Differential [[id number]]  (Abnormal) Collected: 04/05/23 2233     Specimen: Blood Updated: 04/05/23 2242    Narrative:      The following orders were created for panel order CBC & Differential.  Procedure                               Abnormality         Status                     ---------                               -----------         ------                     CBC Auto Differential[[id number]]        Abnormal            Final result                 Please view results for these tests on the individual orders.    Comprehensive Metabolic Panel [[id number]]  (Abnormal) Collected: 04/05/23 2233    Specimen: Blood Updated: 04/05/23 2259     Glucose 166 mg/dL      BUN 15 mg/dL      Creatinine 0.87 mg/dL      Sodium 140 mmol/L      Potassium 3.8 mmol/L      Chloride 104 mmol/L      CO2 26.4 mmol/L      Calcium 9.3 mg/dL      Total Protein 6.8 g/dL      Albumin 4.3 g/dL      ALT (SGPT) 19 U/L      AST (SGOT) 14 U/L      Alkaline Phosphatase 103 U/L      Total Bilirubin 0.6 mg/dL      Globulin 2.5 gm/dL      A/G Ratio 1.7 g/dL      BUN/Creatinine Ratio 17.2     Anion Gap 9.6 mmol/L      eGFR 104.5 mL/min/1.73     Narrative:      GFR Normal >60  Chronic Kidney Disease <60  Kidney Failure <15      Protime-INR [[id number]]  (Normal) Collected: 04/05/23 2233    Specimen: Blood Updated: 04/05/23 2252     Protime 13.0 Seconds      INR 0.97    Narrative:      Suggested Therapeutic Ranges For Oral Anticoagulant Therapy:  Level of Therapy                      INR Target Range  Standard Dose                            2.0-3.0  High Dose                                2.5-3.5  Patients not receiving anticoagulant  Therapy Normal Range                     0.86-1.15    aPTT [[id number]]  (Normal) Collected: 04/05/23 2233    Specimen: Blood Updated: 04/05/23 2252     PTT 28.3 seconds     Single High Sensitivity Troponin T [[id number]]  (Normal) Collected: 04/05/23 2233    Specimen: Blood Updated: 04/05/23 2302     HS Troponin T 13 ng/L     Narrative:      High Sensitive Troponin T Reference  Range:  <10.0 ng/L- Negative Female for AMI  <15.0 ng/L- Negative Male for AMI  >=10 - Abnormal Female indicating possible myocardial injury.  >=15 - Abnormal Male indicating possible myocardial injury.   Clinicians would have to utilize clinical acumen, EKG, Troponin, and serial changes to determine if it is an Acute Myocardial Infarction or myocardial injury due to an underlying chronic condition.         CBC Auto Differential [[id number]]  (Abnormal) Collected: 04/05/23 2233    Specimen: Blood Updated: 04/05/23 2242     WBC 10.36 10*3/mm3      RBC 4.01 10*6/mm3      Hemoglobin 12.6 g/dL      Hematocrit 35.7 %      MCV 89.0 fL      MCH 31.4 pg      MCHC 35.3 g/dL      RDW 13.1 %      RDW-SD 42.5 fl      MPV 9.5 fL      Platelets 297 10*3/mm3      Neutrophil % 54.0 %      Lymphocyte % 36.0 %      Monocyte % 6.6 %      Eosinophil % 2.7 %      Basophil % 0.4 %      Immature Grans % 0.3 %      Neutrophils, Absolute 5.60 10*3/mm3      Lymphocytes, Absolute 3.73 10*3/mm3      Monocytes, Absolute 0.68 10*3/mm3      Eosinophils, Absolute 0.28 10*3/mm3      Basophils, Absolute 0.04 10*3/mm3      Immature Grans, Absolute 0.03 10*3/mm3      nRBC 0.0 /100 WBC     Urinalysis With Microscopic If Indicated (No Culture) - Urine, Clean Catch [[id number]]  (Normal) Collected: 04/05/23 2325    Specimen: Urine, Clean Catch Updated: 04/05/23 2341     Color, UA Yellow     Appearance, UA Clear     pH, UA 7.0     Specific Gravity, UA <=1.005     Glucose, UA Negative     Ketones, UA Negative     Bilirubin, UA Negative     Blood, UA Negative     Protein, UA Negative     Leuk Esterase, UA Negative     Nitrite, UA Negative     Urobilinogen, UA 0.2 E.U./dL    Narrative:      Urine microscopic not indicated.           Imaging:    XR Chest 1 View    Result Date: 4/6/2023  PROCEDURE: XR CHEST 1 VW  COMPARISON: 6/15/2021.  INDICATIONS: ACUTE STROKE PROTOCOL.  FINDINGS:  A single AP (or PA) upright portable chest radiograph was performed.  No  "cardiac enlargement is seen.  No pleural effusion or pneumothorax is identified.  An implantable loop recorder (ILR) is thought to be in place, projected over the left subphrenic region, new since the prior 6/15/2021 exam.  External artifacts obscure detail.  Chronic calcified granulomatous disease involves the chest.  There is a left-sided cardiac implantable electronic device (CIED) in place, seen previously, and unchanged in position since the prior study.  There is thought to be mild degenerative change of the right acromioclavicular (AC) joint.  There is slight pulmonary hypoinflation with mild bibasilar subsegmental atelectasis.  Acute infiltrate is thought to be less likely.  Otherwise, no significant interval change is seen since the prior study (or studies).        No acute infiltrate is appreciated.     Please note that portions of this note were completed with a voice recognition program.  TIEN BLANCO JR, MD       Electronically Signed and Approved By: TIEN BLANCO JR, MD on 4/06/2023 at 0:01              CT Head Without Contrast Stroke Protocol    Result Date: 4/5/2023  PROCEDURE: CT HEAD WO CONTRAST STROKE PROTOCOL  COMPARISON: None.  INDICATIONS: 52-YEAR-OLD MALE W/ H/O SLURRED SPEECH, TODAY; THE PATIENT WAS LAST KNOWN TO BE \"WELL\" AT APPROXIMATELY 5:45 A.M. THIS MORNING; H/O STROKE; THE PT. TAKES 81 MG OF ASPIRIN, DAILY; NO OTHER ANTICOAGULATION MEDICATIONS.  PROTOCOL:   Standard CT imaging protocol performed.    RADIATION:   Total DLP: 953.9 mGy*cm   MA and/or KV were/was adjusted to minimize radiation dose.    TECHNIQUE: After obtaining the patient's consent, 122 CT images were obtained without non-ionic intravenous contrast material.  DISCUSSION:  A routine nonenhanced head CT was performed. No acute brain abnormality is identified. No acute intracranial hemorrhage. No acute infarction. No acute skull fracture. No midline shift or acute intracranial mass effect is seen.  Mild chronic small " "vessel ischemia/infarction is suspected. There are arterial calcifications. The extra-axial spaces and the ventricular system are mildly prominent for the patient's age suggesting some degree of central atrophy.  Mild mucosal thickening involves the imaged paranasal sinuses.  No air-fluid interfaces are seen within the imaged paranasal sinuses.  No significant acute findings are seen with regard to the imaged orbits or middle ear clefts.  There is incidental asymmetric pneumatization of the right petrous apex, which is a normal variant.  Degenerative changes involve the bilateral temporomandibular joints, greater on the right.        No acute brain abnormality is seen.  No acute intracranial hemorrhage.  No acute infarct.    Please note that portions of this note were completed with a voice recognition program.  TIEN BLANCO JR, MD       Electronically Signed and Approved By: TIEN BLANOC JR, MD on 4/05/2023 at 23:37              CT Angiogram Head w AI Analysis of LVO    Result Date: 4/6/2023  PROCEDURE: CT ANGIOGRAM HEAD W AI ANALYSIS OF LVO  COMPARISONS: 4/5/2023.  INDICATIONS: 52-YEAR-OLD MALE W/ H/O SLURRED SPEECH, TODAY; THE PATIENT WAS LAST KNOWN TO BE \"WELL\" AT APPROXIMATELY 5:45 A.M. THIS MORNING; H/O STROKE; THE PT. TAKES 81 MG OF ASPIRIN, DAILY; NO OTHER ANTICOAGULATION MEDICATIONS.  PROTOCOL:   Standard CTA imaging protocol performed.    RADIATION:   Total DLP:558 mGy*cm   Automated exposure control was utilized to minimize radiation dose. CONTRAST: 70 mL Isovue 370 I.V. RAPID: CTA imaging was analyzed using RAPID AI to enable computer assisted triage notification to rapidly detect a large vessel occlusion (LVO) and shorten notification time  TECHNIQUE: After obtaining the patient's consent, 808 CT/CTA images of the head were obtained without and with non-ionic contrast, and multi-planar/3-D imaging were created and interpreted to optimize visualization of vascular anatomy.   FINDINGS: No emergent " "large vessel occlusion is seen.  The left vertebral artery is dominant.  Calcified atherosclerotic plaque involves the bilateral distal internal carotid arteries with suspected (at least) mild-to-moderate stenoses.  Otherwise, no hemodynamically significant stenoses are suspected.  The A1 segment of the right anterior cerebral artery (DEJUAN) is hypoplastic, which is a normal variant.  The anterior communicating artery is patent.  The posterior communicating artery on the left is small in caliber and is thought to be patent.  The right-sided posterior communicating artery is not well seen.  It may be hypoplastic or absent.  There is thought to be mild atherosclerotic change within the posterior divisions of the bilateral P2 segments of the posterior cerebral arteries (PCAs).  There may be atherosclerotic change more distally within the bilateral PCAs.  Mild atherosclerotic change is suggested involving the bilateral proximal middle cerebral arteries (MCAs) without hemodynamically significant stenoses.  The left vertebral artery is dominant.  The bilateral intradural vertebral arteries and the basilar artery are patent without significant stenoses.  No cerebral aneurysm is suggested.  Incidentally, a left-sided cardiac implantable electronic device (CIED) is identified on the  topogram and may be a contraindication to brain MRI examination.       No emergent large vessel occlusion is identified.  Please see above comments for further detail.    Please note that portions of this note were completed with a voice recognition program.  TIEN BLANCO JR, MD       Electronically Signed and Approved By: TIEN BLANCO JR, MD on 4/06/2023 at 0:40             CT CEREBRAL PERFUSION WITH & WITHOUT CONTRAST    Result Date: 4/5/2023  PROCEDURE: CT CEREBRAL PERFUSION W WO CONTRAST  COMPARISONS: 4/5/2023.  INDICATIONS: 52-YEAR-OLD MALE W/ H/O SLURRED SPEECH, TODAY; THE PATIENT WAS LAST KNOWN TO BE \"WELL\" AT APPROXIMATELY 5:45 " "A.M. THIS MORNING; H/O STROKE; THE PT. TAKES 81 MG OF ASPIRIN, DAILY; NO OTHER ANTICOAGULATION MEDICATIONS.  PROTOCOL:   Standard imaging protocol performed    RADIATION:   DLP: 646.8 mGy*cm   Automated exposure control was utilized to minimize radiation dose. CONTRAST: 80 cc Isovue 370 I.V.  TECHNIQUE:  CT perfusion (CTP) was performed, utilizing a total of 80 mL of nonionic intravenous contrast agent (as indicated above) with an injection rate of 5 mL/s.  A total of 8 cm of brain coverage was used for the CTP study (in two 4 cm \"slabs\").  The images were processed using [employer] software (by [employer] [place], [place]).  The CTP maps, including the cerebral blood volume (CBV), cerebral blood flow (CBF), mean transit time (MTT), time to maximum (Tmax), and time to peak (TTP), were reviewed as well as the summary map(s).  The examination appears to be technically adequate.  There arterial input function (AIF) and the venous output function (VOF) appear to be appropriately placed.  FINDINGS:  TOTAL HYPOPERFUSION:  Using a threshold of Tmax greater than 6 seconds, there is no convincing evidence for an area of acute cerebral hypoperfusion within the imaged brain.   CORE INFARCT:  Using the threshold of CBF less than 30 percent, there is no convincing CTP evidence for an acute ischemic core (infarct).  PENUMBRA:  No penumbra (or tissue at risk, TAR) is appreciated.    IMPRESSION:  There is no convincing CTP (or CT perfusion) evidence for a large-vessel acute brain infarct.   Please note that portions of this note were completed with a voice recognition program.  TIEN BLANCO JR, MD       Electronically Signed and Approved By: TIEN BLANCO JR, MD on 4/05/2023 at 23:39                  Differential Diagnosis and Discussion:    Altered Mental Status: Based on the patient's signs and symptoms, differential diagnosis includes but is not limited to meningitis, stroke, sepsis, subarachnoid hemorrhage, intracranial " bleeding, encephalitis, and metabolic encephalopathy.    All labs were reviewed and interpreted by me.  All X-rays were independently reviewed by me.  EKG was interpreted by me.  CT scan radiology interpretation was reviewed by me.    [facility]         Patient Care Considerations:    MRI: I considered ordering an MRI however MRI is not in-house and will have to be called in.  Based on his timeframe and NIH score this test can be done tomorrow morning.      Consultants/Shared Management Plan:    Hospitalist: I have discussed the case with Dr. Chakraborty who agrees to accept the patient for admission.    Social Determinants of Health:    Patient is independent, reliable, and has access to care.       Disposition and Care Coordination:    Admit:   Through independent evaluation of the patient's history, physical, and imperical data, the patient meets criteria for observation/admission to the hospital.    I have explained the patient´s condition, diagnoses and treatment plan based on the information available to me at this time. I have answered questions and addressed any concerns. The patient has a good  understanding of the patient´s diagnosis, condition, and treatment plan as can be expected at this point. The vital signs have been stable. The patient´s condition is stable and appropriate for discharge from the emergency department.      The patient will pursue further outpatient evaluation with the primary care physician or other designated or consulting physician as outlined in the discharge instructions. They are agreeable to this plan of care and follow-up instructions have been explained in detail. The patient has received these instructions in written format and have expressed an understanding of the discharge instructions. The patient is aware that any significant change in condition or worsening of symptoms should prompt an immediate return to this or the closest emergency department or call to 911.    Final diagnoses:    Acute CVA (cerebrovascular accident)        ED Disposition     ED Disposition   Decision to Admit    Condition   --    Comment   Level of Care: Telemetry [5]   Diagnosis: Acute CVA (cerebrovascular accident) [[id number]]   Bed Request Comments: PCU               This medical record created using voice recognition software.    Documentation assistance provided by Peri Jha acting as scribe for Randy Mirza MD. Information recorded by the scribe was done at my direction and has been verified and validated by me.          Peri Jha  04/05/23 0312       Randy Mirza MD  04/06/23 0049

## 2023-04-06 NOTE — CONSULTS
[facility]   Neurology Consult Note    Patient Name: Demetrio Mariano  : 1970  MRN: [id number]  Primary Care Physician:  Kia Mayorga APRN  Referring Physician: No Known Provider  Date of admission: 2023    Subjective   Subjective     Reason for Consult/ Chief Complaint: Acute stroke    HPI:  Demetrio Mariano is a 52 y.o. male is evaluated for slurring of speech.  He states that he had a stroke in January was admitted to [facility] in [place].  We reviewed the MRI of the brain and had a right pontine infarction at that time.  He states that yesterday he woke up with slurred speech.  MRI of the brain shows a focal subacute infarct within the left basal ganglia.  CTA of the head and neck was unremarkable.  He was taking aspirin prior to this admission.  He states that he has an insulin pump and has a history of diabetes for over 20 years.  His blood pressure is also elevated.  He is hemoglobin A1c to is 10.42 months ago 11.  Blood pressure on admission was 195/118.  Blood pressure has since normalized after permissive hypertension.  He states that he is working.  He is independent with activities of daily living.  He was at work when this happened yesterday.  He was taking aspirin and Plavix for 21 days after his discharge from the pontine stroke in January.      Personal History     Past Medical History:   Diagnosis Date   • CHF (congestive heart failure)    • Coronary artery disease involving native heart without angina pectoris      Nonobstructive coronary artery disease involving diagonal branch per cardiac catheterization done on 2021   • Essential hypertension, benign    • Hypercholesterolemia    • ICD (implantable cardioverter-defibrillator), dual, in situ 2021   • Nicotine dependence    • Non-ischemic cardiomyopathy (CMS/HCC) 2021    Mildly dilated left ventricular cavity with severe global hypokinesis with estimated LV ejection fraction of       28%. On echo May  3, 2021   • Peyronie's disease    • Seizures     started 2021   • Type 1 diabetes mellitus with vascular disease    • Type I diabetes mellitus, uncontrolled        Past Surgical History:   Procedure Laterality Date   • CARDIAC CATHETERIZATION     • CARDIAC ELECTROPHYSIOLOGY PROCEDURE N/A 06/15/2021    Procedure: ICD SC new;  Surgeon: Regulo Coyne MD;  Location: [facility] INVASIVE LOCATION;  Service: Cardiovascular;  Laterality: N/A;   • FRACTURE SURGERY Right     crushed heel injury with 14 fractures   • OTHER SURGICAL HISTORY  06/10/2013    plication for treatment of pyronie's disease   • VASECTOMY         Family History: family history includes Diabetes in his father and mother. Otherwise pertinent FHx was reviewed and not pertinent to current issue.    Social History:  reports that he has been smoking cigarettes. He has been smoking an average of 1 pack per day. He has never used smokeless tobacco. He reports current alcohol use of about 1.0 standard drink per week. He reports that he does not currently use drugs.    Home Medications:  Aspirin, FreeStyle Francis 2 Sensor, Glucagon, Insulin Aspart, atorvastatin, carvedilol, furosemide, insulin, sacubitril-valsartan, and spironolactone    Allergies:  No Known Allergies    Objective    Objective     Vitals:   Temp:  [97.3 °F (36.3 °C)-98 °F (36.7 °C)] 97.7 °F (36.5 °C)  Heart Rate:  [65-86] 86  Resp:  [16-20] 20  BP: (118-195)/(61-91) 136/74    Physical Exam: He is alert, fluent, phasic, follows commands well.  Visual fields full, EOMs full directions gaze, facial sensation is decreased on the left face, facial strength is mild right facial droop and flattening of the right nasolabial fold.  There is no weakness.  Soft palate elevation and tongue are normal.  There is no drift.  There is no weakness of the upper or lower extremities.  He is able to ambulate without any unsteadiness.  Sensation is decreased to pinprick in the right hand.  Heart is regular rhythm  normal in rate.      Result Review    Result Review:  I have personally reviewed the results from the time of this admission to 4/6/2023 17:57 EDT and agree with these findings:  [x]  Laboratory  []  Microbiology  []  Radiology  [x]  EKG/Telemetry   [x]  Cardiology/Vascular   []  Pathology  [x]  Old records  []  Other:      Assessment & Plan   Assessment / Plan   Active Hospital Problems:  Active Hospital Problems    Diagnosis    • **Acute CVA (cerebrovascular accident)          Plan: I discussed with him that he has small vessel disease from hypertension and diabetes.  I discussed with him that he needs to get his hemoglobin to a lower level and needs to be followed up aggressively.  He also needs to have his hypertension better manage to normotensive levels of 130/80.  I discussed with him that he needs to stop smoking.  He can follow-up in my clinic and I will fill out an FMLA form for him.  He is requesting to be discharged tomorrow.  He will be discharged on aspirin 81 mg and Plavix 75 mg for 3 weeks and then take Plavix 5 mg daily thereafter.  Other medications include atorvastatin 80 mg and antihypertensive medications.    Total time spent with the patient and coordinating patient care was 55 minutes.    electronically signed by Tommy Stoner MD, 04/06/23, 5:57 PM EDT.

## 2023-04-06 NOTE — NURSING NOTE
Pt bs 252, order dose of insulin 6 units. Pt requested that he only take 1 unit of insulin due to having an insulin pump in place.

## 2023-04-07 ENCOUNTER — READMISSION MANAGEMENT (OUTPATIENT)
Dept: CALL CENTER | Facility: HOSPITAL | Age: 53
End: 2023-04-07
Payer: COMMERCIAL

## 2023-04-07 ENCOUNTER — TELEPHONE (OUTPATIENT)
Dept: NEUROLOGY | Facility: CLINIC | Age: 53
End: 2023-04-07

## 2023-04-07 VITALS
SYSTOLIC BLOOD PRESSURE: 106 MMHG | WEIGHT: 168.43 LBS | BODY MASS INDEX: 26.44 KG/M2 | HEIGHT: 67 IN | DIASTOLIC BLOOD PRESSURE: 61 MMHG | OXYGEN SATURATION: 98 % | RESPIRATION RATE: 18 BRPM | TEMPERATURE: 97.7 F | HEART RATE: 72 BPM

## 2023-04-07 LAB
CHOLEST SERPL-MCNC: 189 MG/DL (ref 0–200)
GLUCOSE BLDC GLUCOMTR-MCNC: 213 MG/DL (ref 70–99)
GLUCOSE BLDC GLUCOMTR-MCNC: 72 MG/DL (ref 70–99)
HDLC SERPL-MCNC: 41 MG/DL (ref 40–60)
LDLC SERPL CALC-MCNC: 131 MG/DL (ref 0–100)
LDLC/HDLC SERPL: 3.17 {RATIO}
TRIGL SERPL-MCNC: 90 MG/DL (ref 0–150)
VLDLC SERPL-MCNC: 17 MG/DL (ref 5–40)
WHOLE BLOOD HOLD SPECIMEN: NORMAL

## 2023-04-07 PROCEDURE — 82962 GLUCOSE BLOOD TEST: CPT

## 2023-04-07 PROCEDURE — 80061 LIPID PANEL: CPT | Performed by: INTERNAL MEDICINE

## 2023-04-07 PROCEDURE — 97161 PT EVAL LOW COMPLEX 20 MIN: CPT

## 2023-04-07 PROCEDURE — 99239 HOSP IP/OBS DSCHRG MGMT >30: CPT | Performed by: INTERNAL MEDICINE

## 2023-04-07 PROCEDURE — G0378 HOSPITAL OBSERVATION PER HR: HCPCS

## 2023-04-07 PROCEDURE — 92522 EVALUATE SPEECH PRODUCTION: CPT

## 2023-04-07 RX ORDER — CLOPIDOGREL BISULFATE 75 MG/1
75 TABLET ORAL DAILY
Qty: 30 TABLET | Refills: 2 | Status: SHIPPED | OUTPATIENT
Start: 2023-04-08

## 2023-04-07 RX ORDER — ASPIRIN 81 MG/1
81 TABLET, CHEWABLE ORAL DAILY
Qty: 21 TABLET | Refills: 0 | Status: SHIPPED | OUTPATIENT
Start: 2023-04-08 | End: 2023-04-29

## 2023-04-07 RX ORDER — ATORVASTATIN CALCIUM 80 MG/1
80 TABLET, FILM COATED ORAL NIGHTLY
Qty: 30 TABLET | Refills: 0 | Status: SHIPPED | OUTPATIENT
Start: 2023-04-07 | End: 2023-05-07

## 2023-04-07 RX ADMIN — ASPIRIN 81 MG 81 MG: 81 TABLET ORAL at 08:23

## 2023-04-07 RX ADMIN — CLOPIDOGREL BISULFATE 75 MG: 75 TABLET ORAL at 08:24

## 2023-04-07 RX ADMIN — Medication 10 ML: at 08:24

## 2023-04-07 NOTE — PLAN OF CARE
Goal Outcome Evaluation:  Plan of Care Reviewed With: patient            ASSESSMENT/ PLAN OF CARE:  No direct speech therapy is recommended at this time. Recommend rereferral should patient demonstrate change in status.  Note plan discharge home this date.  Patient given and instructed on home exercise program.  Instructed to pursue outpatient speech pathology services if condition persists.

## 2023-04-07 NOTE — THERAPY EVALUATION
Acute Care - Speech Language Pathology Initial Evaluation   He     Patient Name: Demetrio Mariano  : 1970  MRN: [id number]  Today's Date: 2023               Admit Date: 2023     Visit Dx:    ICD-10-CM ICD-9-CM   1. Acute CVA (cerebrovascular accident)  I63.9 434.91   2. 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   • Nicotine dependence   • Acute CVA (cerebrovascular accident)     Past Medical History:   Diagnosis Date   • CHF (congestive heart failure)    • Coronary artery disease involving native heart without angina pectoris      Nonobstructive coronary artery disease involving diagonal branch per cardiac catheterization done on 2021   • Essential hypertension, benign    • Hypercholesterolemia    • ICD (implantable cardioverter-defibrillator), dual, in situ 2021   • Nicotine dependence    • Non-ischemic cardiomyopathy (CMS/HCC) 2021    Mildly dilated left ventricular cavity with severe global hypokinesis with estimated LV ejection fraction of       28%. 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    • Type I diabetes mellitus, uncontrolled      Past Surgical History:   Procedure Laterality Date   • CARDIAC CATHETERIZATION     • CARDIAC ELECTROPHYSIOLOGY PROCEDURE N/A 06/15/2021    Procedure: ICD SC new;  Surgeon: Regulo Coyne MD;  Location: [facility] INVASIVE LOCATION;  Service: Cardiovascular;  Laterality: N/A;   • FRACTURE SURGERY Right     crushed heel injury with 14 fractures   • OTHER SURGICAL HISTORY  06/10/2013     plication for treatment of pyronie's disease   • VASECTOMY         INPATIENT SPEECH EVALUATION        DATE OF SERVICE: 4/7/23    REHAB DIAGNOSIS: Dysarthria    PAIN SCALE: None indicated.    PRECAUTIONS/CONTRAINDICATIONS: Standard    SUSPECTED ABUSE/NEGLECT/EXPLOITATION: None indicated.    SOCIAL/PSYCHOLOGICAL NEEDS/BARRIERS: None indicated.    PAST SOCIAL HISTORY: 52-year-old male lives at home with his wife    PRIOR FUNCTION: Independent    PATIENT GOALS/EXPECTATIONS: Return home, return to work    HISTORY: 52-year-old male with the above diagnosis referred for speech therapy evaluation due to stroke.  Per report patient had mild residual dysarthria from previous stroke with speech further affected at this time.  No previous speech pathology services reported this incident.    CURRENT DIET LEVEL: Regular    OBJECTIVE:  BEHAVIORAL OBSERVATIONS: Alert and cooperative    COGNITION/SAFETY AWARENESS: Appears intact    ORAL MOTOR EXAM: Mild decreased facial strength noted to the right 4 -/5.    TEST ADMINISTERED: Portions of the Dysarthria Profile  Vocal quality appears good with maximum phonation time greater than 10 seconds.  Patient demonstrated ability to crescendo/decrescendo.  Diadochokinetic rates were within normal limits for single and double phonemes, in the good range for multiple phonemes.  Intelligibility appears good at the single word level.   Mild decrease of intelligibility is noted in conversational tasks with decreased coordination and imprecise articulation.    FUNCTIONAL ASSESSMENT INSTRUMENT: Patient currently scored a level 6 of 7 on Functional Communication Measures for motor speech indicating a 1-19% limitation in function.    ASSESSMENT/ PLAN OF CARE:  No direct speech therapy is recommended at this time. Recommend rereferral should patient demonstrate change in status.  Note plan discharge home this date.  Patient given and instructed on home exercise program.  Instructed to pursue outpatient  speech pathology services if condition persists.      REHAB POTENTIAL:  Pt has good rehab potential.      Pt/responsible party agrees with plan of care and has been informed of all alternatives, risks and benefits.                 Anticipated Discharge Disposition (SLP): home (04/07/23 0935)                                                 EDUCATION  The patient has been educated in the following areas:     Communication Impairment.                      Time Calculation:      Time Calculation- SLP     Row Name 04/07/23 0935             Time Calculation- SLP    SLP Start Time 0830  -TB      SLP Stop Time 0930  -TB      SLP Time Calculation (min) 60 min  -TB      SLP Received On 04/07/23  -TB         Untimed Charges    SLP Eval/Re-eval  ST Eval Speech Sound Production - [id number]  -TB      [id number] Eval Speech Sound Production Minutes 45  -TB         Total Minutes    Untimed Charges Total Minutes 45  -TB       Total Minutes 45  -TB            User Key  (r) = Recorded By, (t) = Taken By, (c) = Cosigned By    Initials Name Provider Type    TB Lynda Webster SLP Speech and Language Pathologist                Therapy Charges for Today     Code Description Service Date Service Provider Modifiers Qty    [id number]  ST EVAL SPEECH SOUND PRODUCTION 3 4/7/2023 Lynda Webster SLP GN 1                     ISAURA Trevino  4/7/2023

## 2023-04-07 NOTE — TELEPHONE ENCOUNTER
HOSPITAL FOLLOW-UP REQUEST    New or established patient? [x] New  [] Established    Date of admission: 4/5/2023    Date of discharge: 4/7/2023    Length of stay (If applicable): 2 DAYS    Facility discharged from: [facility]    Diagnosis/Symptoms: ACUTE CVA    Suggested follow-up timeframe: 2 WEEKS    Specialty Only: Did you see a [facility] health provider?    [x] Yes  [] No    If so, who? DR. BRIGGS    Additional notes: PER HOSPITAL PT TO F/U IN 2 WEEKS.    H UNABLE TO SCHEDULE WITHIN 2 WEEK F/U TIMEFRAME. SENDING ENCOUNTER TO BE ADVISED ON SCHEDULING.    PLEASE REVIEW AND ADVISE.

## 2023-04-07 NOTE — DISCHARGE INSTR - APPOINTMENTS
Call PCP to make follow up appointment, Office closed this week due to spring break  's office will call to schedule appointment

## 2023-04-07 NOTE — PLAN OF CARE
Goal Outcome Evaluation:  Plan of Care Reviewed With: patient           Outcome Evaluation: Pt did not demonstrate any safety or mobility deficits during the evaluation.  Recommend return home independently

## 2023-04-07 NOTE — DISCHARGE SUMMARY
[facility]         HOSPITALIST  DISCHARGE SUMMARY    Patient Name: Demetrio Mariano  : 1970  MRN: [id number]    Date of Admission: 2023  Date of Discharge:  2023  Primary Care Physician: Kia Mayorga APRN    Consults     Date and Time Order Name Status Description    2023 11:20 AM Inpatient Neurology Consult Stroke Completed     2023 10:56 AM Inpatient Neurology Consult Stroke      2023 12:27 AM Hospitalist (on-call MD unless specified)            Active and Resolved Hospital Problems:  Focal subacute infarct within the left basal ganglia  HFrEF  Hypertension  Hyperlipidemia  Nonischemic cardiomyopathy status post ICD  Type 1 diabetes mellitus, poorly controlled    Hospital Course     Hospital Course:  Demetrio Mariano is a 52 y.o. male with a past medical history of CHF, coronary artery disease, hypertension, diabetes, recent ischemic stroke in 2023 with very subtle dysarthria from the prior stroke presented to the ED for evaluation of slurring of the speech.    Patient woke from sleep around 2 PM on 2023 with slurring of speech and balance and numbness in left face and right leg.  Patient CTA of the head and neck unremarkable.  MRI of the brain shows focal subacute infarct within the left basal ganglia.  Patient was reportedly taken aspirin prior to this admission.  Patient is on insulin pump for his diabetes management.  A1c 10.42.  Patient was seen by neurology while inpatient.  Patient is discharging on aspirin 81 mg and Plavix 75 mg for 3 weeks then will take Plavix 75 mg daily thereafter.  Patient's atorvastatin also increased to 80 mg.  Patient continues to smoke around 1/4 pack/day.  Discussing with diabetic educator, patient has poor compliance with insulin pump.  Patient does not interact with his pump, not entering carbohydrates or glucose readings.  Patient only using insulin pump for basal supply.  Patient provided significant  education by neurology, hospitalist and diabetic educator.  Patient seen on date of discharge, clinically and hemodynamically stable.  Patient provided concerning signs and symptoms prompting immediate medical attention, patient understanding and agreeable    DISCHARGE Follow Up Recommendations for labs and diagnostics:   Follow-up with primary care physician as soon as possible  Follow-up with neurology soon as possible  Follow-up with diabetic educator      Day of Discharge     Vital Signs:  Temp:  [97.7 °F (36.5 °C)-98.2 °F (36.8 °C)] 97.7 °F (36.5 °C)  Heart Rate:  [70-86] 72  Resp:  [16-20] 18  BP: (106-149)/(61-74) 106/61  Physical Exam:               Constitutional: Awake, alert, no acute distress              Eyes: Pupils equal, sclerae anicteric, no conjunctival injection              HENT: NCAT, mucous membranes moist              Neck: Supple, no thyromegaly, no lymphadenopathy, trachea midline              Respiratory: Clear to auscultation bilaterally, nonlabored respirations               Cardiovascular: RRR, no murmurs, rubs, or gallops, palpable pedal pulses bilaterally              Gastrointestinal: Positive bowel sounds, soft, nontender, nondistended              Musculoskeletal: No bilateral ankle edema, no clubbing or cyanosis to extremities              Psychiatric: Appropriate affect, cooperative              Neurologic: Oriented x 3, strength symmetric 5/5 bilateral upper and lower extremities, cranial nerves II through XII intact, Normal finger-to-nose test, no slurring of speech              Skin: No rashes       Discharge Details        Discharge Medications      New Medications      Instructions Start Date   clopidogrel 75 MG tablet  Commonly known as: PLAVIX   75 mg, Oral, Daily   Start Date: April 8, 2023        Changes to Medications      Instructions Start Date   aspirin 81 MG chewable tablet  What changed: medication strength   81 mg, Oral, Daily   Start Date: April 8, 2023      atorvastatin 80 MG tablet  Commonly known as: LIPITOR  What changed:   medication strength  how much to take  when to take this   80 mg, Oral, Nightly         Continue These Medications      Instructions Start Date   Baqsimi Two Pack 3 MG/DOSE powder  Generic drug: Glucagon   3 mg, Nasal, As Needed      carvedilol 25 MG tablet  Commonly known as: COREG   25 mg, Oral, 2 Times Daily      FreeStyle Francis 2 Sensor misc   1 each, Does not apply, Every 14 Days      furosemide 20 MG tablet  Commonly known as: LASIX   20 mg, Oral, Daily      insulin  patient supplied pump   Subcutaneous, Continuous, Type of Insulin: NOVOLOG Basal Dose:  12 AM to 6 AM - 2.15u/hr 6 AM to 3 PM - 1.9u/hr 3 PM to 12 AM - 2.115u/hr  Bolus Dose: CORRECTION RATIO 1:40 Prescriber:KATHERINE DELGADO Phone number: Next refill: 4/10/23 MEDTRONIC PUMP      Insulin Aspart 100 UNIT/ML injection  Commonly known as: NovoLOG   Up to 100 per day per insulin pump      sacubitril-valsartan  MG tablet  Commonly known as: ENTRESTO   1 tablet, Oral, 2 Times Daily      spironolactone 25 MG tablet  Commonly known as: ALDACTONE   25 mg, Oral, Daily             No Known Allergies    Discharge Disposition:  Home or Self Care    Diet:  Hospital:  Diet Order   Procedures   • Diet: Cardiac Diets, Diabetic Diets; Healthy Heart (2-3 Na+); Consistent Carbohydrate; Texture: Regular Texture (IDDSI 7); Fluid Consistency: Thin (IDDSI 0)       Discharge Activity:   Activity Instructions     Activity as Tolerated            CODE STATUS:  Code Status and Medical Interventions:   Ordered at: 04/06/23 0155     Level Of Support Discussed With:    Patient     Code Status (Patient has no pulse and is not breathing):    CPR (Attempt to Resuscitate)     Medical Interventions (Patient has pulse or is breathing):    Full Support     Release to patient:    Routine Release         Future Appointments   Date Time Provider Department Center   6/12/2023  1:40 PM Katherine Delgado APRN [facility]  [facility]       Additional Instructions for the Follow-ups that You Need to Schedule     Discharge Follow-up with PCP   As directed       Currently Documented PCP:    Kia Mayorga APRN    PCP Phone Number:    [phone]     Follow Up Details: In less than one week         Discharge Follow-up with Specified Provider: [facility]; 1 Week   As directed      To: [facility]    Follow Up: 1 Week         Discharge Follow-up with Specified Provider: Dr Stoner; 2 Weeks   As directed      To: Dr Stoner    Follow Up: 2 Weeks               Pertinent  and/or Most Recent Results     PROCEDURES:       LAB RESULTS:      Lab 04/06/23  1118 04/05/23 2233   WBC 7.34 10.36   HEMOGLOBIN 12.4* 12.6*   HEMATOCRIT 34.3* 35.7*   PLATELETS 268 297   NEUTROS ABS 4.68 5.60   IMMATURE GRANS (ABS) 0.01 0.03   LYMPHS ABS 1.69 3.73*   MONOS ABS 0.68 0.68   EOS ABS 0.24 0.28   MCV 87.9 89.0   PROTIME  --  13.0   APTT  --  28.3         Lab 04/06/23  1118 04/05/23 2233   SODIUM 138 140   POTASSIUM 3.7 3.8   CHLORIDE 104 104   CO2 25.5 26.4   ANION GAP 8.5 9.6   BUN 12 15   CREATININE 0.73* 0.87   EGFR 110.2 104.5   GLUCOSE 253* 166*   CALCIUM 8.7 9.3   MAGNESIUM 1.7  --    PHOSPHORUS 3.7  --    HEMOGLOBIN A1C  --  10.40*         Lab 04/06/23  1118 04/05/23 2233   TOTAL PROTEIN 6.3 6.8   ALBUMIN 3.9 4.3   GLOBULIN 2.4 2.5   ALT (SGPT) 16 19   AST (SGOT) 14 14   BILIRUBIN 0.8 0.6   ALK PHOS 97 103         Lab 04/05/23 2233   HSTROP T 13   PROTIME 13.0   INR 0.97         Lab 04/07/23  0457   CHOLESTEROL 189   LDL CHOL 131*   HDL CHOL 41   TRIGLYCERIDES 90         Lab 04/05/23 2313 04/05/23 2233   ABO TYPING O O   RH TYPING Positive Positive   ANTIBODY SCREEN  --  Negative         Brief Urine Lab Results  (Last result in the past 365 days)      Color   Clarity   Blood   Leuk Est   Nitrite   Protein   CREAT   Urine HCG        04/05/23 2325 Yellow   Clear   Negative   Negative   Negative   Negative                Microbiology Results (last 10 days)     ** No results found for the last 240 hours. **          CT Angiogram Neck    Result Date: 4/6/2023  Impression:   1. No emergent large vessel occlusion is seen.  2. No hemodynamically significant stenoses are suspected.  3. No arterial dissection is identified.  4. The left vertebral artery is dominant.  5. Please see the cerebral CTA exam report (from the same day) for further detail regarding additional findings.     Please note that portions of this note were completed with a voice recognition program.  TIEN BLANCO JR, MD       Electronically Signed and Approved By: TIEN BLANCO JR, MD on 4/06/2023 at 0:48              MRI Brain Without Contrast    Result Date: 4/6/2023  Impression:   1. Focal subacute infarct within left basal ganglia.  No hemorrhagic transformation or significant mass effect. 2. Findings suggestive of mild chronic small vessel ischemic disease.      MARISOL CARPENTER MD       Electronically Signed and Approved By: MARISOL CARPENTER MD on 4/06/2023 at 16:52             XR Chest 1 View    Result Date: 4/6/2023  Impression:   No acute infiltrate is appreciated.     Please note that portions of this note were completed with a voice recognition program.  TIEN BLANCO JR, MD       Electronically Signed and Approved By: TIEN BLANCO JR, MD on 4/06/2023 at 0:01              CT Head Without Contrast Stroke Protocol    Result Date: 4/5/2023  Impression:   No acute brain abnormality is seen.  No acute intracranial hemorrhage.  No acute infarct.    Please note that portions of this note were completed with a voice recognition program.  TIEN BLANCO JR, MD       Electronically Signed and Approved By: TIEN BLANCO JR, MD on 4/05/2023 at 23:37              CT Angiogram Head w AI Analysis of LVO    Addendum Date: 4/6/2023    ADDENDUM:  Upon further review, in the TECHNIQUE subheading (above), it incorrectly states that the study was performed without and  with intravenous contrast agent administration.  Only with intravenous contrast agent was used for this CTA exam.   Please note that portions of this note were completed with a voice recognition program.  TIEN BLANCO JR, MD       Electronically Signed and Approved By: TIEN BLANCO JR, MD on 4/06/2023 at 0:47              Result Date: 4/6/2023  Impression:  No emergent large vessel occlusion is identified.  Please see above comments for further detail.    Please note that portions of this note were completed with a voice recognition program.  TIEN BLANCO JR, MD       Electronically Signed and Approved By: TIEN BLANCO JR, MD on 4/06/2023 at 0:40                       Results for orders placed in visit on 03/14/22    Adult Transthoracic Echo Complete W/ Cont if Necessary Per Protocol    Interpretation Summary  · Mild global hypokinesis left ventricle with a calculated LV ejection fraction 42%. There is some improvement in ejection fraction compared to previous echocardiograms.  · Left ventricular wall thickness is consistent with mild concentric hypertrophy.  · There is diastolic dysfunction of the left ventricle.  · Estimated right ventricular systolic pressure from tricuspid regurgitation is normal (<35 mmHg).      Labs Pending at Discharge:        Time spent on Discharge including face to face service:  38 minutes    Electronically signed by Greyson Dudley MD, 04/07/23, 12:40 PM EDT.

## 2023-04-07 NOTE — THERAPY EVALUATION
Acute Care - Physical Therapy Initial Evaluation   Neri     Patient Name: Demetrio Mariano  : 1970  MRN: [id number]  Today's Date: 2023     Admit date: 2023     Referring Physician: Greyson Dudley MD     Surgery Date:* No surgery found *                Visit Dx:     ICD-10-CM ICD-9-CM   1. Acute CVA (cerebrovascular accident)  I63.9 434.91   2. Dysarthria  R47.1 784.51   3. Difficulty in walking  R26.2 719.7     Patient Active Problem List   Diagnosis   • Non-ischemic cardiomyopathy (HCC)   • Coronary artery disease involving native heart without angina pectoris   • ICD (implantable cardioverter-defibrillator), dual, in situ   • Mixed hyperlipidemia   • Polycythemia   • Type I diabetes mellitus, uncontrolled   • Essential hypertension   • Type 1 diabetes mellitus with vascular disease   • Nicotine dependence   • Acute CVA (cerebrovascular accident)     Past Medical History:   Diagnosis Date   • CHF (congestive heart failure)    • Coronary artery disease involving native heart without angina pectoris      Nonobstructive coronary artery disease involving diagonal branch per cardiac catheterization done on 2021   • Essential hypertension, benign    • Hypercholesterolemia    • ICD (implantable cardioverter-defibrillator), dual, in situ 2021   • Nicotine dependence    • Non-ischemic cardiomyopathy (CMS/HCC) 2021    Mildly dilated left ventricular cavity with severe global hypokinesis with estimated LV ejection fraction of       28%. 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    • Type I diabetes mellitus, uncontrolled      Past Surgical History:   Procedure Laterality Date   • CARDIAC CATHETERIZATION     • CARDIAC ELECTROPHYSIOLOGY PROCEDURE N/A 06/15/2021    Procedure: ICD SC new;  Surgeon: Regulo Coyne MD;  Location: [facility] INVASIVE LOCATION;  Service: Cardiovascular;  Laterality: N/A;   •  FRACTURE SURGERY Right     crushed heel injury with 14 fractures   • OTHER SURGICAL HISTORY  06/10/2013    plication for treatment of pyronie's disease   • VASECTOMY       PT Assessment (last 12 hours)     PT Evaluation and Treatment     Row Name 04/07/23 1000          Physical Therapy Time and Intention    Subjective Information no complaints  -LAWSON     Document Type evaluation  -LAWSON     Mode of Treatment individual therapy;physical therapy  -LAWSNO     Patient Effort good  -LAWSON     Row Name 04/07/23 1000          General Information    Patient Observations alert;cooperative;agree to therapy  -LAWSON     Prior Level of Function independent:;all household mobility;community mobility  -LAWSON     Equipment Currently Used at Home none  -LAWSON     Existing Precautions/Restrictions no known precautions/restrictions  -LAWSON     Barriers to Rehab none identified  -LAWSON     Row Name 04/07/23 1000          Living Environment    Current Living Arrangements home  -LAWSON     People in Home spouse  -LAWSON     [facility] Name 04/07/23 1000          Range of Motion (ROM)    Range of Motion ROM is WFL  -[facility] Name 04/07/23 1000          Strength (Manual Muscle Testing)    Strength (Manual Muscle Testing) strength is [facility]  -[facility] Name 04/07/23 1000          Bed Mobility    Bed Mobility bed mobility (all) activities  -LAWSON     All Activities, [place] (Bed Mobility) independent  -LAWSON     Row Name 04/07/23 1000          Transfers    Transfers sit-stand transfer  -LAWSON     Row Name 04/07/23 1000          Sit-Stand Transfer    Sit-Stand [place] (Transfers) independent  -LAWSON     [facility] Name 04/07/23 1000          Gait/Stairs (Locomotion)    Gait/Stairs Locomotion gait/ambulation independence  -LAWSON     [place] Level (Gait) independent  -LAWSON     Distance in Feet (Gait) 300  -LAWSON     Row Name 04/07/23 1000          Balance    Balance Assessment standing dynamic balance  -LAWSON     Dynamic Standing Balance independent  -LAWSON     [facility] Name 04/07/23 1000          Plan of  Care Review    Plan of Care Reviewed With patient  -LAWSON     Outcome Evaluation Pt did not demonstrate any safety or mobility deficits during the evaluation.  Recommend return home independently  -LAWSON     Row Name 04/07/23 1000          Therapy Assessment/Plan (PT)    Criteria for Skilled Interventions Met (PT) no problems identified which require skilled intervention  -LAWSON     Therapy Frequency (PT) evaluation only  -LAWSON     Row Name 04/07/23 1000          PT Evaluation Complexity    History, PT Evaluation Complexity no personal factors and/or comorbidities  -LAWSON     Examination of Body Systems (PT Eval Complexity) total of 4 or more elements  -LAWSON     Clinical Presentation (PT Evaluation Complexity) stable  -LAWSON     Clinical Decision Making (PT Evaluation Complexity) low complexity  -LAWSON     Overall Complexity (PT Evaluation Complexity) low complexity  -LAWSON           User Key  (r) = Recorded By, (t) = Taken By, (c) = Cosigned By    Initials Name Provider Type    Kash Louie, PT Physical Therapist                  PT Recommendation and Plan  Anticipated Discharge Disposition (PT): home  Therapy Frequency (PT): evaluation only  Plan of Care Reviewed With: patient  Outcome Evaluation: Pt did not demonstrate any safety or mobility deficits during the evaluation.  Recommend return home independently   Outcome Measures     Row Name 04/07/23 1000             How much help from another person do you currently need...    Turning from your back to your side while in flat bed without using bedrails? 4  -LAWSON      Moving from lying on back to sitting on the side of a flat bed without bedrails? 4  -LAWSON      Moving to and from a bed to a chair (including a wheelchair)? 4  -LAWSON      Standing up from a chair using your arms (e.g., wheelchair, bedside chair)? 4  -LAWSON      Climbing 3-5 steps with a railing? 4  -LAWSON      To walk in hospital room? 4  -LAWSON      AM-PAC 6 Clicks Score (PT) 24  -LAWSON         Functional Assessment    Outcome Measure  Options AM-PAC 6 Clicks Basic Mobility (PT)  -LAWSON            User Key  (r) = Recorded By, (t) = Taken By, (c) = Cosigned By    Initials Name Provider Type    Kash Louie PT Physical Therapist                 Time Calculation:    PT Charges     Row Name 04/07/23 1011             Time Calculation    PT Received On 04/07/23  -LAWSON         Untimed Charges    PT Eval/Re-eval Minutes 35  -LAWSON         Total Minutes    Untimed Charges Total Minutes 35  -LAWSON       Total Minutes 35  -LAWSON            User Key  (r) = Recorded By, (t) = Taken By, (c) = Cosigned By    Initials Name Provider Type    Kash Louie, LINDEN Physical Therapist              Therapy Charges for Today     Code Description Service Date Service Provider Modifiers Qty    08291066745 HC PT EVAL LOW COMPLEXITY 3 4/7/2023 Kash Choe PT GP 1          PT G-Codes  Outcome Measure Options: AM-PAC 6 Clicks Basic Mobility (PT)  AM-PAC 6 Clicks Score (PT): 24    Kash Choe, LINDEN  4/7/2023

## 2023-04-08 NOTE — OUTREACH NOTE
Prep Survey    Flowsheet Row Responses   [facility] facility patient discharged from? [facility]   Is LACE score < 7 ? No   Eligibility Readm Mgmt   Discharge diagnosis CVA   Does the patient have one of the following disease processes/diagnoses(primary or secondary)? Stroke   Does the patient have Home health ordered? No   Is there a DME ordered? No   Medication alerts for this patient plavix   Prep survey completed? Yes          Mary QUESADA - Registered Nurse

## 2023-04-10 ENCOUNTER — READMISSION MANAGEMENT (OUTPATIENT)
Dept: CALL CENTER | Facility: HOSPITAL | Age: 53
End: 2023-04-10
Payer: COMMERCIAL

## 2023-04-10 ENCOUNTER — TELEPHONE (OUTPATIENT)
Dept: NEUROLOGY | Facility: CLINIC | Age: 53
End: 2023-04-10

## 2023-04-10 NOTE — OUTREACH NOTE
Stroke Week 1 Survey    Flowsheet Row Responses   [facility] patient discharged from? [facility]   Does the patient have one of the following disease processes/diagnoses(primary or secondary)? Stroke   Week 1 attempt successful? Yes   Call start time 1641   Call end time 1646   Discharge diagnosis CVA   Person spoke with today (if not patient) and relationship Patient   Meds reviewed with patient/caregiver? Yes   Does the patient have all medications ordered at discharge? Yes   Is the patient taking all medications as directed (includes completed medication regime)? Yes   Does the patient have a primary care provider?  Yes   Does the patient have an appointment with their PCP within 7 days of discharge? Yes   Comments regarding PCP Follow up with ZEINAB Onofre PCP. Appt Thurs 4/13   Has the patient kept scheduled appointments due by today? N/A   Has home health visited the patient within 72 hours of discharge? N/A   Psychosocial issues? No   Does the patient require any assistance with activities of daily living such as eating, bathing, dressing, walking, etc.? No   Does the patient have any residual symptoms from stroke/TIA? Yes   Residual symptoms comments Still reports some difficulty with speech, improving and feeling off balance.    Does the patient understand the diet ordered at discharge? Yes   Did the patient receive a copy of their discharge instructions? Yes   Nursing interventions Reviewed instructions with patient   What is the patient's perception of their health status since discharge? Improving   Nursing interventions Nurse provided patient education   Is the patient/caregiver able to teach back the risk factors for a stroke? Smoking, Diabetes, High Cholesterol, High blood pressure-goal below 120/80   Is the patient/caregiver able to teach back signs and symptoms related to disease process for when to call PCP? Yes   Is the patient/caregiver able to teach back signs and symptoms related to  disease process for when to call 911? Yes   Is the patient/caregiver able to teach back the hierarchy of who to call/visit for symptoms/problems? PCP, Specialist, Home health nurse, Urgent Care, ED, 911 Yes   Is the patient able to teach back FAST for Stroke? B alance: Watch for sudden loss of balance, E yes: Check for vision loss, F ace: Look for an uneven smile, A rm: Check if one arm is weak, S peech: Listen for slurred speech, T olga: Call 9-1-1 right away   Week 1 call completed? Yes          ЕЛЕНА VERA - Registered Nurse

## 2023-04-11 ENCOUNTER — APPOINTMENT (OUTPATIENT)
Dept: CT IMAGING | Facility: HOSPITAL | Age: 53
End: 2023-04-11
Payer: COMMERCIAL

## 2023-04-11 ENCOUNTER — APPOINTMENT (OUTPATIENT)
Dept: CARDIOLOGY | Facility: HOSPITAL | Age: 53
End: 2023-04-11
Payer: COMMERCIAL

## 2023-04-11 ENCOUNTER — READMISSION MANAGEMENT (OUTPATIENT)
Dept: CALL CENTER | Facility: HOSPITAL | Age: 53
End: 2023-04-11
Payer: COMMERCIAL

## 2023-04-11 ENCOUNTER — APPOINTMENT (OUTPATIENT)
Dept: GENERAL RADIOLOGY | Facility: HOSPITAL | Age: 53
End: 2023-04-11
Payer: COMMERCIAL

## 2023-04-11 ENCOUNTER — HOSPITAL ENCOUNTER (OUTPATIENT)
Facility: HOSPITAL | Age: 53
Setting detail: OBSERVATION
Discharge: HOME | End: 2023-04-13
Attending: EMERGENCY MEDICINE | Admitting: INTERNAL MEDICINE
Payer: COMMERCIAL

## 2023-04-11 DIAGNOSIS — R47.1 DYSARTHRIA: ICD-10-CM

## 2023-04-11 DIAGNOSIS — R13.12 OROPHARYNGEAL DYSPHAGIA: ICD-10-CM

## 2023-04-11 DIAGNOSIS — I63.9 CEREBROVASCULAR ACCIDENT (CVA), UNSPECIFIED MECHANISM: Primary | ICD-10-CM

## 2023-04-11 DIAGNOSIS — Z78.9 DECREASED ACTIVITIES OF DAILY LIVING (ADL): ICD-10-CM

## 2023-04-11 DIAGNOSIS — R26.2 DIFFICULTY WALKING: ICD-10-CM

## 2023-04-11 LAB
ABO GROUP BLD: NORMAL
ALBUMIN SERPL-MCNC: 4.6 G/DL (ref 3.5–5.2)
ALBUMIN/GLOB SERPL: 1.8 G/DL
ALP SERPL-CCNC: 109 U/L (ref 39–117)
ALT SERPL W P-5'-P-CCNC: 33 U/L (ref 1–41)
ANION GAP SERPL CALCULATED.3IONS-SCNC: 12.7 MMOL/L (ref 5–15)
APTT PPP: 27.3 SECONDS (ref 24.2–34.2)
AST SERPL-CCNC: 20 U/L (ref 1–40)
BASOPHILS # BLD AUTO: 0.06 10*3/MM3 (ref 0–0.2)
BASOPHILS NFR BLD AUTO: 0.5 % (ref 0–1.5)
BILIRUB SERPL-MCNC: 0.7 MG/DL (ref 0–1.2)
BLD GP AB SCN SERPL QL: NEGATIVE
BUN SERPL-MCNC: 20 MG/DL (ref 6–20)
BUN/CREAT SERPL: 25.6 (ref 7–25)
CALCIUM SPEC-SCNC: 9.2 MG/DL (ref 8.6–10.5)
CHLORIDE SERPL-SCNC: 100 MMOL/L (ref 98–107)
CHOLEST SERPL-MCNC: 146 MG/DL (ref 0–200)
CO2 SERPL-SCNC: 24.3 MMOL/L (ref 22–29)
CREAT SERPL-MCNC: 0.78 MG/DL (ref 0.76–1.27)
DEPRECATED RDW RBC AUTO: 43.2 FL (ref 37–54)
EGFRCR SERPLBLD CKD-EPI 2021: 108 ML/MIN/1.73
EOSINOPHIL # BLD AUTO: 0.28 10*3/MM3 (ref 0–0.4)
EOSINOPHIL NFR BLD AUTO: 2.4 % (ref 0.3–6.2)
ERYTHROCYTE [DISTWIDTH] IN BLOOD BY AUTOMATED COUNT: 13.2 % (ref 12.3–15.4)
GLOBULIN UR ELPH-MCNC: 2.6 GM/DL
GLUCOSE BLDC GLUCOMTR-MCNC: 145 MG/DL (ref 70–99)
GLUCOSE BLDC GLUCOMTR-MCNC: 214 MG/DL (ref 70–99)
GLUCOSE SERPL-MCNC: 189 MG/DL (ref 65–99)
HCT VFR BLD AUTO: 37 % (ref 37.5–51)
HDLC SERPL-MCNC: 48 MG/DL (ref 40–60)
HGB BLD-MCNC: 13.2 G/DL (ref 13–17.7)
HOLD SPECIMEN: NORMAL
HOLD SPECIMEN: NORMAL
IMM GRANULOCYTES # BLD AUTO: 0.03 10*3/MM3 (ref 0–0.05)
IMM GRANULOCYTES NFR BLD AUTO: 0.3 % (ref 0–0.5)
INR PPP: 1 (ref 0.86–1.15)
LDLC SERPL CALC-MCNC: 84 MG/DL (ref 0–100)
LDLC/HDLC SERPL: 1.74 {RATIO}
LYMPHOCYTES # BLD AUTO: 2.86 10*3/MM3 (ref 0.7–3.1)
LYMPHOCYTES NFR BLD AUTO: 24.5 % (ref 19.6–45.3)
MCH RBC QN AUTO: 31.9 PG (ref 26.6–33)
MCHC RBC AUTO-ENTMCNC: 35.7 G/DL (ref 31.5–35.7)
MCV RBC AUTO: 89.4 FL (ref 79–97)
MONOCYTES # BLD AUTO: 0.79 10*3/MM3 (ref 0.1–0.9)
MONOCYTES NFR BLD AUTO: 6.8 % (ref 5–12)
NEUTROPHILS NFR BLD AUTO: 65.5 % (ref 42.7–76)
NEUTROPHILS NFR BLD AUTO: 7.66 10*3/MM3 (ref 1.7–7)
NRBC BLD AUTO-RTO: 0 /100 WBC (ref 0–0.2)
PLATELET # BLD AUTO: 310 10*3/MM3 (ref 140–450)
PMV BLD AUTO: 9.5 FL (ref 6–12)
POTASSIUM SERPL-SCNC: 4.5 MMOL/L (ref 3.5–5.2)
PROT SERPL-MCNC: 7.2 G/DL (ref 6–8.5)
PROTHROMBIN TIME: 13.3 SECONDS (ref 11.8–14.9)
RBC # BLD AUTO: 4.14 10*6/MM3 (ref 4.14–5.8)
RH BLD: POSITIVE
SODIUM SERPL-SCNC: 137 MMOL/L (ref 136–145)
T&S EXPIRATION DATE: NORMAL
TRIGL SERPL-MCNC: 73 MG/DL (ref 0–150)
TROPONIN T SERPL HS-MCNC: 12 NG/L
VLDLC SERPL-MCNC: 14 MG/DL (ref 5–40)
WBC NRBC COR # BLD: 11.68 10*3/MM3 (ref 3.4–10.8)
WHOLE BLOOD HOLD COAG: NORMAL
WHOLE BLOOD HOLD SPECIMEN: NORMAL

## 2023-04-11 PROCEDURE — 82962 GLUCOSE BLOOD TEST: CPT

## 2023-04-11 PROCEDURE — G0378 HOSPITAL OBSERVATION PER HR: HCPCS

## 2023-04-11 PROCEDURE — 93005 ELECTROCARDIOGRAM TRACING: CPT | Performed by: EMERGENCY MEDICINE

## 2023-04-11 PROCEDURE — 80061 LIPID PANEL: CPT | Performed by: INTERNAL MEDICINE

## 2023-04-11 PROCEDURE — 83036 HEMOGLOBIN GLYCOSYLATED A1C: CPT | Performed by: INTERNAL MEDICINE

## 2023-04-11 PROCEDURE — 71045 X-RAY EXAM CHEST 1 VIEW: CPT

## 2023-04-11 PROCEDURE — 94761 N-INVAS EAR/PLS OXIMETRY MLT: CPT

## 2023-04-11 PROCEDURE — 70450 CT HEAD/BRAIN W/O DYE: CPT

## 2023-04-11 PROCEDURE — 93306 TTE W/DOPPLER COMPLETE: CPT | Performed by: SPECIALIST

## 2023-04-11 PROCEDURE — 86850 RBC ANTIBODY SCREEN: CPT | Performed by: EMERGENCY MEDICINE

## 2023-04-11 PROCEDURE — 85610 PROTHROMBIN TIME: CPT | Performed by: EMERGENCY MEDICINE

## 2023-04-11 PROCEDURE — 93306 TTE W/DOPPLER COMPLETE: CPT

## 2023-04-11 PROCEDURE — 94799 UNLISTED PULMONARY SVC/PX: CPT

## 2023-04-11 PROCEDURE — 25510000001 IOPAMIDOL PER 1 ML: Performed by: EMERGENCY MEDICINE

## 2023-04-11 PROCEDURE — 85025 COMPLETE CBC W/AUTO DIFF WBC: CPT | Performed by: EMERGENCY MEDICINE

## 2023-04-11 PROCEDURE — 80053 COMPREHEN METABOLIC PANEL: CPT | Performed by: EMERGENCY MEDICINE

## 2023-04-11 PROCEDURE — 86900 BLOOD TYPING SEROLOGIC ABO: CPT | Performed by: EMERGENCY MEDICINE

## 2023-04-11 PROCEDURE — 96374 THER/PROPH/DIAG INJ IV PUSH: CPT

## 2023-04-11 PROCEDURE — 86901 BLOOD TYPING SEROLOGIC RH(D): CPT | Performed by: EMERGENCY MEDICINE

## 2023-04-11 PROCEDURE — 0042T HC CT CEREBRAL PERFUSION W/WO CONTRAST: CPT

## 2023-04-11 PROCEDURE — 85730 THROMBOPLASTIN TIME PARTIAL: CPT | Performed by: EMERGENCY MEDICINE

## 2023-04-11 PROCEDURE — 70496 CT ANGIOGRAPHY HEAD: CPT

## 2023-04-11 PROCEDURE — 84484 ASSAY OF TROPONIN QUANT: CPT | Performed by: EMERGENCY MEDICINE

## 2023-04-11 PROCEDURE — 94760 N-INVAS EAR/PLS OXIMETRY 1: CPT

## 2023-04-11 PROCEDURE — 99233 SBSQ HOSP IP/OBS HIGH 50: CPT

## 2023-04-11 PROCEDURE — 70498 CT ANGIOGRAPHY NECK: CPT

## 2023-04-11 PROCEDURE — 93010 ELECTROCARDIOGRAM REPORT: CPT | Performed by: INTERNAL MEDICINE

## 2023-04-11 PROCEDURE — 99285 EMERGENCY DEPT VISIT HI MDM: CPT

## 2023-04-11 RX ORDER — DEXTROSE MONOHYDRATE 25 G/50ML
25 INJECTION, SOLUTION INTRAVENOUS ONCE
Status: COMPLETED | OUTPATIENT
Start: 2023-04-11 | End: 2023-04-11

## 2023-04-11 RX ORDER — NICOTINE 21 MG/24HR
1 PATCH, TRANSDERMAL 24 HOURS TRANSDERMAL
Status: DISCONTINUED | OUTPATIENT
Start: 2023-04-12 | End: 2023-04-13 | Stop reason: HOSPADM

## 2023-04-11 RX ORDER — DEXTROSE MONOHYDRATE 25 G/50ML
INJECTION, SOLUTION INTRAVENOUS
Status: COMPLETED
Start: 2023-04-11 | End: 2023-04-11

## 2023-04-11 RX ORDER — SODIUM CHLORIDE 9 MG/ML
40 INJECTION, SOLUTION INTRAVENOUS AS NEEDED
Status: DISCONTINUED | OUTPATIENT
Start: 2023-04-11 | End: 2023-04-13 | Stop reason: HOSPADM

## 2023-04-11 RX ORDER — SODIUM CHLORIDE 0.9 % (FLUSH) 0.9 %
10 SYRINGE (ML) INJECTION AS NEEDED
Status: DISCONTINUED | OUTPATIENT
Start: 2023-04-11 | End: 2023-04-13 | Stop reason: HOSPADM

## 2023-04-11 RX ORDER — SODIUM CHLORIDE 0.9 % (FLUSH) 0.9 %
10 SYRINGE (ML) INJECTION EVERY 12 HOURS SCHEDULED
Status: DISCONTINUED | OUTPATIENT
Start: 2023-04-11 | End: 2023-04-13 | Stop reason: HOSPADM

## 2023-04-11 RX ADMIN — IOPAMIDOL 80 ML: 755 INJECTION, SOLUTION INTRAVENOUS at 13:35

## 2023-04-11 RX ADMIN — Medication 10 ML: at 23:20

## 2023-04-11 RX ADMIN — DEXTROSE MONOHYDRATE 25 G: 25 INJECTION, SOLUTION INTRAVENOUS at 17:06

## 2023-04-11 NOTE — PLAN OF CARE
Goal Outcome Evaluation:      Patient arrived to unit from ED, NIH score of 4 at this time.

## 2023-04-11 NOTE — ED PROVIDER NOTES
Time: 1:07 PM EDT  Date of encounter:  4/11/2023  Independent Historian/Clinical History and Information was obtained by:   Patient  Chief Complaint: Stroke    History is limited by: N/A    History of Present Illness:  Patient is a 52 y.o. year old male who presents to the emergency department for evaluation of stroke. Patient reports recent history of stroke, discharged Friday. He was last seen normal last night and at some point at night, patient has new onset right sided weakness, right facial droop, and slurred speech. He claims no weakness in right arm and leg, numbness/tingliness, nausea. Patient had some residual slurred speech from previous stroke, symptoms are not as bad as previous episode.    HPI    Patient Care Team  Primary Care Provider: Kia Mayorga APRN    Past Medical History:     No Known Allergies  Past Medical History:   Diagnosis Date   • CHF (congestive heart failure)    • Coronary artery disease involving native heart without angina pectoris      Nonobstructive coronary artery disease involving diagonal branch per cardiac catheterization done on 01/21/2021   • Essential hypertension, benign    • Hypercholesterolemia    • ICD (implantable cardioverter-defibrillator), dual, in situ 09/22/2021   • Nicotine dependence    • Non-ischemic cardiomyopathy (CMS/HCC) 05/03/2021    Mildly dilated left ventricular cavity with severe global hypokinesis with estimated LV ejection fraction of       28%. 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    • Type I diabetes mellitus, uncontrolled      Past Surgical History:   Procedure Laterality Date   • CARDIAC CATHETERIZATION     • CARDIAC ELECTROPHYSIOLOGY PROCEDURE N/A 06/15/2021    Procedure: ICD SC new;  Surgeon: Regulo Coyne MD;  Location: [facility] CATH INVASIVE LOCATION;  Service: Cardiovascular;  Laterality: N/A;   • FRACTURE SURGERY Right     crushed heel injury with 14  fractures   • OTHER SURGICAL HISTORY  06/10/2013    plication for treatment of pyronie's disease   • VASECTOMY       Family History   Problem Relation Age of Onset   • Diabetes Mother    • Diabetes Father        Home Medications:  Prior to Admission medications    Medication Sig Start Date End Date Taking? Authorizing Provider   aspirin 81 MG chewable tablet Chew 1 tablet Daily for 21 days. 4/8/23 4/29/23  Greyson Dudley MD   atorvastatin (LIPITOR) 80 MG tablet Take 1 tablet by mouth Every Night for 30 days. 4/7/23 5/7/23  Greyson Dudley MD   carvedilol (COREG) 25 MG tablet Take 1 tablet by mouth 2 (Two) Times a Day. 5/31/22   Tommy Nagel MD   clopidogrel (PLAVIX) 75 MG tablet Take 1 tablet by mouth Daily. 4/8/23   Greyson Dudley MD   Continuous Blood Gluc Sensor (FreeStyle Francis 2 Sensor) misc 1 each Every 14 (Fourteen) Days. 7/27/22   Katherine Delgado APRN   furosemide (LASIX) 20 MG tablet Take 1 tablet by mouth Daily.    Provider, MD Mary   Glucagon (Baqsimi Two Pack) 3 MG/DOSE powder 3 mg into the nostril(s) as directed by provider As Needed (Severe hypoglycemia). 3/3/23   Katherine Delgado APRN   Insulin Aspart (NovoLOG) 100 UNIT/ML injection Up to 100 per day per insulin pump 7/27/22   Katherine Delgado APRN   insulin patient supplied pump Inject  under the skin into the appropriate area as directed Continuous. Type of Insulin: NOVOLOG  Basal Dose:   12 AM to 6 AM - 2.15u/hr  6 AM to 3 PM - 1.9u/hr  3 PM to 12 AM - 2.115u/hr    Bolus Dose: CORRECTION RATIO 1:40  Prescriber:KATHERINE DELGADO  Phone number:  Next refill: 4/10/23  MEDTRONIC PUMP    Provider, MD Mary   sacubitril-valsartan (ENTRESTO)  MG tablet Take 1 tablet by mouth 2 (Two) Times a Day. 5/31/22   Tommy Nagel MD   spironolactone (ALDACTONE) 25 MG tablet Take 1 tablet by mouth Daily. 1/25/22   Tommy Nagel MD        Social History:   Social History     Tobacco Use   • Smoking status: Former      "Packs/day: 1.00     Years: 37.00     Pack years: 37.00     Types: Cigarettes     Start date: 3/11/1986     Quit date: 2023     Years since quittin.0     Passive exposure: Past   • Smokeless tobacco: Never   Vaping Use   • Vaping Use: Never used   Substance Use Topics   • Alcohol use: Never     Alcohol/week: 1.0 standard drink     Types: 1 Cans of beer per week   • Drug use: Never         Review of Systems:  Review of Systems   Constitutional: Negative for chills and fever.   HENT: Negative for sore throat.    Eyes: Negative for photophobia.   Respiratory: Negative for shortness of breath.    Cardiovascular: Negative for chest pain.   Gastrointestinal: Negative for abdominal pain, diarrhea, nausea and vomiting.   Genitourinary: Negative for dysuria.   Musculoskeletal: Negative for neck pain.   Skin: Negative for wound.   Neurological: Positive for facial asymmetry (Right Facial Droop), speech difficulty (Slurred) and weakness (Right-Sided). Negative for headaches.   All other systems reviewed and are negative.       Physical Exam:  /56 (BP Location: Left arm, Patient Position: Lying)   Pulse 72   Temp 97.9 °F (36.6 °C) (Oral)   Resp 18   Ht 170.2 cm (67\")   Wt 72.9 kg (160 lb 11.5 oz)   SpO2 98%   BMI 25.17 kg/m²     Physical Exam  Vitals and nursing note reviewed.   Constitutional:       General: He is not in acute distress.  HENT:      Head: Normocephalic and atraumatic.   Eyes:      Extraocular Movements: Extraocular movements intact.   Cardiovascular:      Rate and Rhythm: Normal rate and regular rhythm.   Pulmonary:      Effort: Pulmonary effort is normal. No respiratory distress.      Breath sounds: Normal breath sounds.   Abdominal:      General: Abdomen is flat.      Palpations: Abdomen is soft.      Tenderness: There is no abdominal tenderness.   Musculoskeletal:         General: Normal range of motion.      Cervical back: Normal range of motion and neck supple.   Skin:     General: Skin " is warm and dry.      Capillary Refill: Capillary refill takes less than 2 seconds.   Neurological:      Mental Status: He is alert and oriented to person, place, and time. Mental status is at baseline.      Cranial Nerves: Dysarthria and facial asymmetry (Right Facial Droop) present.      Motor: Weakness (Right Ribcage Strength) present.      Comments: Expressive Aphasia  Slight Decrease in Sensation on Right Side                  Procedures:  Procedures      Medical Decision Making:      Comorbidities that affect care:    Seizures, Congestive Heart Failure, Coronary Artery Disease, Diabetes, Hypertension, Smoking    External Notes reviewed:    Previous ED Note: 04/05/23      The following orders were placed and all results were independently analyzed by me:  Orders Placed This Encounter   Procedures   • CT Head Without Contrast Stroke Protocol   • CT Angiogram Head w AI Analysis of LVO   • CT Angiogram Neck   • CT CEREBRAL PERFUSION WITH & WITHOUT CONTRAST   • XR Chest 1 View   • MRI Brain Without Contrast   • Harvard Draw   • Comprehensive Metabolic Panel   • Protime-INR   • aPTT   • Single High Sensitivity Troponin T   • CBC Auto Differential   • Hemoglobin A1c   • Lipid Panel   • Basic Metabolic Panel   • CBC (No Diff)   • Magnesium   • Ambulatory Referral to Neurology   • Ambulatory Referral to Physical Therapy   • Referral to Occupational Therapy   • Referral to Speech Therapy   • Initiate Department's Acute Stroke Process (Team D, Code 19, etc)   • Perform NIH Stroke Scale   • Measure Actual Weight   • Head of Bed 30 Degrees or Less   • Undress and Gown   • Vital Signs   • Notify MD for SBP < 80 or > 200   • Notify Provider for SBP greater than 140 if hemorrhagic Stroke   • No Hypotonic Fluids   • Nursing Swallow Assessment   • Vital Signs   • Pulse Oximetry, Continuous   • Notify Provider   • Nursing Dysphagia Screening (Complete Prior to Giving Anything By Mouth)   • RN to Place Order SLP Consult - Eval &  Treat Choosing Reason of RN Dysphagia Screen Failed   • Nurse to Call MD or Nutrition Services for Diet if Patient Passes Dysphagia Screen   • Neuro Checks   • NIHSS Assessment   • Provide Stroke Education Material   • Saline Lock & Maintain IV Access   • Place Sequential Compression Device   • Activity As Tolerated   • Call MD With Problems / Concerns   • Activity as Tolerated   • Diet: Cardiac Diets; Healthy Heart (2-3 Na+); Regular Texture (IDDSI 7); Thin (IDDSI 0)   • Inpatient Diabetes Educator Consult   • POC Glucose Once   • POC Glucose Once   • POC Glucose Once   • POC Glucose Once   • POC Glucose Once   • POC Glucose Once   • POC Glucose Once   • POC Glucose Once   • POC Glucose Once   • Check Blood Glucose - Fingerstick   • ECG 12 Lead ED Triage Standing Order; Acute Stroke (Onset <12 hrs)   • SCANNED - TELEMETRY     • Adult Transthoracic Echo Complete W/ Cont if Necessary Per Protocol (With Agitated Saline)   • Type & Screen   • Initiate Observation Status   • Discharge patient   • CBC & Differential   • Green Top (Gel)   • Lavender Top   • Gold Top - SST   • Light Blue Top       Medications Given in the Emergency Department:  Medications   iopamidol (ISOVUE-370) 76 % injection 100 mL (80 mL Intravenous Given 4/11/23 1335)   dextrose (D50W) (25 g/50 mL) IV injection 25 g (25 g Intravenous Given 4/11/23 1706)   lactated ringers bolus 500 mL (500 mL Intravenous New Bag 4/13/23 1837)        ED Course:         Labs:    Lab Results (last 24 hours)     ** No results found for the last 24 hours. **           Imaging:    No Radiology Exams Resulted Within Past 24 Hours      Differential Diagnosis and Discussion:    Weakness: Based on the patient's history, signs, and symptoms, the diffential diagnosis includes but is not limited to meningitis, stroke, sepsis, subarachnoid hemorrhage, intracranial bleeding, encephalitis, acute uti, dehydration, MS, myasthenia gravis, Guillan [place], migraine variant, neuromuscular  disorders vertigo, electrolyte imbalance, and metabolic disorders.    All labs were reviewed and interpreted by me.    MDM   Patient is not a TNK candidate due to recent stroke within the past 3 months.      Patient Care Considerations:    MRI: I considered ordering an MRI however MRI will be obtained inpatient.      Consultants/Shared Management Plan:  Patient discussed with Dr. Morfin, teleneurology, who recommends admission for MRI and additional work-up.  He states patient is not a TNK candidate due to recent stroke.    Patient discussed with Dr. Weller, hospitalist, who will admit the patient.      Social Determinants of Health:    Patient is independent, reliable, and has access to care.       Disposition and Care Coordination:    Admit:   Through independent evaluation of the patient's history, physical, and imperical data, the patient meets criteria for observation/admission to the hospital.        Final diagnoses:   Cerebrovascular accident (CVA), unspecified mechanism        ED Disposition     ED Disposition   Decision to Admit    Condition   --    Comment   Level of Care: Telemetry [5]   Diagnosis: Stroke [[id number]]   Admitting Physician: BO WELLER [[id number]]   Attending Physician: BO WELLER [[id number]]             Documentation assistance provided by Vargas Calderon acting as scribe for No att. providers found. Information recorded by the scribe was done at my direction and has been verified and validated by me.     This medical record created using voice recognition software.           Vargas Calderon  04/11/23 1337       Vargas Chapin DO  04/14/23 3804

## 2023-04-11 NOTE — CONSULTS
ADDENDUM:  -CTP shows bifrontal Tmax6 abnormality that is probably artifactual (no formal report yet).  -CTA: I do not see lvo, formal report pending    [facility]  [facility]      ADDENDUM  -CTA head/neck: No LVO or significant stenosis.     No indication for thrombectomy  New recommendations: None  Please also see initial recommendations.      Patient Name:   Demetrio Mariano  YOB: 1970  Identification Number:   MRN - [id number]  Date of Service:   04/11/2023 12:53:11    Diagnosis:      •  I63.9 - Cerebrovascular accident (CVA), unspecified mechanism (HCC)    Impression:      • 51 y/o with PMH of righthandedness, smoking, DM, CHF, pacer, right pontine ischemic stroke 1/23, left basal ganglia stroke one week ago (residual slurred speech without other residual symptoms; d/c'd on DAPT). LKW about 4:00 this am with subsequent gait unsteadiness, worse dysarthria, right arm and leg weakness. NIHSS 3 (mild dysarthria, reduced LT right side, RLE dysmetria).      He is not a candidate for thrombolysis (stroke w/in 3 mos; LKW>4.5h).      Recommendations/Plan:   1) CTA/P is pending, and I will addend this note with results and further recommendations if needed.   2) If the CTA shows no LVO then I recommend an unenhanced brain MRI.   3) I do not see that he has had a TTE since 9/21. A very low EF may be an indication for chronic oral anticoagulation, and the TTE should be repeated.   4) This is the third ischemic stroke event he has had in three months. He has stroke risk factors, but he is young. Consider hypercoagulability workup.   5) Continue DAPT   6) Further recommendations per neurology pending the above results         ------------------------------------------------------------------------------    Advanced Imaging:  CTA Head and Neck Completed.    LVO:No    Patient doesn't meet criteria for emergent RONY consideration      Metrics:  Last Known Well:  04/11/2023 04:00:37  [facility] Notification Time: 04/11/2023 12:48:27  Arrival Time: 04/11/2023 12:46:32  Stamp Time: 04/11/2023 12:53:11  Initial Response Time: 04/11/2023 12:56:17  Symptoms: weakness.  NIHSS Start Assessment Time: 04/11/2023 13:00:37  Patient is not a candidate for Thrombolytic.  Thrombolytic Medical Decision: 04/11/2023 12:58:02  Patient was not deemed candidate for Thrombolytic because of following reasons:  Last Well Known Above 4.5 Hours.  Stroke in last 3 months.    CT head showed no acute hemorrhage or acute core infarct.    ED Physician notified of diagnostic impression and management plan on 04/11/2023 13:13:22        ------------------------------------------------------------------------------    History of Present Illness:  Patient is a 52 year old Male.    Patient was brought by EMS for symptoms of weakness.  53 y/o with PMH of righthandedness, smoking, DM, CHF, pacer, right pontine ischemic stroke 1/23, left basal ganglia stroke one week ago (residual slurred speech without other residual symptoms; d/c'd on DAPT). LKW about 4:00 this am with subsequent gait unsteadiness, worse dysarthria, right arm and leg weakness.    -TTE 9/21: EF 42%      Past Medical History:      • Diabetes Mellitus      • There is no history of Hypertension    Medications:    No Anticoagulant use   Antiplatelet use: Yes plavix , asa  Reviewed EMR for current medications    Allergies:   Reviewed    Social History:  Smoking: Yes    Family History:    There is no family history of premature cerebrovascular disease pertinent to this consultation    ROS :  14 Points Review of Systems was performed and was negative except mentioned in HPI.    Past Surgical History:  There Is No Surgical History Contributory To Today’s Visit        Examination:  BP(128/80), Pulse(72),  1A: Level of Consciousness - Alert; keenly responsive + 0  1B: Ask Month and Age - Both Questions Right + 0  1C: Blink Eyes & Squeeze Hands -  Performs Both Tasks + 0  2: Test Horizontal Extraocular Movements - Normal + 0  3: Test Visual Fields - No Visual Loss + 0  4: Test Facial Palsy (Use Grimace if Obtunded) - Normal symmetry + 0  5A: Test Left Arm Motor Drift - No Drift for 10 Seconds + 0  5B: Test Right Arm Motor Drift - No Drift for 10 Seconds + 0  6A: Test Left Leg Motor Drift - No Drift for 5 Seconds + 0  6B: Test Right Leg Motor Drift - No Drift for 5 Seconds + 0  7: Test Limb Ataxia (FNF/Heel-Shin) - Ataxia in 1 Limb + 1  8: Test Sensation - Mild-Moderate Loss: Less Sharp/More Dull + 1  9: Test Language/Aphasia - Normal; No aphasia + 0  10: Test Dysarthria - Mild-Moderate Dysarthria: Slurring but can be understood + 1  11: Test Extinction/Inattention - No abnormality + 0    NIHSS Score: 3    NIHSS Free Text : RLE dysmetria. Reduced LT right face arm leg    Pre-Morbid Modified Shayan Scale:  1 Points = No significant disability despite symptoms; able to carry out all usual duties and activities    I reviewed the available imaging via A.I. software Rapid and initiated discussion with the primary provider    Patient/Family was informed the Neurology Consult would occur via TeleHealth consult by way of interactive audio and video telecommunications and consented to receiving care in this manner.      Patient is being evaluated for possible acute neurologic impairment and high probability of imminent or life-threatening deterioration. I spent total of 30 minutes providing care to this patient, including time for face to face visit via telemedicine, review of medical records, imaging studies and discussion of findings with providers, the patient and/or family.      Dr Kash Morfin      [facility]  [phone]      Case [id number]

## 2023-04-11 NOTE — SIGNIFICANT NOTE
04/11/23 1816   Living Environment   People in Home spouse   Name(s) of People in Home Wife, Judy Mariano   Current Living Arrangements home   Primary Care Provided by self   Provides Primary Care For no one   Caregiving Concerns none   Family Caregiver if Needed none   Quality of Family Relationships supportive   Able to Return to Prior Arrangements yes   Living Arrangement Comments return home to be with his wife   Resource/Environmental Concerns   Transportation Concerns none   Transition Planning   Patient/Family Anticipates Transition to home   Patient/Family Anticipated Services at Transition none   Transportation Anticipated car, drives self   Discharge Needs Assessment   Readmission Within the Last 30 Days other (see comments)  (Pt was here 4/5/23 for same condition.)   Equipment Currently Used at Home none   Concerns to be Addressed no discharge needs identified   Anticipated Changes Related to Illness none   Equipment Needed After Discharge none     SW student appeared bedside to pt who was alert and comical. Pt's wife, Judy Mariano, was present beside him in the room while the pt answered questions. Pt stated that he lives in [facility], he has a living will on file and besides his wife there is no one else that lives with them. Pt stated that there is no other support in the area for them. Pt stated that he is independent, plans to return home after discharge and does not use any medical equipment at this time. Pt does not use home healthcare, is not on dialysis, and does not use VA benefits. Pt stated that his PCP is MARU Mayorga, he is able to drive himself around, can afford his medication and does not need assistance, and received his medication from the [facility] in [place], [place]. Pt stated that he has been admitted within the last week for the same condition.

## 2023-04-11 NOTE — ED NOTES
No change in status  pt is getting CTA and CT perfusion done currently   Writer LVM meds are  ready for pick-up and to bring in her BP reading to visit on 02/02/2022

## 2023-04-12 ENCOUNTER — APPOINTMENT (OUTPATIENT)
Dept: MRI IMAGING | Facility: HOSPITAL | Age: 53
End: 2023-04-12
Payer: COMMERCIAL

## 2023-04-12 LAB
ASCENDING AORTA: 3.1 CM
BH CV ECHO MEAS - AO ROOT DIAM: 3.1 CM
BH CV ECHO MEAS - EDV(MOD-SP2): 91 ML
BH CV ECHO MEAS - EDV(MOD-SP4): 105 ML
BH CV ECHO MEAS - EF(MOD-BP): 43 %
BH CV ECHO MEAS - ESV(MOD-SP2): 57 ML
BH CV ECHO MEAS - ESV(MOD-SP4): 56 ML
BH CV ECHO MEAS - IVSD: 1.1 CM
BH CV ECHO MEAS - LA DIMENSION: 3.3 CM
BH CV ECHO MEAS - LAT PEAK E' VEL: 10.1 CM/SEC
BH CV ECHO MEAS - LVIDD: 5.2 CM
BH CV ECHO MEAS - LVIDS: 4 CM
BH CV ECHO MEAS - LVOT DIAM: 2.1 CM
BH CV ECHO MEAS - LVPWD: 1.4 CM
BH CV ECHO MEAS - MED PEAK E' VEL: 5.2 CM/SEC
BH CV ECHO MEAS - MV A MAX VEL: 45 CM/SEC
BH CV ECHO MEAS - MV DEC TIME: 424 MSEC
BH CV ECHO MEAS - MV E MAX VEL: 48 CM/SEC
BH CV ECHO MEAS - MV E/A: 1.1
BH CV ECHO MEAS - RAP SYSTOLE: 3 MMHG
BH CV ECHO MEAS - RVDD: 2.8 CM
BH CV ECHO MEAS - RVSP: 19 MMHG
BH CV ECHO MEAS - TR MAX PG: 16 MMHG
BH CV ECHO MEAS - TR MAX VEL: 203 CM/SEC
BH CV ECHO MEASUREMENTS AVERAGE E/E' RATIO: 6.27
GLUCOSE BLDC GLUCOMTR-MCNC: 127 MG/DL (ref 70–99)
GLUCOSE BLDC GLUCOMTR-MCNC: 354 MG/DL (ref 70–99)
GLUCOSE BLDC GLUCOMTR-MCNC: 86 MG/DL (ref 70–99)
HBA1C MFR BLD: 9.9 % (ref 4.8–5.6)
IVRT: 132 MSEC
LEFT ATRIUM VOLUME INDEX: 23 ML/M2
LV EF 2D ECHO EST: 50 %
MAXIMAL PREDICTED HEART RATE: 169 BPM
QT INTERVAL: 432 MS
STRESS TARGET HR: 144 BPM

## 2023-04-12 PROCEDURE — G0378 HOSPITAL OBSERVATION PER HR: HCPCS

## 2023-04-12 PROCEDURE — 96372 THER/PROPH/DIAG INJ SC/IM: CPT

## 2023-04-12 PROCEDURE — 97161 PT EVAL LOW COMPLEX 20 MIN: CPT

## 2023-04-12 PROCEDURE — 92522 EVALUATE SPEECH PRODUCTION: CPT

## 2023-04-12 PROCEDURE — 25010000002 ENOXAPARIN PER 10 MG: Performed by: INTERNAL MEDICINE

## 2023-04-12 PROCEDURE — 70551 MRI BRAIN STEM W/O DYE: CPT

## 2023-04-12 PROCEDURE — 92610 EVALUATE SWALLOWING FUNCTION: CPT

## 2023-04-12 PROCEDURE — 82962 GLUCOSE BLOOD TEST: CPT

## 2023-04-12 PROCEDURE — 97165 OT EVAL LOW COMPLEX 30 MIN: CPT

## 2023-04-12 RX ORDER — CLOPIDOGREL BISULFATE 75 MG/1
75 TABLET ORAL DAILY
Status: DISCONTINUED | OUTPATIENT
Start: 2023-04-12 | End: 2023-04-13 | Stop reason: HOSPADM

## 2023-04-12 RX ORDER — ATORVASTATIN CALCIUM 40 MG/1
80 TABLET, FILM COATED ORAL NIGHTLY
Status: DISCONTINUED | OUTPATIENT
Start: 2023-04-12 | End: 2023-04-13 | Stop reason: HOSPADM

## 2023-04-12 RX ORDER — CARVEDILOL 25 MG/1
25 TABLET ORAL 2 TIMES DAILY
Status: DISCONTINUED | OUTPATIENT
Start: 2023-04-12 | End: 2023-04-13 | Stop reason: HOSPADM

## 2023-04-12 RX ORDER — FUROSEMIDE 20 MG/1
20 TABLET ORAL DAILY
Status: DISCONTINUED | OUTPATIENT
Start: 2023-04-12 | End: 2023-04-13 | Stop reason: HOSPADM

## 2023-04-12 RX ORDER — SPIRONOLACTONE 25 MG/1
25 TABLET ORAL DAILY
Status: DISCONTINUED | OUTPATIENT
Start: 2023-04-12 | End: 2023-04-13 | Stop reason: HOSPADM

## 2023-04-12 RX ORDER — ENOXAPARIN SODIUM 100 MG/ML
40 INJECTION SUBCUTANEOUS DAILY
Status: DISCONTINUED | OUTPATIENT
Start: 2023-04-12 | End: 2023-04-13 | Stop reason: HOSPADM

## 2023-04-12 RX ORDER — ASPIRIN 81 MG/1
81 TABLET, CHEWABLE ORAL DAILY
Status: DISCONTINUED | OUTPATIENT
Start: 2023-04-12 | End: 2023-04-13 | Stop reason: HOSPADM

## 2023-04-12 RX ADMIN — SACUBITRIL AND VALSARTAN 1 TABLET: 97; 103 TABLET, FILM COATED ORAL at 22:34

## 2023-04-12 RX ADMIN — CARVEDILOL 25 MG: 25 TABLET, FILM COATED ORAL at 09:02

## 2023-04-12 RX ADMIN — ASPIRIN 81 MG: 81 TABLET, CHEWABLE ORAL at 09:02

## 2023-04-12 RX ADMIN — CARVEDILOL 25 MG: 25 TABLET, FILM COATED ORAL at 22:36

## 2023-04-12 RX ADMIN — SACUBITRIL AND VALSARTAN 1 TABLET: 97; 103 TABLET, FILM COATED ORAL at 09:02

## 2023-04-12 RX ADMIN — FUROSEMIDE 20 MG: 20 TABLET ORAL at 09:02

## 2023-04-12 RX ADMIN — ENOXAPARIN SODIUM 40 MG: 100 INJECTION SUBCUTANEOUS at 10:10

## 2023-04-12 RX ADMIN — SPIRONOLACTONE 25 MG: 25 TABLET ORAL at 09:02

## 2023-04-12 RX ADMIN — CLOPIDOGREL BISULFATE 75 MG: 75 TABLET ORAL at 09:02

## 2023-04-12 RX ADMIN — Medication 10 ML: at 09:02

## 2023-04-12 RX ADMIN — Medication 10 ML: at 22:36

## 2023-04-12 RX ADMIN — ATORVASTATIN CALCIUM 80 MG: 40 TABLET, FILM COATED ORAL at 21:49

## 2023-04-12 NOTE — THERAPY EVALUATION
Patient Name: Demetrio Mariano  : 1970    MRN: [id number]                              Today's Date: 2023       Admit Date: 2023    Visit Dx:     ICD-10-CM ICD-9-CM   1. Cerebrovascular accident (CVA), unspecified mechanism  I63.9 434.91   2. Oropharyngeal dysphagia  R13.12 787.22   3. Dysarthria  R47.1 784.51   4. 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   • Nicotine dependence   • Acute CVA (cerebrovascular accident)   • Stroke     Past Medical History:   Diagnosis Date   • CHF (congestive heart failure)    • Coronary artery disease involving native heart without angina pectoris      Nonobstructive coronary artery disease involving diagonal branch per cardiac catheterization done on 2021   • Essential hypertension, benign    • Hypercholesterolemia    • ICD (implantable cardioverter-defibrillator), dual, in situ 2021   • Nicotine dependence    • Non-ischemic cardiomyopathy (CMS/HCC) 2021    Mildly dilated left ventricular cavity with severe global hypokinesis with estimated LV ejection fraction of       28%. 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    • Type I diabetes mellitus, uncontrolled      Past Surgical History:   Procedure Laterality Date   • CARDIAC CATHETERIZATION     • CARDIAC ELECTROPHYSIOLOGY PROCEDURE N/A 06/15/2021    Procedure: ICD SC new;  Surgeon: Regulo Coyne MD;  Location: [facility] INVASIVE LOCATION;  Service: Cardiovascular;  Laterality: N/A;   • FRACTURE SURGERY Right      crushed heel injury with 14 fractures   • OTHER SURGICAL HISTORY  06/10/2013    plication for treatment of pyronie's disease   • VASECTOMY        General Information     Row Name 04/12/23 1352          OT Time and Intention    Document Type evaluation  -ES     Mode of Treatment individual therapy;occupational therapy  -ES     Row Name 04/12/23 1352          General Information    Patient Profile Reviewed yes  -ES     Prior Level of Function independent:;ADL's;all household mobility;community mobility  Patient independent with B and IADLs at baseline. Patient is employed. No device for functional mobility. Standing shower completion. Archer in stance. No home O2 use. Denies recent falls.  -ES     Existing Precautions/Restrictions fall  -ES     Barriers to Rehab previous functional deficit  multipe prior CVAs  -ES     Row Name 04/12/23 1352          Occupational Profile    Reason for Services/Referral (Occupational Profile) Patient is 52 yr old male admitted [facility]/11/2023 with reports of aphasia, dysarthria, right sided weakness. Patient PMH significant for multiple prior CVAs. OT evaluation and treatment ordered for stroke workup and discharge planning recommendations. No previous OT services for current condition.  -ES     Row Name 04/12/23 1352          Living Environment    People in Home child(terry), adult;spouse  -ES     Row Name 04/12/23 1352          Home Main Entrance    Number of Stairs, Main Entrance two  -ES     Row Name 04/12/23 1352          Stairs Within Home, Primary    Number of Stairs, Within Home, Primary none  -ES     Row Name 04/12/23 1352          Cognition    Orientation Status (Cognition) oriented x 3  Patient pleasant and cooperative, agreeable to therapy evaluation  -ES     Row Name 04/12/23 1352          Safety Issues, Functional Mobility    Impairments Affecting Function (Mobility) balance;coordination;grasp;strength;sensation/sensory awareness  -ES           User Key   (r) = Recorded By, (t) = Taken By, (c) = Cosigned By    Initials Name Provider Type    Klaudia Booker, OTR/L, CSRS Occupational Therapist                 Mobility/ADL's     Row Name 04/12/23 1358          Bed Mobility    Bed Mobility supine-sit;sit-supine  -ES     Supine-Sit [place] (Bed Mobility) standby assist  -ES     Sit-Supine [place] (Bed Mobility) standby assist  -ES     Row Name 04/12/23 1358          Transfers    Transfers sit-stand transfer;stand-sit transfer  -ES     Row Name 04/12/23 [facility]8          Sit-Stand Transfer    Sit-Stand [place] (Transfers) contact guard;1 person assist  -ES     Assistive Device (Sit-Stand Transfers) walker, front-wheeled  -ES     Row Name 04/12/23 135          Stand-Sit Transfer    Stand-Sit [place] (Transfers) contact guard;1 person assist  -ES     Assistive Device (Stand-Sit Transfers) walker, front-wheeled  -ES     Row Name 04/12/23 [facility]          Functional Mobility    Functional Mobility- Ind. Level contact guard assist;1 person  -ES     Functional Mobility- Device walker, front-wheeled  -ES     Functional Mobility- Comment patient performs functional mobility to/from bathroom, CGA with use of rolling walker. Patient attempts to abandon rolling walker with mobility, however presents with right lower extremity strength and coordination deficits that impede patient safety with mobility, increasing patient fall risk. Educated on use of rolling walker for patient safety.  -     Row Name 04/12/23 1358          Activities of Daily Living    BADL Assessment/Intervention bathing;upper body dressing;lower body dressing;grooming;feeding;toileting  -     Row Name 04/12/23 [facility]8          Bathing Assessment/Intervention    [place] Level (Bathing) bathing skills;contact guard assist  -     Row Name 04/12/23 [facility]          Upper Body Dressing Assessment/Training    [place] Level (Upper Body Dressing) upper body dressing skills;set up;standby assist  -ES      Row Name 04/12/23 1358          Lower Body Dressing Assessment/Training    [place] Level (Lower Body Dressing) lower body dressing skills;contact guard assist  -ES     Row Name 04/12/23 1358          Grooming Assessment/Training    [place] Level (Grooming) grooming skills;set up;standby assist  -ES     Row Name 04/12/23 1358          Self-Feeding Assessment/Training    [place] Level (Feeding) feeding skills;set up  -ES     Row Name 04/12/23 1358          Toileting Assessment/Training    [place] Level (Toileting) toileting skills;standby assist  -ES           User Key  (r) = Recorded By, (t) = Taken By, (c) = Cosigned By    Initials Name Provider Type    ES Klaudia Norwood, OTR/L, CSRS Occupational Therapist               Obj/Interventions     [facility] Name 04/12/23 1402          Sensory Assessment (Somatosensory)    Sensory Subjective Reports numbness;tingling  -     Sensory Assessment Patient reports impaired sensation to light touch right upper and lower extremities. Patient endorses generalized numbness on right side  -ES     Row Name 04/12/23 1402          Vision Assessment/Intervention    Visual Impairment/Limitations WFL  -     Vision Assessment Comment patient declines visual changes. Quick vision screen WNL  -ES     [facility] Name 04/12/23 1402          Range of Motion Comprehensive    General Range of Motion bilateral upper extremity ROM WNL  -[facility] Name 04/12/23 1402          Strength Comprehensive (MMT)    General Manual Muscle Testing (MMT) Assessment upper extremity strength deficits identified;lower extremity strength deficits identified  -ES     Comment, General Manual Muscle Testing (MMT) Assessment left upper extremity assessed 4+/5. Right deltoid 4/5, bicep/triceps 3+/5, wrist 3+/5, grasp 3/5 with right UE strength deficits noted.  -ES     Row Name 04/12/23 1402          Lower Extremity (Manual Muscle Testing)    Comment, MMT: Lower Extremity patient demonstrates right lower  extremity strength deficits at time of evaluation  -ES     Row Name 04/12/23 1402          Motor Skills    Motor Skills coordination;functional endurance  -ES     Coordination right;upper extremity;lower extremity  Patient demonstrates right upper and lower extremity coordination deficits at time of evaluation. Right digit opposition with minimal impairment. Right lower extremity coordination deficit noted with toe tapping task.  -ES     Functional Endurance fair  -ES     Row Name 04/12/23 1402          Balance    Balance Assessment sitting dynamic balance;standing dynamic balance  -ES     Dynamic Sitting Balance standby assist  -ES     Position, Sitting Balance unsupported;sitting edge of bed  -ES     Dynamic Standing Balance contact guard;1-person assist  -ES     Position/Device Used, Standing Balance supported;walker, front-wheeled  -ES     Comment, Balance patient presents with decreased balance with functional mobility, utilize rolling walker to increase patient safety  -ES           User Key  (r) = Recorded By, (t) = Taken By, (c) = Cosigned By    Initials Name Provider Type    ES Klaudia Norwood, OTR/L, CSRS Occupational Therapist               Goals/Plan     Row Name 04/12/23 1412          Transfer Goal 1 (OT)    Activity/Assistive Device (Transfer Goal 1, OT) transfers, all  -ES     [place] Level/Cues Needed (Transfer Goal 1, OT) independent  -ES     Time Frame (Transfer Goal 1, OT) long term goal (LTG);10 days  -ES     Row Name 04/12/23 1412          Bathing Goal 1 (OT)    Activity/Device (Bathing Goal 1, OT) bathing skills, all  -ES     [place] Level/Cues Needed (Bathing Goal 1, OT) independent  -ES     Time Frame (Bathing Goal 1, OT) long term goal (LTG);10 days  -ES     Row Name 04/12/23 1412          Dressing Goal 1 (OT)    Activity/Device (Dressing Goal 1, OT) dressing skills, all  -ES     [place]/Cues Needed (Dressing Goal 1, OT) independent  -ES     Time Frame (Dressing Goal 1, OT) long  "term goal (LTG);10 days  -ES     Row Name 04/12/23 1412          Toileting Goal 1 (OT)    Activity/Device (Toileting Goal 1, OT) toileting skills, all  -ES     [place] Level/Cues Needed (Toileting Goal 1, OT) independent  -ES     Time Frame (Toileting Goal 1, OT) long term goal (LTG);10 days  -ES     Row Name 04/12/23 1412          Grooming Goal 1 (OT)    Activity/Device (Grooming Goal 1, OT) grooming skills, all  -ES     [place] (Grooming Goal 1, OT) independent  -ES     Time Frame (Grooming Goal 1, OT) long term goal (LTG);10 days  -ES     Row Name 04/12/23 1412          Strength Goal 1 (OT)    Strength Goal 1 (OT) Pt will increase RUE strength 1 muscle grade for increased UE strength required for ADL transfers and task completion  -ES     Time Frame (Strength Goal 1, OT) long term goal (LTG);10 days  -ES     Row Name 04/12/23 1412          Problem Specific Goal 1 (OT)    Problem Specific Goal 1 (OT) Patient will demonstrate \"good\" graded dynamic standing balance with functional task engagement in preperation for independent ADL routine completion at time of discharge  -ES     Time Frame (Problem Specific Goal 1, OT) long term goal (LTG);10 days  -ES     Row Name 04/12/23 1412          Therapy Assessment/Plan (OT)    Planned Therapy Interventions (OT) activity tolerance training;BADL retraining;functional balance retraining;occupation/activity based interventions;patient/caregiver education/training;ROM/therapeutic exercise;strengthening exercise;transfer/mobility retraining  -ES           User Key  (r) = Recorded By, (t) = Taken By, (c) = Cosigned By    Initials Name Provider Type    Klaudia Booker, COLE/L, CSRS Occupational Therapist               Clinical Impression     Row Name 04/12/23 1408          Plan of Care Review    Plan of Care Reviewed With patient  -ES     Progress no change  initial evaluation encounter  -ES     Outcome Evaluation Patient has experienced decline in function from baseline " status, presenting w/ deficits related to coordination, strength, sensation, transfers and mobility that impede patient independence with activities of daily living.  Patient would benefit from skilled Occupational Therapy intervention to maxamize patient safety, and promote return to baseline independence.  -ES     Row Name 04/12/23 1409          Therapy Assessment/Plan (OT)    Rehab Potential (OT) good, to achieve stated therapy goals  -ES     Criteria for Skilled Therapeutic Interventions Met (OT) yes;meets criteria;skilled treatment is necessary  -ES     Therapy Frequency (OT) 5 times/wk  -ES     Row Name 04/12/23 1409          Therapy Plan Review/Discharge Plan (OT)    Anticipated Discharge Disposition (OT) home with outpatient therapy services  -ES     Row Name 04/12/23 1409          Vital Signs    O2 Delivery Pre Treatment room air  -ES     O2 Delivery Intra Treatment room air  -ES     O2 Delivery Post Treatment room air  -ES     Row Name 04/12/23 1409          Positioning and Restraints    Pre-Treatment Position in bed  -ES     Post Treatment Position bed  -ES           User Key  (r) = Recorded By, (t) = Taken By, (c) = Cosigned By    Initials Name Provider Type    ES Klaudia Norwood, OTR/L, CSRS Occupational Therapist               Outcome Measures     Row Name 04/12/23 1413          How much help from another is currently needed...    Putting on and taking off regular lower body clothing? 3  -ES     Bathing (including washing, rinsing, and drying) 3  -ES     Toileting (which includes using toilet bed pan or urinal) 3  -ES     Putting on and taking off regular upper body clothing 4  -ES     Taking care of personal grooming (such as brushing teeth) 4  -ES     Eating meals 4  -ES     AM-PAC 6 Clicks Score (OT) 21  -ES     Row Name 04/12/23 1413          Functional Assessment    Outcome Measure Options AM-PAC 6 Clicks Daily Activity (OT);Optimal Instrument  -ES     Row Name 04/12/23 1413          Optimal  Instrument    Optimal Instrument Optimal - 3  -ES     Bending/Stooping 2  -ES     Standing 2  -ES     Reaching 2  -ES     From the list, choose the 3 activities you would most like to be able to do without any difficulty Bending/stooping;Standing;Reaching  -ES     Total Score Optimal - 3 6  -ES           User Key  (r) = Recorded By, (t) = Taken By, (c) = Cosigned By    Initials Name Provider Type    Klaudia Booker, OTR/L, KRISTIS Occupational Therapist                  OT Recommendation and Plan  Planned Therapy Interventions (OT): activity tolerance training, BADL retraining, functional balance retraining, occupation/activity based interventions, patient/caregiver education/training, ROM/therapeutic exercise, strengthening exercise, transfer/mobility retraining  Therapy Frequency (OT): 5 times/wk  Plan of Care Review  Plan of Care Reviewed With: patient  Progress: no change (initial evaluation encounter)  Outcome Evaluation: Patient has experienced decline in function from baseline status, presenting w/ deficits related to coordination, strength, sensation, transfers and mobility that impede patient independence with activities of daily living.  Patient would benefit from skilled Occupational Therapy intervention to maxamize patient safety, and promote return to baseline independence.     Time Calculation:    Time Calculation- OT     Row Name 04/12/23 1415             Time Calculation- OT    OT Received On 04/12/23  -ES      OT Goal Re-Cert Due Date 04/21/23  -ES         Untimed Charges    OT Eval/Re-eval Minutes 36  -ES         Total Minutes    Untimed Charges Total Minutes 36  -ES       Total Minutes 36  -ES            User Key  (r) = Recorded By, (t) = Taken By, (c) = Cosigned By    Initials Name Provider Type    Klaudia Booker, OTR/L, KRISTIS Occupational Therapist              Therapy Charges for Today     Code Description Service Date Service Provider Modifiers Qty    [id number]  OT EVAL LOW COMPLEXITY 3  4/12/2023 Klaudia Norwood, OTR/L, CSRS  1               COLE Taylor/L, CSRS  4/12/2023

## 2023-04-12 NOTE — CASE MANAGEMENT/SOCIAL WORK
Discharge Planning Assessment   He     Patient Name: Demetrio Mariano  MRN: [id number]  Today's Date: 4/12/2023    Admit Date: 4/11/2023    Plan: Patient does c/o right sided weakness at this time   Discharge Needs Assessment     Row Name 04/12/23 1305       Discharge Needs Assessment    Equipment Currently Used at Home medication pump    Row Name 04/12/23 1304       Living Environment    People in Home child(terry), adult;spouse    Current Living Arrangements home    Primary Care Provided by self    Family Caregiver if Needed spouse    Quality of Family Relationships helpful;involved;supportive    Able to Return to Prior Arrangements yes       Resource/Environmental Concerns    Resource/Environmental Concerns none    Transportation Concerns none       Transition Planning    Patient/Family Anticipates Transition to home with family    Patient/Family Anticipated Services at Transition none    Transportation Anticipated family or friend will provide       Discharge Needs Assessment    Readmission Within the Last 30 Days no previous admission in last 30 days    Equipment Currently Used at Home none    Concerns to be Addressed no discharge needs identified    Anticipated Changes Related to Illness none    Equipment Needed After Discharge none    Provided Post Acute Provider List? N/A    Provided Post Acute Provider Quality & Resource List? N/A               Discharge Plan     Row Name 04/12/23 1311       Plan    Plan Patient does c/o right sided weakness at this time    Row Name 04/12/23 1305       Plan    Plan Patient admitted due to dysarthria following recent cva. Patient was recently discharged from [facility]. Patient notes he did  his new medications, no new diet was instructed, had no further concerns related to rpevious discharge instructions, wife was present at the bedside and noted she did not have concerns at this time and read over the discharge instructions. Patient verified the PCP and pharmacy.  Patient is independent at home with ADLs, currently still working FT. Does report difficulty speaking and forming sentences, at time of assessment patient did show signs of slurred speach and word forming difficulty. Patient plans to return home at discharge, family will provide transportation home.    Final Discharge Disposition Code 01 - home or self-care              Continued Care and Services - Admitted Since 4/11/2023    Coordination has not been started for this encounter.       Expected Discharge Date and Time     Expected Discharge Date Expected Discharge Time    Apr 13, 2023          Demographic Summary     Row Name 04/12/23 1303       General Information    Admission Type observation    Arrived From home;emergency department    Referral Source admission list    Reason for Consult discharge planning    Preferred Language English       Contact Information    Permission Granted to Share Info With family/designee    Contact Information Obtained for                Functional Status     Row Name 04/12/23 1303       Functional Status    Usual Activity Tolerance good    Current Activity Tolerance good       Assessment of Health Literacy    How often do you have someone help you read hospital materials? Occasionally    How often do you have problems learning about your medical condition because of difficulty understanding written information? Never    How often do you have a problem understanding what is told to you about your medical condition? Never    How confident are you filling out medical forms by yourself? Quite a bit    Health Literacy Good       Functional Status, IADL    Medications independent    Meal Preparation independent    Housekeeping independent    Laundry independent    Shopping independent       Mental Status    General Appearance WDL WDL       Mental Status Summary    Recent Changes in Mental Status/Cognitive Functioning no changes       Employment/    Employment Status  employed full-time    Shift Worked first shift    Current or Previous Occupation factory work               Psychosocial    No documentation.                Abuse/Neglect    No documentation.                Legal    No documentation.                Substance Abuse    No documentation.                Patient Forms    No documentation.                   Lola Burris RN

## 2023-04-12 NOTE — OUTREACH NOTE
Stroke Week 2 Survey    Flowsheet Row Responses   [facility] facility patient discharged from? He   Does the patient have one of the following disease processes/diagnoses(primary or secondary)? Stroke   Week 2 attempt successful? No   Unsuccessful attempts Attempt 1   Revoke Readmitted          Krissy BAHENA - Registered Nurse

## 2023-04-12 NOTE — PLAN OF CARE
Goal Outcome Evaluation:  Plan of Care Reviewed With: patient, spouse        Progress: no change  Outcome Evaluation: Patient stable this shift without significant changes. MRI completed this afternoon, waiting for neurology consult.

## 2023-04-12 NOTE — THERAPY EVALUATION
Acute Care - Speech Language Pathology   Swallow Initial Evaluation  Neri     Patient Name: Demetrio Mariano  : 1970  MRN: [id number]  Today's Date: 2023               Admit Date: 2023    Visit Dx:     ICD-10-CM ICD-9-CM   1. Cerebrovascular accident (CVA), unspecified mechanism  I63.9 434.91   2. Oropharyngeal dysphagia  R13.12 787.22   3. 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   • Nicotine dependence   • Acute CVA (cerebrovascular accident)   • Stroke     Past Medical History:   Diagnosis Date   • CHF (congestive heart failure)    • Coronary artery disease involving native heart without angina pectoris      Nonobstructive coronary artery disease involving diagonal branch per cardiac catheterization done on 2021   • Essential hypertension, benign    • Hypercholesterolemia    • ICD (implantable cardioverter-defibrillator), dual, in situ 2021   • Nicotine dependence    • Non-ischemic cardiomyopathy (CMS/HCC) 2021    Mildly dilated left ventricular cavity with severe global hypokinesis with estimated LV ejection fraction of       28%. 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    • Type I diabetes mellitus, uncontrolled      Past Surgical History:   Procedure Laterality Date   • CARDIAC CATHETERIZATION     • CARDIAC ELECTROPHYSIOLOGY PROCEDURE N/A 06/15/2021    Procedure: ICD SC new;  Surgeon: Regulo Coyne MD;  Location: [facility] INVASIVE LOCATION;  Service: Cardiovascular;  Laterality: N/A;   • FRACTURE SURGERY Right      crushed heel injury with 14 fractures   • OTHER SURGICAL HISTORY  06/10/2013    plication for treatment of pyronie's disease   • VASECTOMY         Inpatient Speech Pathology Dysphagia Evaluation         PAIN SCALE: None indicated.     PRECAUTIONS/CONTRAINDICATIONS: Standard     SUSPECTED ABUSE/NEGLECT/EXPLOITATION: None indicated.     SOCIAL/PSYCHOLOGICAL NEEDS/BARRIERS: None indicated.     PAST SOCIAL HISTORY: 52-year-old male lives at home with his wife     PRIOR FUNCTION: Independent, mild dysarthria     PATIENT GOALS/EXPECTATIONS: Return home, return to work     HISTORY: 52-year-old male with the above diagnosis referred for speech therapy evaluation due to stroke.  Per report patient had mild residual dysarthria from previous stroke with speech further affected at this time.    Patient recently evaluated by speech pathology services due to dysarthria with home exercise program given.  Patient states utilizing home exercise program.  Currently with increased difficulty with slurring of speech.    CURRENT DIET LEVEL: Regular     OBJECTIVE:    TEST ADMINISTERED: Clinical dysphagia evaluation    BEHAVIORAL OBSERVATIONS: Alert and cooperative     COGNITION/SAFETY AWARENESS: Appears intact     ORAL MOTOR EXAM: Mild decreased facial strength noted to the right 4 -/5.  Minimal lingual deviation on protrusion.    VOICE QUALITY: Adequate    REFLEX EXAM: Patient demonstrated throat clear    POSTURE: Sitting upright in bed    FEEDING/SWALLOWING FUNCTION: Assessed with  thin liquids, puréed solids, crunchy solid.    CLINICAL OBSERVATIONS:   Thin liquid by cup and by straw with delay, vocal quality remaining clear to cervical auscultation.  Purée solid with swallow completed with laryngeal elevation noted to palpation.  Crunchy solid with adequate chewing followed by swallow completed clearing the oral cavity.    DYSPHAGIA CRITERIA: Swallow delay noted.  No overt clinical signs or symptoms of aspiration noted at the  bedside.    FUNCTIONAL ASSESSMENT INSTRUMENT: Patient currently scored a level 7 of 7 on Functional Communication Measures for swallowing indicating a 0% limitation in function.    ASSESSMENT/ PLAN OF CARE:  No direct speech therapy is recommended at this time for dysphagia. Recommend rereferral should patient demonstrate change in status.  Monitor for signs or symptoms of aspiration.    RECOMMENDATIONS:   1.   DIET: Regular solids cut small, thin liquid.    2.  POSITION: Positioning fully upright for all p.o. intake and 30 minutes following.    3.  COMPENSATORY STRATEGIES: Alternate small bites and small sips of solids and liquids at a slow rate.  Limit distractions.    Pt/responsible party agrees with plan of care and has been informed of all alternatives, risks and benefits.                            Anticipated Discharge Disposition (SLP): home with assist (04/12/23 1020)                                                            EDUCATION  The patient has been educated in the following areas:   Modified Diet Instruction.              Time Calculation:    Time Calculation- SLP     Row Name 04/12/23 1020             Time Calculation- SLP    SLP Start Time 0700  -TB      SLP Stop Time 0835  -TB      SLP Time Calculation (min) 95 min  -TB      SLP Received On 04/12/23  -TB         Untimed Charges    SLP Eval/Re-eval  ST Eval Speech Sound Production - 83499;ST Eval Oral Pharyng Swallow - 12825  -TB      42544-QV Eval Speech Sound Production Minutes 40  -TB      04277-BO Eval Oral Pharyng Swallow Minutes 55  -TB         Total Minutes    Untimed Charges Total Minutes 95  -TB       Total Minutes 95  -TB            User Key  (r) = Recorded By, (t) = Taken By, (c) = Cosigned By    Initials Name Provider Type    TB Lynda Webster SLP Speech and Language Pathologist                Therapy Charges for Today     Code Description Service Date Service Provider Modifiers Qty    [id number]  ST EVAL ORAL PHARYNG SWALLOW 4  4/12/2023 Lynda Webster, SLP GN 1    [id number] [facility] SPEECH SOUND PRODUCTION 3 4/12/2023 Lynda Webster SLP GN 1               ISAURA Trevino  4/12/2023

## 2023-04-12 NOTE — NURSING NOTE
This nurse was notified that pt's blood pressures were more soft than earlier on shift, BP was 97/51 with a MAP of 60, previous BP at 2322 was 128/69 with MAP of 81, patient was also asymptomatic, hospitalist on call notified by phone, no new orders received, will monitor.

## 2023-04-12 NOTE — NURSING NOTE
This nurse was notified that patient had a working insulin pump, pt was asked and said that he had it at last admission and refused to have it removed, Dr Toney was made aware and patient told him that he did not want to take it off, MD said to document that patient refused to have insulin pump removed, pt was notified that nursing has to be able to monitor his blood sugars regularly and he said he was fine with that. Charge nurse was notified that pt had insulin pump and that he was not willing to have it removed.

## 2023-04-12 NOTE — PROGRESS NOTES
[facility]   Hospitalist Progress Note  Date: 2023  Patient Name: Demetrio Mariano  : 1970  MRN: [id number]  Date of admission: 2023  Consultants:   -Neurology    Subjective   Subjective     Chief Complaint: Slurred speech and gait unsteadiness    Summary:   Demetrio Mariano is a 52 y.o. male with type 1 diabetes mellitus on insulin pump, heart failure with reduced ejection fraction, history of right pontine ischemic stroke (2023) and history of recent left basal ganglia stroke (2020) who presented to the ED with new onset aphasia and dysarthria.  CTA head and neck did not show any large vessel occlusion, significant stenosis or aneurysm.  Teleneurology evaluated in ED and recommended patient be admitted for further stroke work-up.    Interval Followup:   No acute events overnight.  Patient still with some slurred speech but he feels like it has improved some.  Patient also notes continued word finding difficulty.  Denies any chest pain or shortness of breath.    Review of Systems   All systems reviewed and negative unless stated otherwise under subjective.    Objective   Objective     Vitals:   Temp:  [97.3 °F (36.3 °C)-98.8 °F (37.1 °C)] 98 °F (36.7 °C)  Heart Rate:  [65-91] 79  Resp:  [12-18] 18  BP: ()/(41-84) 96/57  Physical Exam   Gen: No acute distress, Conversant, Pleasant, sitting up in bed eating breakfast  Resp: CTAB, No w/r/r, No respiratory distress appreciated  Card: RRR, No m/r/g  Abd: Soft, Nontender, Nondistended, + bowel sounds      Result Review    Result Review:  I have personally reviewed the results as below and agree with these findings:  []  Laboratory:   CMP        2023    22:33 2023    11:18 2023    13:03   CMP   Glucose 166   253   189     BUN 15   12   20     Creatinine 0.87   0.73   0.78     EGFR 104.5   110.2   108.0     Sodium 140   138   137     Potassium 3.8   3.7   4.5     Chloride 104   104   100     Calcium 9.3   8.7   9.2      Total Protein 6.8   6.3   7.2     Albumin 4.3   3.9   4.6     Globulin 2.5   2.4   2.6     Total Bilirubin 0.6   0.8   0.7     Alkaline Phosphatase 103   97   109     AST (SGOT) 14   14   20     ALT (SGPT) 19   16   33     Albumin/Globulin Ratio 1.7   1.6   1.8     BUN/Creatinine Ratio 17.2   16.4   25.6     Anion Gap 9.6   8.5   12.7       CBC        4/5/2023    22:33 4/6/2023    11:18 4/11/2023    13:03   CBC   WBC 10.36   7.34   11.68     RBC 4.01   3.90   4.14     Hemoglobin 12.6   12.4   13.2     Hematocrit 35.7   34.3   37.0     MCV 89.0   87.9   89.4     MCH 31.4   31.8   31.9     MCHC 35.3   36.2   35.7     RDW 13.1   13.2   13.2     Platelets 297   268   310        Hypoglycemia noted on a.m. labs.    []  Microbiology:   [x]  Radiology: CTA head and neck imaging reviewed.  No evidence of large vessel occlusion identified.  CXR imaging reviewed.  No acute infiltrate noted.  No pleural effusion or pneumothorax appreciated.  []  EKG/Telemetry:    []  Cardiology/Vascular:    []  Pathology:  []  Old records:  []  Other:    Assessment & Plan   Assessment / Plan     Assessment:  Concern for new/worsening stroke  Recent history of left basal ganglia stroke (04/05/2023)  Recent history of right pontine ischemic stroke (01/2023)  Heart failure with reduced ejection fraction, not acutely exacerbated  Essential hypertension  Hyperlipidemia  Nonischemic cardiomyopathy s/p ICD implantation  Type 1 diabetes mellitus, poorly controlled  Chronic ongoing tobacco abuse with cigarettes    Plan:  -Neurology consulted and following, appreciate assistance and recommendations in the care of this patient.  -MRI brain ordered, follow-up results  -Echocardiogram ordered, follow-up results  -Continue telemetry monitoring and neurochecks per protocol  -Continue aspirin, statin and Plavix  -Continue home carvedilol, Entresto and spironolactone  -Patient may use home insulin pump.  Diabetes nurse educator consulted.  -Nicotine patches  ordered  -PT/OT/speech therapy consulted  -Patient examined and chart reviewed.  Care plan discussed with patient and nursing and consultants.  At this time patient does not require escalation in level of care.  -Will monitor electrolytes and renal function with BMP and magnesium level in the AM  -Will monitor WBC and Hgb with CBC in the AM  -Clinical course will dictate further management     DVT Prophylaxis: Lovenox  Diet: Diabetic  Dispo: PT/OT consult  Code Status: DNR/DNI     Personally reviewed patients labs and imaging, discussed with patient and nurse at bedside. Discussed case with the following consultants: Neurology.     Part of this note may be an electronic transcription/translation of spoken language to printed text using the Dragon dictation system.    DVT prophylaxis:  Mechanical DVT prophylaxis orders are present.    CODE STATUS:   Medical Intervention Limits: NO intubation (DNI)  Level Of Support Discussed With: Patient  Code Status (Patient has no pulse and is not breathing): No CPR (Do Not Attempt to Resuscitate)  Medical Interventions (Patient has pulse or is breathing): Limited Support  Release to patient: Routine Release        Electronically signed by He Blood MD, 04/12/23, 8:57 AM EDT.

## 2023-04-12 NOTE — PLAN OF CARE
Goal Outcome Evaluation:    Pt newly admitted for recent stroke, alert and oriented, NIH 2 this shift for mildly decreased sensation to right upper and lower extremities, Echo completed this shift, pending results, MRI delayed and will be done today due to patient having internal defibrillator and unable to contact [employer] to place in stand-by mode, accuchecks ordered q 6hr, will monitor.

## 2023-04-12 NOTE — PLAN OF CARE
Goal Outcome Evaluation:  Plan of Care Reviewed With: patient         ASSESSMENT/ PLAN OF CARE:  Pt presents with limitations, noted below, that impede his ability to communicate effectively. The skills of a therapist will be required to safely and effectively implement the following treatment plan to restore maximal level of function.    PROBLEMS:  1. Decreased intelligibility, decreased oral motor strength/coordination,  decreased voice quality with decreased respiratory support for phonation.      TREATMENT: Speech therapy to address speech production through exercise program.    FREQUENCY/DURATION: Once daily 5 times per week    REHAB POTENTIAL:  Pt has good rehab potential.  The following limitations may influence improvement/ length of tx: Medical status.

## 2023-04-12 NOTE — PLAN OF CARE
Goal Outcome Evaluation:  Plan of Care Reviewed With: patient        Progress: no change (initial evaluation encounter)  Outcome Evaluation: Patient has experienced decline in function from baseline status, presenting w/ deficits related to coordination, strength, sensation, transfers and mobility that impede patient independence with activities of daily living.  Patient would benefit from skilled Occupational Therapy intervention to maxamize patient safety, and promote return to baseline independence.    Recommend: Outpatient Occupational Therapy

## 2023-04-12 NOTE — THERAPY EVALUATION
Acute Care - Speech Language Pathology Initial Evaluation   Neri     Patient Name: Demetrio Mariano  : 1970  MRN: [id number]  Today's Date: 2023               Admit Date: 2023     Visit Dx:    ICD-10-CM ICD-9-CM   1. Cerebrovascular accident (CVA), unspecified mechanism  I63.9 434.91   2. Oropharyngeal dysphagia  R13.12 787.22   3. 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   • Nicotine dependence   • Acute CVA (cerebrovascular accident)   • Stroke     Past Medical History:   Diagnosis Date   • CHF (congestive heart failure)    • Coronary artery disease involving native heart without angina pectoris      Nonobstructive coronary artery disease involving diagonal branch per cardiac catheterization done on 2021   • Essential hypertension, benign    • Hypercholesterolemia    • ICD (implantable cardioverter-defibrillator), dual, in situ 2021   • Nicotine dependence    • Non-ischemic cardiomyopathy (CMS/HCC) 2021    Mildly dilated left ventricular cavity with severe global hypokinesis with estimated LV ejection fraction of       28%. 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    • Type I diabetes mellitus, uncontrolled      Past Surgical History:   Procedure Laterality Date   • CARDIAC CATHETERIZATION     • CARDIAC ELECTROPHYSIOLOGY PROCEDURE N/A 06/15/2021    Procedure: ICD SC new;  Surgeon: Regulo Coyne MD;  Location: [facility] INVASIVE LOCATION;  Service: Cardiovascular;  Laterality: N/A;   • FRACTURE SURGERY Right     crushed heel  injury with 14 fractures   • OTHER SURGICAL HISTORY  06/10/2013    plication for treatment of pyronie's disease   • VASECTOMY            INPATIENT SPEECH EVALUATION        DATE OF SERVICE: 4/12/23     REHAB DIAGNOSIS: Dysarthria     PAIN SCALE: None indicated.     PRECAUTIONS/CONTRAINDICATIONS: Standard     SUSPECTED ABUSE/NEGLECT/EXPLOITATION: None indicated.     SOCIAL/PSYCHOLOGICAL NEEDS/BARRIERS: None indicated.     PAST SOCIAL HISTORY: 52-year-old male lives at home with his wife     PRIOR FUNCTION: Independent     PATIENT GOALS/EXPECTATIONS: Return home, return to work     HISTORY: 52-year-old male with the above diagnosis referred for speech therapy evaluation due to stroke.  Per report patient had mild residual dysarthria from previous stroke with speech further affected at this time.  Patient recently evaluated by speech pathology services due to dysarthria with home exercise program given.  Patient states utilizing home exercise program.  Currently with increased difficulty with slurring of speech.  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     CURRENT DIET LEVEL: Regular     OBJECTIVE:  BEHAVIORAL OBSERVATIONS: Alert and cooperative     COGNITION/SAFETY AWARENESS: Appears intact     ORAL MOTOR EXAM: Mild decreased facial strength noted to the right 4-/5.  Minimal lingual deviation on protrusion.     TEST ADMINISTERED: Portions of the Dysarthria Profile  Vocal quality appears good with maximum phonation time 11 seconds.  Patient demonstrated ability to crescendo/decrescendo.  Diadochokinetic rates were within normal limits for single  phonemes, in the good range for double and multiple phonemes.  Intelligibility appears good at the single word level.   Mild-mod decrease of intelligibility is noted in conversational tasks with decreased coordination and imprecise articulation.  Patient noted at times speaking on residual  air.     FUNCTIONAL ASSESSMENT INSTRUMENT: Patient currently scored a level 5 of 7 on Functional Communication Measures for motor speech indicating a 20-39% limitation in function.    ASSESSMENT/ PLAN OF CARE:  Pt presents with limitations, noted below, that impede his ability to communicate effectively. The skills of a therapist will be required to safely and effectively implement the following treatment plan to restore maximal level of function.    PROBLEMS:  1. Decreased intelligibility, decreased oral motor strength/coordination,  decreased voice quality with decreased respiratory support for phonation.   LTG 1: 30 days: To increase functional communication or expression of wants, needs,and ideas independently.   STG 1a: 14 days: To increase oral motor strength and coordination patient will participate in oral motor exercises with strength/range of motion 4+/5.   STG 1b: 14 days: To increase oral motor coordination patient will participate in higher level diadochokinetic tasks in the normal range.   STG 1c: 14 days: To increase intelligibility patient will produce 2-3 word phrases in spontaneous speech with minimal assistance.   STG 1d: 14 days: To increase intelligibility patient will participate in conversation with adequate intelligibility with minimal cues.   STG 1e: To increase phonation patient will utilize breathing technique with maximum phonation of 15 seconds with adequate voicing.   TREATMENT: Speech therapy to address speech production through exercise program.    FREQUENCY/DURATION: Once daily 5 times per week    REHAB POTENTIAL:  Pt has good rehab potential.  The following limitations may influence improvement/ length of tx: Medical status.    Pt/responsible party agrees with plan of care and has been informed of all alternatives, risks and benefits.                   Anticipated Discharge Disposition (SLP): home with assist (04/12/23 1020)                                                  EDUCATION  The patient has been educated in the following areas:     Communication Impairment.                      Time Calculation:      Time Calculation- SLP     Row Name 04/12/23 1020             Time Calculation- SLP    SLP Start Time 0700  -TB      SLP Stop Time 0835  -TB      SLP Time Calculation (min) 95 min  -TB      SLP Received On 04/12/23  -TB         Untimed Charges    SLP Eval/Re-eval  ST Eval Speech Sound Production - 24713;ST Eval Oral Pharyng Swallow - 50568  -TB      [id number]-LU Eval Speech Sound Production Minutes 40  -TB      [id number] Eval Oral Pharyng Swallow Minutes 55  -TB         Total Minutes    Untimed Charges Total Minutes 95  -TB       Total Minutes 95  -TB            User Key  (r) = Recorded By, (t) = Taken By, (c) = Cosigned By    Initials Name Provider Type    TB Lynda Webster SLP Speech and Language Pathologist                Therapy Charges for Today     Code Description Service Date Service Provider Modifiers Qty    14612713512 HC ST EVAL ORAL PHARYNG SWALLOW 4 4/12/2023 Lynda Webster SLP GN 1    59870053429 HC ST EVAL SPEECH SOUND PRODUCTION 3 4/12/2023 Lynda Webster SLP GN 1                     ISAURA Trevino  4/12/2023

## 2023-04-12 NOTE — PLAN OF CARE
Goal Outcome Evaluation:           Progress: no change  Outcome Evaluation: Patient presents with deficits in balance, strength, transfers, and ambulation. Patient will benefit from skilled PT services to address these mobility deficits and decrease risk of falls. Recommend inpatient rehab following hospital discharge.

## 2023-04-12 NOTE — THERAPY EVALUATION
Acute Care - Physical Therapy Initial Evaluation   Neri     Patient Name: Demetrio Mariano  : 1970  MRN: [id number]  Today's Date: 2023      Visit Dx:     ICD-10-CM ICD-9-CM   1. Cerebrovascular accident (CVA), unspecified mechanism  I63.9 434.91   2. Oropharyngeal dysphagia  R13.12 787.22   3. Dysarthria  R47.1 784.51   4. Decreased activities of daily living (ADL)  Z78.9 V49.89   5. 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   • Nicotine dependence   • Acute CVA (cerebrovascular accident)   • Stroke     Past Medical History:   Diagnosis Date   • CHF (congestive heart failure)    • Coronary artery disease involving native heart without angina pectoris      Nonobstructive coronary artery disease involving diagonal branch per cardiac catheterization done on 2021   • Essential hypertension, benign    • Hypercholesterolemia    • ICD (implantable cardioverter-defibrillator), dual, in situ 2021   • Nicotine dependence    • Non-ischemic cardiomyopathy (CMS/HCC) 2021    Mildly dilated left ventricular cavity with severe global hypokinesis with estimated LV ejection fraction of       28%. 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    • Type I diabetes mellitus, uncontrolled      Past Surgical History:   Procedure Laterality Date   • CARDIAC CATHETERIZATION     • CARDIAC ELECTROPHYSIOLOGY PROCEDURE N/A 06/15/2021    Procedure: ICD SC new;  Surgeon: Regulo Coyne MD;  Location: [facility] INVASIVE LOCATION;  Service: Cardiovascular;   Laterality: N/A;   • FRACTURE SURGERY Right     crushed heel injury with 14 fractures   • OTHER SURGICAL HISTORY  06/10/2013    plication for treatment of pyronie's disease   • VASECTOMY       PT Assessment (last 12 hours)     PT Evaluation and Treatment     Row Name 04/12/23 1505          Physical Therapy Time and Intention    Document Type evaluation  -AV     Mode of Treatment individual therapy;physical therapy  -AV     Row Name 04/12/23 1505          General Information    Patient Profile Reviewed yes  -AV     Patient Observations alert;cooperative;agree to therapy  -AV     Prior Level of Function independent:;all household mobility;gait;transfer;ADL's  Ambulated without an assistive device. No home O2.  -AV     Equipment Currently Used at Home none  -AV     Existing Precautions/Restrictions fall  -AV     Row Name 04/12/23 1505          Living Environment    Current Living Arrangements home  -AV     Home Accessibility stairs within home;stairs to enter home  -AV     People in Home spouse  -AV     Row Name 04/12/23 1505          Home Main Entrance    Number of Stairs, Main Entrance two  -AV     Row Name 04/12/23 1505          Stairs Within Home, Primary    Stairs, Within Home, Primary Flight to basement where laundry is located.  -AV     Row Name 04/12/23 1505          Cognition    Orientation Status (Cognition) oriented x 3  -AV     Row Name 04/12/23 1505          Range of Motion (ROM)    Range of Motion bilateral lower extremities;ROM is WFL  -AV     Row Name 04/12/23 1505          Strength (Manual Muscle Testing)    Strength (Manual Muscle Testing) right lower extremity strength;left lower extremity strength  -AV     Left Lower Extremity Strength left LE strength is WFL  -AV     Right Lower Extremity Strength --  4/5  -AV     Row Name 04/12/23 1505          Bed Mobility    Bed Mobility supine-sit-supine  -AV     Supine-Sit-Supine [place] (Bed Mobility) standby assist  -AV     Row Name 04/12/23 150           Transfers    Transfers sit-stand transfer;stand-sit transfer  -AV     Row Name 04/12/23 1505          Sit-Stand Transfer    Sit-Stand [place] (Transfers) contact guard  -AV     Assistive Device (Sit-Stand Transfers) walker, front-wheeled  -AV     Row Name 04/12/23 1505          Stand-Sit Transfer    Stand-Sit Lohn (Transfers) contact guard  -AV     Assistive Device (Stand-Sit Transfers) walker, front-wheeled  -AV     Row Name 04/12/23 1506          Gait/Stairs (Locomotion)    Gait/Stairs Locomotion gait/ambulation independence;gait/ambulation assistive device;distance ambulated  -AV     Lohn Level (Gait) minimum assist (75% patient effort)  -AV     Assistive Device (Gait) walker, front-wheeled  -AV     Distance in Feet (Gait) 120  -AV     Deviations/Abnormal Patterns (Gait) scissoring;other (see comments)  veers right  -AV     Row Name 04/12/23 1504          Plan of Care Review    Progress no change  -AV     Outcome Evaluation Patient presents with deficits in balance, strength, transfers, and ambulation. Patient will benefit from skilled PT services to address these mobility deficits and decrease risk of falls. Recommend inpatient rehab following hospital discharge.  -AV     Row Name 04/12/23 1506          Therapy Assessment/Plan (PT)    Rehab Potential (PT) good, to achieve stated therapy goals  -AV     Criteria for Skilled Interventions Met (PT) yes;meets criteria  -AV     Therapy Frequency (PT) daily  -AV     Predicted Duration of Therapy Intervention (PT) 10 days  -AV     Problem List (PT) problems related to;balance;mobility;coordination  -AV     Activity Limitations Related to Problem List (PT) unable to ambulate safely;unable to transfer safely  -AV     Row Name 04/12/23 1506          PT Evaluation Complexity    History, PT Evaluation Complexity 1-2 personal factors and/or comorbidities  -AV     Examination of Body Systems (PT Eval Complexity) total of 4 or more elements  -AV      Clinical Presentation (PT Evaluation Complexity) stable  -AV     Clinical Decision Making (PT Evaluation Complexity) low complexity  -AV     Overall Complexity (PT Evaluation Complexity) low complexity  -AV     Row Name 04/12/23 1505          Therapy Plan Review/Discharge Plan (PT)    Therapy Plan Review (PT) evaluation/treatment results reviewed;patient  -AV     Row Name 04/12/23 1505          Physical Therapy Goals    Transfer Goal Selection (PT) transfer, PT goal 1  -AV     Gait Training Goal Selection (PT) gait training, PT goal 1  -AV     Row Name 04/12/23 1506          Transfer Goal 1 (PT)    Activity/Assistive Device (Transfer Goal 1, PT) sit-to-stand/stand-to-sit;bed-to-chair/chair-to-bed;walker, rolling  -AV     [place] Level/Cues Needed (Transfer Goal 1, PT) modified independence  -AV     Time Frame (Transfer Goal 1, PT) 10 days  -AV     Row Name 04/12/23 1506          Gait Training Goal 1 (PT)    Activity/Assistive Device (Gait Training Goal 1, PT) gait (walking locomotion);assistive device use;walker, rolling  -AV     [place] Level (Gait Training Goal 1, PT) modified independence  -AV     Distance (Gait Training Goal 1, PT) 200  -AV     Time Frame (Gait Training Goal 1, PT) 10 days  -AV           User Key  (r) = Recorded By, (t) = Taken By, (c) = Cosigned By    Initials Name Provider Type    Kadeem Miner, PT Physical Therapist                Physical Therapy Education     Title: PT OT SLP Therapies (In Progress)     Topic: Physical Therapy (In Progress)     Point: Mobility training (Done)     Learning Progress Summary           Patient Acceptance, E,TB, VU by AV at 4/12/2023 1532                   Point: Home exercise program (Not Started)     Learner Progress:  Not documented in this visit.          Point: Body mechanics (Done)     Learning Progress Summary           Patient Acceptance, E,TB, VU by AV at 4/12/2023 1532                   Point: Precautions (Done)     Learning Progress  Summary           Patient Acceptance, E,TB, VU by AV at 4/12/2023 1532                               User Key     Initials Effective Dates Name Provider Type Discipline    AV 06/11/21 -  Kadeem Mendoza, PT Physical Therapist PT              PT Recommendation and Plan  Anticipated Discharge Disposition (PT): inpatient rehabilitation facility  Planned Therapy Interventions (PT): balance training, bed mobility training, gait training, neuromuscular re-education, strengthening, transfer training  Therapy Frequency (PT): daily  Progress: no change  Outcome Evaluation: Patient presents with deficits in balance, strength, transfers, and ambulation. Patient will benefit from skilled PT services to address these mobility deficits and decrease risk of falls. Recommend inpatient rehab following hospital discharge.   Outcome Measures     Row Name 04/12/23 1500             How much help from another person do you currently need...    Turning from your back to your side while in flat bed without using bedrails? 4  -AV      Moving from lying on back to sitting on the side of a flat bed without bedrails? 4  -AV      Moving to and from a bed to a chair (including a wheelchair)? 3  -AV      Standing up from a chair using your arms (e.g., wheelchair, bedside chair)? 3  -AV      Climbing 3-5 steps with a railing? 2  -AV      To walk in hospital room? 3  -AV      AM-PAC 6 Clicks Score (PT) 19  -AV         Functional Assessment    Outcome Measure Options AM-PAC 6 Clicks Basic Mobility (PT)  -AV            User Key  (r) = Recorded By, (t) = Taken By, (c) = Cosigned By    Initials Name Provider Type    AV Kadeem Mendoza, PT Physical Therapist                 Time Calculation:    PT Charges     Row Name 04/12/23 1531             Time Calculation    PT Received On 04/12/23  -AV      PT Goal Re-Cert Due Date 04/21/23  -AV         Untimed Charges    PT Eval/Re-eval Minutes 35  -AV         Total Minutes    Untimed Charges Total Minutes 35   -AV       Total Minutes 35  -AV            User Key  (r) = Recorded By, (t) = Taken By, (c) = Cosigned By    Initials Name Provider Type    AV Kadeem Mendoza, PT Physical Therapist              Therapy Charges for Today     Code Description Service Date Service Provider Modifiers Qty    31577945522 HC PT EVAL LOW COMPLEXITY 3 4/12/2023 Kadeem Mendoza, PT GP 1          PT G-Codes  Outcome Measure Options: AM-PAC 6 Clicks Basic Mobility (PT)  AM-PAC 6 Clicks Score (PT): 19  AM-PAC 6 Clicks Score (OT): 21    Kadeem Mendoza PT  4/12/2023

## 2023-04-12 NOTE — H&P
[facility]   HISTORY AND PHYSICAL    Patient Name: Demetrio Mariano  : 1970  MRN: [id number]  Primary Care Physician: Kia Mayorga APRN  Date of admission: 2023    Subjective   Subjective     Chief Complaint: Strokelike features    History of Present Illness  52-year-old male recent smoker with CHF (AICD in place) CAD, DM, and multiple recent strokes presents again with new onset aphasia and dysarthria.  Note: The patient was discharged Friday following an admission for new stroke (see DC summary/2023).  Patient was last seen at his new normal last night but is now found to have new onset right-arm weakness, right-sided facial droop, and slurred speech with some word finding issues.    In the ED:  VSS    Labs largely unremarkable:  Glucose 189  WBC 11.68    CT head:  1. Evolution of previously noted late acute, subacute stroke within the left basal ganglia, deep   white matter.  2. No acute hemorrhage noted  3. Remote white matter changes and prior infarcts additionally noted    Teleneurology was consulted:  51 y/o with PMH of righthandedness, smoking, DM, CHF, pacer, right pontine ischemic stroke , left basal ganglia stroke one week ago (residual slurred speech without other residual symptoms; d/c'd on DAPT). LKW about 4:00 this am with subsequent gait unsteadiness, worse dysarthria, right arm and leg weakness. NIHSS 3 (mild dysarthria, reduced LT right side, RLE dysmetria).      He is not a candidate for thrombolysis (stroke w/in 3 mos; LKW>4.5h).       Review of Systems     Personal History     Past Medical History:   Diagnosis Date   • CHF (congestive heart failure)    • Coronary artery disease involving native heart without angina pectoris      Nonobstructive coronary artery disease involving diagonal branch per cardiac catheterization done on 2021   • Essential hypertension, benign    • Hypercholesterolemia    • ICD (implantable cardioverter-defibrillator), dual, in situ  09/22/2021   • Nicotine dependence    • Non-ischemic cardiomyopathy (CMS/HCC) 05/03/2021    Mildly dilated left ventricular cavity with severe global hypokinesis with estimated LV ejection fraction of       28%. 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    • Type I diabetes mellitus, uncontrolled        Past Surgical History:   Procedure Laterality Date   • CARDIAC CATHETERIZATION     • CARDIAC ELECTROPHYSIOLOGY PROCEDURE N/A 06/15/2021    Procedure: ICD SC new;  Surgeon: Regulo Coyne MD;  Location: [facility] CATH INVASIVE LOCATION;  Service: Cardiovascular;  Laterality: N/A;   • FRACTURE SURGERY Right     crushed heel injury with 14 fractures   • OTHER SURGICAL HISTORY  06/10/2013    plication for treatment of pyronie's disease   • VASECTOMY         Family History: family history includes Diabetes in his father and mother. Otherwise pertinent FHx was reviewed and not pertinent to current issue.    Social History:  reports that he has been smoking cigarettes. He has been smoking an average of 1 pack per day. He has never used smokeless tobacco. He reports current alcohol use of about 1.0 standard drink per week. He reports that he does not currently use drugs.    Home Medications:  FreeStyle Francis 2 Sensor, Glucagon, Insulin Aspart, aspirin, atorvastatin, carvedilol, clopidogrel, furosemide, insulin, sacubitril-valsartan, and spironolactone      Allergies:  No Known Allergies    Objective    Objective     Vitals:  Temp:  [97.3 °F (36.3 °C)-97.9 °F (36.6 °C)] 97.9 °F (36.6 °C)  Heart Rate:  [65-79] 79  Resp:  [16-18] 18  BP: (111-145)/(68-84) 111/68    Physical Exam    Result Review    Result Review:  I have personally reviewed the results from the time of this admission to 4/11/2023 21:39 EDT and agree with these findings:  []  Laboratory list / accordion  []  Microbiology  []  Radiology  []  EKG/Telemetry   []  Cardiology/Vascular   []   Pathology  []  Old records  []  Other:  Most notable findings include: Evolving infarct on CT head        Assessment & Plan   Assessment / Plan     Brief Patient Summary:  Demetrio Mariano is a 52 y.o. male who     Active Hospital Problems:  Active Hospital Problems    Diagnosis    • **Stroke        Plan:   Stroke (one of several recently)   Recommendations/Plan:   1) CTA/P is pending, and I will addend this note with results and further recommendations if needed.   2) If the CTA shows no LVO then I recommend an unenhanced brain MRI (ordered).   3) I do not see that he has had a TTE since 9/21. A very low EF may be an indication for chronic oral anticoagulation, and the TTE should be repeated.  (Ordered)   4) This is the third ischemic stroke event he has had in three months. He has stroke risk factors, but he is young. Consider hypercoagulability workup.   5) Continue DAPT   6) Further recommendations per neurology pending the above results    Diabetes  -Patient refuses removal of his insulin pump.  Staff concerns were explained to him and he understands that it is not per standard protocol and assumes the risk of any issues with his blood sugar management.   -Insulin pump still in place         DVT prophylaxis: SCDs    CODE STATUS: DNR/DNI    Admission Status:  I believe this patient meets observation status.    Jaret Toney MD,

## 2023-04-12 NOTE — CASE MANAGEMENT/SOCIAL WORK
Discharge Planning Assessment  [facility]     Patient Name: Demetrio Mariano  MRN: [id number]  Today's Date: 4/12/2023    Admit Date: 4/11/2023    Plan: Patient admitted due to dysarthria following recent cva. Patient was recently discharged from [facility]. Patient notes he did  his new medications, no new diet was instructed, had no further concerns related to rpevious discharge instructions, wife was present at the bedside and noted she did not have concerns at this time and read over the discharge instructions. Patient verified the PCP and pharmacy. Patient is independent at home with ADLs, currently still working FT. Does report difficulty speaking and forming sentences, at time of assessment patient did show signs of slurred speach and word forming difficulty. Patient plans to return home at discharge, family will provide transportation home.   Discharge Needs Assessment     Row Name 04/12/23 1305       Discharge Needs Assessment    Equipment Currently Used at Home medication pump    Row Name 04/12/23 1304       Living Environment    People in Home child(terry), adult;spouse    Current Living Arrangements home    Primary Care Provided by self    Family Caregiver if Needed spouse    Quality of Family Relationships helpful;involved;supportive    Able to Return to Prior Arrangements yes       Resource/Environmental Concerns    Resource/Environmental Concerns none    Transportation Concerns none       Transition Planning    Patient/Family Anticipates Transition to home with family    Patient/Family Anticipated Services at Transition none    Transportation Anticipated family or friend will provide       Discharge Needs Assessment    Readmission Within the Last 30 Days no previous admission in last 30 days    Equipment Currently Used at Home none    Concerns to be Addressed no discharge needs identified    Anticipated Changes Related to Illness none    Equipment Needed After Discharge none    Provided Post Acute  Provider List? N/A    Provided Post Acute Provider Quality & Resource List? N/A               Discharge Plan     Row Name 04/12/23 1308       Plan    Plan Patient admitted due to dysarthria following recent cva. Patient was recently discharged from [facility]. Patient notes he did  his new medications, no new diet was instructed, had no further concerns related to rpevious discharge instructions, wife was present at the bedside and noted she did not have concerns at this time and read over the discharge instructions. Patient verified the PCP and pharmacy. Patient is independent at home with ADLs, currently still working FT. Does report difficulty speaking and forming sentences, at time of assessment patient did show signs of slurred speach and word forming difficulty. Patient plans to return home at discharge, family will provide transportation home.    Final Discharge Disposition Code 01 - home or self-care              Continued Care and Services - Admitted Since 4/11/2023    Coordination has not been started for this encounter.       Expected Discharge Date and Time     Expected Discharge Date Expected Discharge Time    Apr 13, 2023          Demographic Summary     Row Name 04/12/23 7466       General Information    Admission Type observation    Arrived From home;emergency department    Referral Source admission list    Reason for Consult discharge planning    Preferred Language English       Contact Information    Permission Granted to Share Info With family/designee    Contact Information Obtained for                Functional Status     Row Name 04/12/23 1302       Functional Status    Usual Activity Tolerance good    Current Activity Tolerance good       Assessment of Health Literacy    How often do you have someone help you read hospital materials? Occasionally    How often do you have problems learning about your medical condition because of difficulty understanding written information? Never     How often do you have a problem understanding what is told to you about your medical condition? Never    How confident are you filling out medical forms by yourself? Quite a bit    Health Literacy Good       Functional Status, IADL    Medications independent    Meal Preparation independent    Housekeeping independent    Laundry independent    Shopping independent       Mental Status    General Appearance WDL WDL       Mental Status Summary    Recent Changes in Mental Status/Cognitive Functioning no changes       Employment/    Employment Status employed full-time    Shift Worked first shift    Current or Previous Occupation factory work               Psychosocial    No documentation.                Abuse/Neglect    No documentation.                Legal    No documentation.                Substance Abuse    No documentation.                Patient Forms    No documentation.                   Lola Burris RN

## 2023-04-13 ENCOUNTER — READMISSION MANAGEMENT (OUTPATIENT)
Dept: CALL CENTER | Facility: HOSPITAL | Age: 53
End: 2023-04-13
Payer: COMMERCIAL

## 2023-04-13 VITALS
HEART RATE: 72 BPM | BODY MASS INDEX: 25.22 KG/M2 | OXYGEN SATURATION: 98 % | WEIGHT: 160.72 LBS | HEIGHT: 67 IN | TEMPERATURE: 97.9 F | DIASTOLIC BLOOD PRESSURE: 56 MMHG | SYSTOLIC BLOOD PRESSURE: 128 MMHG | RESPIRATION RATE: 18 BRPM

## 2023-04-13 LAB
ANION GAP SERPL CALCULATED.3IONS-SCNC: 10 MMOL/L (ref 5–15)
BUN SERPL-MCNC: 23 MG/DL (ref 6–20)
BUN/CREAT SERPL: 29.1 (ref 7–25)
CALCIUM SPEC-SCNC: 8.7 MG/DL (ref 8.6–10.5)
CHLORIDE SERPL-SCNC: 109 MMOL/L (ref 98–107)
CO2 SERPL-SCNC: 22 MMOL/L (ref 22–29)
CREAT SERPL-MCNC: 0.79 MG/DL (ref 0.76–1.27)
DEPRECATED RDW RBC AUTO: 43.2 FL (ref 37–54)
EGFRCR SERPLBLD CKD-EPI 2021: 107.6 ML/MIN/1.73
ERYTHROCYTE [DISTWIDTH] IN BLOOD BY AUTOMATED COUNT: 13.2 % (ref 12.3–15.4)
GLUCOSE BLDC GLUCOMTR-MCNC: 54 MG/DL (ref 70–99)
GLUCOSE BLDC GLUCOMTR-MCNC: 63 MG/DL (ref 70–99)
GLUCOSE BLDC GLUCOMTR-MCNC: 85 MG/DL (ref 70–99)
GLUCOSE SERPL-MCNC: 93 MG/DL (ref 65–99)
HCT VFR BLD AUTO: 35.7 % (ref 37.5–51)
HGB BLD-MCNC: 12.7 G/DL (ref 13–17.7)
MAGNESIUM SERPL-MCNC: 2 MG/DL (ref 1.6–2.6)
MCH RBC QN AUTO: 31.8 PG (ref 26.6–33)
MCHC RBC AUTO-ENTMCNC: 35.6 G/DL (ref 31.5–35.7)
MCV RBC AUTO: 89.3 FL (ref 79–97)
PLATELET # BLD AUTO: 323 10*3/MM3 (ref 140–450)
PMV BLD AUTO: 9.9 FL (ref 6–12)
POTASSIUM SERPL-SCNC: 4 MMOL/L (ref 3.5–5.2)
RBC # BLD AUTO: 4 10*6/MM3 (ref 4.14–5.8)
SODIUM SERPL-SCNC: 141 MMOL/L (ref 136–145)
WBC NRBC COR # BLD: 8.71 10*3/MM3 (ref 3.4–10.8)

## 2023-04-13 PROCEDURE — 83735 ASSAY OF MAGNESIUM: CPT | Performed by: INTERNAL MEDICINE

## 2023-04-13 PROCEDURE — G0378 HOSPITAL OBSERVATION PER HR: HCPCS

## 2023-04-13 PROCEDURE — 96372 THER/PROPH/DIAG INJ SC/IM: CPT

## 2023-04-13 PROCEDURE — 85027 COMPLETE CBC AUTOMATED: CPT | Performed by: INTERNAL MEDICINE

## 2023-04-13 PROCEDURE — 80048 BASIC METABOLIC PNL TOTAL CA: CPT | Performed by: INTERNAL MEDICINE

## 2023-04-13 PROCEDURE — 82962 GLUCOSE BLOOD TEST: CPT

## 2023-04-13 PROCEDURE — 96376 TX/PRO/DX INJ SAME DRUG ADON: CPT

## 2023-04-13 PROCEDURE — 25010000002 ENOXAPARIN PER 10 MG: Performed by: INTERNAL MEDICINE

## 2023-04-13 RX ORDER — NICOTINE 21 MG/24HR
1 PATCH, TRANSDERMAL 24 HOURS TRANSDERMAL
Qty: 21 EACH | Refills: 0 | Status: SHIPPED | OUTPATIENT
Start: 2023-04-14

## 2023-04-13 RX ORDER — NICOTINE POLACRILEX 4 MG
15 LOZENGE BUCCAL
Status: DISCONTINUED | OUTPATIENT
Start: 2023-04-13 | End: 2023-04-13 | Stop reason: HOSPADM

## 2023-04-13 RX ORDER — DEXTROSE MONOHYDRATE 25 G/50ML
25 INJECTION, SOLUTION INTRAVENOUS
Status: DISCONTINUED | OUTPATIENT
Start: 2023-04-13 | End: 2023-04-13 | Stop reason: HOSPADM

## 2023-04-13 RX ADMIN — ASPIRIN 81 MG: 81 TABLET, CHEWABLE ORAL at 08:43

## 2023-04-13 RX ADMIN — ENOXAPARIN SODIUM 40 MG: 100 INJECTION SUBCUTANEOUS at 08:44

## 2023-04-13 RX ADMIN — SPIRONOLACTONE 25 MG: 25 TABLET ORAL at 08:43

## 2023-04-13 RX ADMIN — CLOPIDOGREL BISULFATE 75 MG: 75 TABLET ORAL at 08:42

## 2023-04-13 RX ADMIN — Medication 10 ML: at 08:45

## 2023-04-13 RX ADMIN — SACUBITRIL AND VALSARTAN 1 TABLET: 97; 103 TABLET, FILM COATED ORAL at 08:43

## 2023-04-13 RX ADMIN — CARVEDILOL 25 MG: 25 TABLET, FILM COATED ORAL at 08:42

## 2023-04-13 RX ADMIN — DEXTROSE MONOHYDRATE 25 G: 25 INJECTION, SOLUTION INTRAVENOUS at 05:29

## 2023-04-13 RX ADMIN — FUROSEMIDE 20 MG: 20 TABLET ORAL at 08:42

## 2023-04-13 RX ADMIN — SODIUM CHLORIDE, POTASSIUM CHLORIDE, SODIUM LACTATE AND CALCIUM CHLORIDE 500 ML: 600; 310; 30; 20 INJECTION, SOLUTION INTRAVENOUS at 05:52

## 2023-04-13 NOTE — DISCHARGE SUMMARY
Discharge Summary  4/13/2023  10:11 EDT       Patient Name: Demetrio Mariano    YOB: 1970    MRN: [id number]     CODE STATUS:   Code Status and Medical Interventions:   Ordered at: 04/12/23 0521     Medical Intervention Limits:    NO intubation (DNI)     Level Of Support Discussed With:    Patient     Code Status (Patient has no pulse and is not breathing):    No CPR (Do Not Attempt to Resuscitate)     Medical Interventions (Patient has pulse or is breathing):    Limited Support     Release to patient:    Routine Release        Admit Date: 4/11/2023   Discharge Date: 4/13/2023   Date of Service: 4/13/2023     Admitting Physician: Jaret Toney MD   Attending Physician: Katya Galloway MD   Primary Care Physician:  Kia Mayorga APRN     Admitting Diagnosis:   CVA    Discharge Diagnoses:   Active Hospital Problems    *Stroke     Concern for new/worsening stroke frontal  Recent history of left basal ganglia stroke (04/05/2023)  Recent history of right pontine ischemic stroke (01/2023)  Heart failure with reduced ejection fraction, not acutely exacerbated  Essential hypertension  Hyperlipidemia  Nonischemic cardiomyopathy s/p ICD implantation  Type 1 diabetes mellitus, poorly controlled  Chronic ongoing tobacco abuse with cigarettes    Hospital Course:    52 y.o. male with type 1 diabetes mellitus on insulin pump, heart failure with reduced ejection fraction, history of right pontine ischemic stroke (01/2023) and history of recent left basal ganglia stroke (04/05/2020) who presented to the ED with new onset aphasia and dysarthria.  CTA head and neck did not show any large vessel occlusion, significant stenosis or aneurysm.  Teleneurology evaluated in ED and recommended patient be admitted for further stroke work-up.  Neurology was consulted had an MRI showed1. Subacute 1.2 cm infarct in the left frontal periventricular white matter 2. Questionable acute lacunar infarct measuring 0.5 cm in the  anterior left an. Correlate with neurologic exam  Okay to DC home by neurology on aspirin Plavix and statin.    Consultants:    IP CONSULT TO HOSPITALIST  IP CONSULT TO CASE MANAGEMENT   IP CONSULT TO DIABETES EDUCATOR  IP CONSULT TO NEUROLOGY  IP CONSULT TO DIABETES EDUCATOR    Procedures:  Head CT  CT of the neck  MRI of the brain    Anti-infectives:    None    Discharge Examination:   Vitals:    04/12/23 2234 04/12/23 2325 04/13/23 0335 04/13/23 0719   BP: 112/52 106/56 (!) 86/51 128/56   BP Location:  Left arm Left arm Left arm   Patient Position:  Lying Lying Lying   Pulse: 78 77 74 72   Resp:  18 18 18   Temp:  98.2 °F (36.8 °C) 98.2 °F (36.8 °C) 97.9 °F (36.6 °C)   TempSrc:  Oral Oral Oral   SpO2:  97% 99% 98%   Weight:       Height:          GENERAL: Alert and oriented to person, place, and time. No acute distress  HEENT: Atraumatic, normocephalic,  PERRLA, mucous membranes moist; oropharynx without erythema   NECK: supple, no adenopathy  CARDIOVASCULAR: Regular rate and rhythm, no murmurs present; no edema in BLE   RESPIRATORY: breathing unlabored; Clear to auscultation bilaterally  GASTROINTESTINAL: abdomen soft, non-tender, non-distended, no organomegaly; positive bowel sounds present; no CVA or rebound tenderness,  no guarding present.  EXTREMITIES: Pulses equal bilaterally, no edema present  MUSCULOSKELETAL: Muscle strength 5/5 in all 4 extremities with full range of motion.  No atrophy.   NEUROLOGIC: Cranial Nerves II-XII intact, sensation intact bilaterally, deep tendon reflexes equal in all extremities, gait normal. Follows commands.    PSYCH:  Appropriate mood and affect   DERM: No lesions or rashes appreciated.       Discharge Plan:     Condition: Medically appropriate for discharge     Disposition:  Home with self-care    Diet: Diet: Diabetic Diets; Consistent Carbohydrate; Texture: Regular Texture (IDDSI 7); Fluid Consistency: Thin (IDDSI 0)            Activity:  As  tolerated    Followup Appointments:   Future Appointments   Date Time Provider Department Center   5/9/2023  3:45 PM Tommy Stoner MD [facility] [place] [facility]   6/12/2023  1:40 PM Jennifer Delgado APRN [facility] [place] [facility]      Follow-up with primary care physician in 1 week(s)  Follow-up with neurology in 1 week(s)      Discharge Medications:   [unfilled]     Pertinent Data/Imaging:  Lab Results   Component Value Date    WBC 8.71 04/13/2023    HGB 12.7 (L) 04/13/2023    HCT 35.7 (L) 04/13/2023    MCV 89.3 04/13/2023     Lab Results   Component Value Date    CREATININE 0.79 04/13/2023    BUN 23 (H) 04/13/2023     04/13/2023    CO2 22.0 04/13/2023     Lab Results   Component Value Date    INR 1.00 04/11/2023    PROTIME 13.3 04/11/2023       Imaging:  Adult Transthoracic Echo Complete W/ Cont if Necessary Per Protocol (With Agitated Saline)    Result Date: 4/12/2023  Narrative: Borderline normal left ventricular systolic function ejection fraction around 50%. Trace MR and trace TR.     CT Angiogram Neck    Result Date: 4/11/2023  Narrative: PROCEDURE: CT ANGIOGRAM HEAD W AI ANALYSIS OF LVO, 4/11/2023, 13:22 CT ANGIOGRAM NECK, 4/11/2023, 13:22  COMPARISON: [facility], CT, CT ANGIOGRAM NECK, 4/05/2023, 22:52.  [facility], CT, CT ANGIOGRAM HEAD W AI ANALYSIS OF LVO, 4/05/2023, 22:52.  [facility], CT, CT CEREBRAL PERFUSION W WO CONTRAST, 4/11/2023, 13:11.  [facility], MR, MRI BRAIN WO CONTRAST, 4/06/2023, 14:52.  [facility], CT, CT HEAD WO CONTRAST STROKE PROTOCOL, 4/11/2023, 12:52.  INDICATIONS: Neuro deficit, acute, stroke suspected,slurred speech  PROTOCOL:   Standard imaging protocol performed    RADIATION:      Automated exposure control was utilized to minimize radiation dose. RAPID: CTA imaging was analyzed using RAPID AI to enable computer assisted triage notification to rapidly detect a large vessel occlusion (LVO) and  shorten notification time  TECHNIQUE: After obtaining the patient's consent, CT images of the head were obtained without and with non-ionic contrast, and multi-planar/3-D imaging were created and interpreted to optimize visualization of vascular anatomy.   FINDINGS:   Study is somewhat limited by motion  Vascular:  CT neck:  Limited imaging of the aortic arch is grossly unremarkable in appearance.  The left subclavian artery, left vertebral artery and left common carotid artery are grossly unremarkable in appearance.  Hard plaque measuring less than 50% stenosis at the bifurcation noted.  External and internal carotid arteries remain patent.  Right innominate artery and right subclavian artery are unremarkable.  Left vertebral artery is smaller than the left and remains patent to the base of the skull.  Perigraft right common carotid artery demonstrates no significant abnormality.  Minimal atherosclerotic change at the bifurcation and internal carotid artery noted without significant stenosis perigraft CT a head:  Posterior circulation remains patent.  Perigraft anterior circulation demonstrates atherosclerotic changes within the carotid siphon bilaterally.  No evidence of large vessel occlusion noted.  The right A1 segment is diminutive with the anterior circulation supplied predominantly from the left.  Bilateral MCAs remain unremarkable.  Nonvascular:  No abnormal enhancement noted.  White matter changes compatible with small lacunar infarcts again noted similar to the prior study within the posterior fossa.  Evolution of infarct within the left basal ganglia noted as mentioned previous same day.  Perigraft limited imaging of the soft tissues of the orbits and the head neck remain grossly unremarkable in appearance.  Perigraft limited imaging of the mediastinum demonstrates no acute abnormality.  Pacemaker remains in place.  Mild emphysematous changes at the lung apices again noted.  Scarring is noted at the lung  "apices.  Osseous structures are stable      Impression:   1. No evidence of large vessel occlusion, significant stenosis or aneurysm formation.  No significant change from the previous study of vessels within the head neck     BONG CASTILLO MD       Electronically Signed and Approved By: BONG CASTILLO MD on 4/11/2023 at 15:26             CT Angiogram Neck    Result Date: 4/6/2023  Narrative: PROCEDURE: CT ANGIOGRAM NECK  COMPARISON: 4/5/2023.  INDICATIONS: 52-YEAR-OLD MALE W/ H/O SLURRED SPEECH, TODAY; THE PATIENT WAS LAST KNOWN TO BE \"WELL\" AT APPROXIMATELY 5:45 A.M. THIS MORNING; H/O STROKE; THE PT. TAKES 81 MG OF ASPIRIN, DAILY; NO OTHER ANTICOAGULATION MEDICATIONS.  PROTOCOL:   Standard CTA imaging protocol performed.    RADIATION:   Total DLP: 558 mGy*cm.   Automated exposure control was utilized to minimize radiation dose. CONTRAST: 70 mL Isovue 370 I.V.  TECHNIQUE: After obtaining the patient's consent, 846 CT/CTA images of the neck were obtained with intravenous contrast.  Multi-planar/3-D imaging was created and interpreted to optimize visualization of vascular anatomy.  Unless otherwise stated in this report, all vascular stenoses involving the internal carotid arteries, reported for this examination, are derived by dividing the lesion diameter by the diameter of the normal internal carotid artery more distally (that is, using NASCET criteria).  FINDINGS:  LEFT INTERNAL CAROTID: No hemodynamically significant stenosis or dissection.  Calcified atherosclerotic plaque involves the proximal left internal artery EXTERNAL CAROTID: No hemodynamically significant stenosis or dissection.  Calcified atherosclerotic plaque involves the origin of the left external carotid artery. COMMON CAROTID: No hemodynamically significant stenosis or dissection.  VERTEBRAL: No hemodynamically significant stenosis or dissection.  The left vertebral artery is thought to be dominant.  RIGHT INTERNAL CAROTID: No hemodynamically " significant stenosis or dissection.  Calcified atherosclerotic plaque involves the proximal right internal carotid artery. EXTERNAL CAROTID: No hemodynamically significant stenosis or dissection.  COMMON CAROTID: No hemodynamically significant stenosis or dissection.  VERTEBRAL: No hemodynamically significant stenosis or dissection.   OTHER: There are degenerative changes of the cervical spine, especially at C6-7.  Streak artifact from dental amalgam obscures detail.  There is slight motion artifact on the study.  There is a left-sided cardiac implantable electronic device (CIED) in place with associated streak artifact.  Mild emphysematous changes involve the partially imaged lung apices.      Impression:   1. No emergent large vessel occlusion is seen.  2. No hemodynamically significant stenoses are suspected.  3. No arterial dissection is identified.  4. The left vertebral artery is dominant.  5. Please see the cerebral CTA exam report (from the same day) for further detail regarding additional findings.     Please note that portions of this note were completed with a voice recognition program.  TIEN BLANCO JR, MD       Electronically Signed and Approved By: TIEN BLANCO JR, MD on 4/06/2023 at 0:48              MRI Brain Without Contrast    Result Date: 4/12/2023  Narrative: PROCEDURE: MRI BRAIN WO CONTRAST  COMPARISON: [facility], CT, CT HEAD WO CONTRAST STROKE PROTOCOL, 4/05/2023, 22:22.  [facility], CT, CT HEAD WO CONTRAST STROKE PROTOCOL, 4/11/2023, 12:52.  [facility], MR, MRI BRAIN WO CONTRAST, 4/06/2023, 14:52.  INDICATIONS: STROKE FOLLOW UP, SLURRED SPEECH      TECHNIQUE: A variety of imaging planes and parameters were utilized for visualization of suspected pathology.  Images were performed without contrast.  FINDINGS:  In the left frontal periventricular white matter is a 1.2 cm focus of diffusion restriction consistent with a subacute infarct.  It was  present on the 4/6/2023 study.  In the anterior left an is a questionable focus of restricted diffusion measuring 0.5 cm.  This appears new since the previous 4/6/2023 study.  There is no evidence of intracranial hemorrhage on susceptibility weighted imaging.  There are scattered small foci of T2 high signal in the cerebral white matter likely representing small vessel ischemic disease.  There are multiple chronic lacunar infarcts in the left thalamus and mid an.  T2 high signal foci in the basal ganglia bilaterally may represent chronic lacunar infarcts versus dilated perivascular spaces.  Intravenous contrast was not given to assess for abnormal enhancement.  Flow voids are noted in the major intracranial arteries.  No focal calvarial abnormality is seen.  Visualized extracranial soft tissues appear normal.      Impression:   1. Subacute 1.2 cm infarct in the left frontal periventricular white matter 2. Questionable acute lacunar infarct measuring 0.5 cm in the anterior left an.  Correlate with neurologic exam 3. Chronic lacunar infarcts in the an and left thalamus.  Possible chronic lacunar infarct versus dilated perivascular spaces in the bilateral basal ganglia. 4. Mild small vessel ischemic changes in the white matter      José Miguel Dover M.D.       Electronically Signed and Approved By: José Miguel Dover M.D. on 4/12/2023 at 13:17             MRI Brain Without Contrast    Result Date: 4/6/2023  Narrative: PROCEDURE: MRI BRAIN WO CONTRAST  COMPARISON: [facility], CT, CT HEAD WO CONTRAST STROKE PROTOCOL, 4/05/2023, 22:22.  INDICATIONS: possible stroke      TECHNIQUE: A variety of imaging planes and parameters were utilized for visualization of suspected pathology.  Images were performed without contrast.  FINDINGS:  There is a focal subacute infarct within the left basal ganglia.  There is no hemorrhagic transformation.  The ventricles are stable in caliber, with no midline shift.  The basal  cisterns appear patent.  Scattered foci of periventricular and subcortical white matter FLAIR hyperintensities are nonspecific, but likely the sequela of mild chronic small vessel ischemic disease.  The midline structures appear intact.  The globes and orbits appear intact.  The intracranial vascular flow voids appear patent.      Impression:   1. Focal subacute infarct within left basal ganglia.  No hemorrhagic transformation or significant mass effect. 2. Findings suggestive of mild chronic small vessel ischemic disease.      MARISOL CARPENTER MD       Electronically Signed and Approved By: MARISOL CARPENTER MD on 4/06/2023 at 16:52             XR Chest 1 View    Result Date: 4/11/2023  Narrative: PROCEDURE: XR CHEST 1 VW  COMPARISON: [facility], CR, XR CHEST 1 VW, 4/05/2023, 23:13.  INDICATIONS: Acute Stroke Protocol (Onset < 12 hrs)  FINDINGS:  Electronic device overlies the left heart.  There is a single lead ICD in place. There is no pneumothorax, pleural effusion or focal airspace consolidation. The heart size and pulmonary vasculature appear within normal limits. There are no acute osseous abnormalities.      Impression:  No acute cardiopulmonary abnormality.       JESSIE OLIVEROS MD       Electronically Signed and Approved By: JESSIE OLIVEROS MD on 4/11/2023 at 13:47             XR Chest 1 View    Result Date: 4/6/2023  Narrative: PROCEDURE: XR CHEST 1 VW  COMPARISON: 6/15/2021.  INDICATIONS: ACUTE STROKE PROTOCOL.  FINDINGS:  A single AP (or PA) upright portable chest radiograph was performed.  No cardiac enlargement is seen.  No pleural effusion or pneumothorax is identified.  An implantable loop recorder (ILR) is thought to be in place, projected over the left subphrenic region, new since the prior 6/15/2021 exam.  External artifacts obscure detail.  Chronic calcified granulomatous disease involves the chest.  There is a left-sided cardiac implantable electronic device (CIED) in place, seen  previously, and unchanged in position since the prior study.  There is thought to be mild degenerative change of the right acromioclavicular (AC) joint.  There is slight pulmonary hypoinflation with mild bibasilar subsegmental atelectasis.  Acute infiltrate is thought to be less likely.  Otherwise, no significant interval change is seen since the prior study (or studies).      Impression:   No acute infiltrate is appreciated.     Please note that portions of this note were completed with a voice recognition program.  TIEN BLANCO JR, MD       Electronically Signed and Approved By: TIEN BLANCO JR, MD on 4/06/2023 at 0:01              CT Head Without Contrast Stroke Protocol    Result Date: 4/11/2023  Narrative: PROCEDURE: CT HEAD WO CONTRAST STROKE PROTOCOL  COMPARISON:  [facility], CT, CT HEAD WO CONTRAST STROKE PROTOCOL, 4/05/2023, 22:22.  [facility], CT, CT ANGIOGRAM HEAD W AI ANALYSIS OF LVO, 4/05/2023, 22:52.  [facility], MR, MRI BRAIN WO CONTRAST, 4/06/2023, 14:52. INDICATIONS: Neuro deficit, acute, stroke suspected,increased slurred speech from yesterday  PROTOCOL:   Standard imaging protocol performed    RADIATION:   DLP: 1018.2mGy*cm   MA and/or KV was adjusted to minimize radiation dose.     TECHNIQUE: After obtaining the patient's consent, CT images were obtained without non-ionic intravenous contrast material.  FINDINGS:  Brain:  No acute intracranial hemorrhage  There has been evolution of small infarct within the deep white matter of the left frontal lobe extending to the basal ganglia.  Remote infarcts within the an and midbrain again noted.  Orbits:  Orbits are grossly unremarkable and periorbital soft tissues appear grossly unremarkable.  Sinuses:  Paranasal sinuses are clear.  Mastoid air cells are clear.  Calvarium and soft tissues:  Bony calvarium is intact.  Soft tissues are grossly unremarkable in appearance.       Impression:   1. Evolution of  "previously noted late acute, subacute stroke within the left basal ganglia, deep white matter. 2. No acute hemorrhage noted 3. Remote white matter changes and prior infarcts additionally noted  Findings were called to Dr. Long at 1:04 p.m.     BONG CASTILLO MD       Electronically Signed and Approved By: BONG CASTILLO MD on 4/11/2023 at 13:06             CT Head Without Contrast Stroke Protocol    Result Date: 4/5/2023  Narrative: PROCEDURE: CT HEAD WO CONTRAST STROKE PROTOCOL  COMPARISON: None.  INDICATIONS: 52-YEAR-OLD MALE W/ H/O SLURRED SPEECH, TODAY; THE PATIENT WAS LAST KNOWN TO BE \"WELL\" AT APPROXIMATELY 5:45 A.M. THIS MORNING; H/O STROKE; THE PT. TAKES 81 MG OF ASPIRIN, DAILY; NO OTHER ANTICOAGULATION MEDICATIONS.  PROTOCOL:   Standard CT imaging protocol performed.    RADIATION:   Total DLP: 953.9 mGy*cm   MA and/or KV were/was adjusted to minimize radiation dose.    TECHNIQUE: After obtaining the patient's consent, 122 CT images were obtained without non-ionic intravenous contrast material.  DISCUSSION:  A routine nonenhanced head CT was performed. No acute brain abnormality is identified. No acute intracranial hemorrhage. No acute infarction. No acute skull fracture. No midline shift or acute intracranial mass effect is seen.  Mild chronic small vessel ischemia/infarction is suspected. There are arterial calcifications. The extra-axial spaces and the ventricular system are mildly prominent for the patient's age suggesting some degree of central atrophy.  Mild mucosal thickening involves the imaged paranasal sinuses.  No air-fluid interfaces are seen within the imaged paranasal sinuses.  No significant acute findings are seen with regard to the imaged orbits or middle ear clefts.  There is incidental asymmetric pneumatization of the right petrous apex, which is a normal variant.  Degenerative changes involve the bilateral temporomandibular joints, greater on the right.      Impression:   No acute " brain abnormality is seen.  No acute intracranial hemorrhage.  No acute infarct.    Please note that portions of this note were completed with a voice recognition program.  TIEN BLANCO JR, MD       Electronically Signed and Approved By: TIEN BLANCO JR, MD on 4/05/2023 at 23:37              CT Angiogram Head w AI Analysis of LVO    Result Date: 4/11/2023  Narrative: PROCEDURE: CT ANGIOGRAM HEAD W AI ANALYSIS OF LVO, 4/11/2023, 13:22 CT ANGIOGRAM NECK, 4/11/2023, 13:22  COMPARISON: [facility], CT, CT ANGIOGRAM NECK, 4/05/2023, 22:52.  [facility], CT, CT ANGIOGRAM HEAD W AI ANALYSIS OF LVO, 4/05/2023, 22:52.  [facility], CT, CT CEREBRAL PERFUSION W WO CONTRAST, 4/11/2023, 13:11.  [facility], MR, MRI BRAIN WO CONTRAST, 4/06/2023, 14:52.  [facility], CT, CT HEAD WO CONTRAST STROKE PROTOCOL, 4/11/2023, 12:52.  INDICATIONS: Neuro deficit, acute, stroke suspected,slurred speech  PROTOCOL:   Standard imaging protocol performed    RADIATION:      Automated exposure control was utilized to minimize radiation dose. RAPID: CTA imaging was analyzed using RAPID AI to enable computer assisted triage notification to rapidly detect a large vessel occlusion (LVO) and shorten notification time  TECHNIQUE: After obtaining the patient's consent, CT images of the head were obtained without and with non-ionic contrast, and multi-planar/3-D imaging were created and interpreted to optimize visualization of vascular anatomy.   FINDINGS:   Study is somewhat limited by motion  Vascular:  CT neck:  Limited imaging of the aortic arch is grossly unremarkable in appearance.  The left subclavian artery, left vertebral artery and left common carotid artery are grossly unremarkable in appearance.  Hard plaque measuring less than 50% stenosis at the bifurcation noted.  External and internal carotid arteries remain patent.  Right innominate artery and right subclavian artery are  unremarkable.  Left vertebral artery is smaller than the left and remains patent to the base of the skull.  Perigraft right common carotid artery demonstrates no significant abnormality.  Minimal atherosclerotic change at the bifurcation and internal carotid artery noted without significant stenosis perigraft CT a head:  Posterior circulation remains patent.  Perigraft anterior circulation demonstrates atherosclerotic changes within the carotid siphon bilaterally.  No evidence of large vessel occlusion noted.  The right A1 segment is diminutive with the anterior circulation supplied predominantly from the left.  Bilateral MCAs remain unremarkable.  Nonvascular:  No abnormal enhancement noted.  White matter changes compatible with small lacunar infarcts again noted similar to the prior study within the posterior fossa.  Evolution of infarct within the left basal ganglia noted as mentioned previous same day.  Perigraft limited imaging of the soft tissues of the orbits and the head neck remain grossly unremarkable in appearance.  Perigraft limited imaging of the mediastinum demonstrates no acute abnormality.  Pacemaker remains in place.  Mild emphysematous changes at the lung apices again noted.  Scarring is noted at the lung apices.  Osseous structures are stable      Impression:   1. No evidence of large vessel occlusion, significant stenosis or aneurysm formation.  No significant change from the previous study of vessels within the head neck     BONG CASTILLO MD       Electronically Signed and Approved By: BONG CASTILLO MD on 4/11/2023 at 15:26             CT Angiogram Head w AI Analysis of LVO    Addendum Date: 4/6/2023 Addendum:   ADDENDUM:  Upon further review, in the TECHNIQUE subheading (above), it incorrectly states that the study was performed without and with intravenous contrast agent administration.  Only with intravenous contrast agent was used for this CTA exam.   Please note that portions of this  "note were completed with a voice recognition program.  TIEN BLANCO JR, MD       Electronically Signed and Approved By: TIEN BLANCO JR, MD on 4/06/2023 at 0:47              Result Date: 4/6/2023  Narrative: PROCEDURE: CT ANGIOGRAM HEAD W AI ANALYSIS OF LVO  COMPARISONS: 4/5/2023.  INDICATIONS: 52-YEAR-OLD MALE W/ H/O SLURRED SPEECH, TODAY; THE PATIENT WAS LAST KNOWN TO BE \"WELL\" AT APPROXIMATELY 5:45 A.M. THIS MORNING; H/O STROKE; THE PT. TAKES 81 MG OF ASPIRIN, DAILY; NO OTHER ANTICOAGULATION MEDICATIONS.  PROTOCOL:   Standard CTA imaging protocol performed.    RADIATION:   Total DLP:558 mGy*cm   Automated exposure control was utilized to minimize radiation dose. CONTRAST: 70 mL Isovue 370 I.V. RAPID: CTA imaging was analyzed using RAPID AI to enable computer assisted triage notification to rapidly detect a large vessel occlusion (LVO) and shorten notification time  TECHNIQUE: After obtaining the patient's consent, 808 CT/CTA images of the head were obtained without and with non-ionic contrast, and multi-planar/3-D imaging were created and interpreted to optimize visualization of vascular anatomy.   FINDINGS: No emergent large vessel occlusion is seen.  The left vertebral artery is dominant.  Calcified atherosclerotic plaque involves the bilateral distal internal carotid arteries with suspected (at least) mild-to-moderate stenoses.  Otherwise, no hemodynamically significant stenoses are suspected.  The A1 segment of the right anterior cerebral artery (DEJUAN) is hypoplastic, which is a normal variant.  The anterior communicating artery is patent.  The posterior communicating artery on the left is small in caliber and is thought to be patent.  The right-sided posterior communicating artery is not well seen.  It may be hypoplastic or absent.  There is thought to be mild atherosclerotic change within the posterior divisions of the bilateral P2 segments of the posterior cerebral arteries (PCAs).  There may be " atherosclerotic change more distally within the bilateral PCAs.  Mild atherosclerotic change is suggested involving the bilateral proximal middle cerebral arteries (MCAs) without hemodynamically significant stenoses.  The left vertebral artery is dominant.  The bilateral intradural vertebral arteries and the basilar artery are patent without significant stenoses.  No cerebral aneurysm is suggested.  Incidentally, a left-sided cardiac implantable electronic device (CIED) is identified on the  topogram and may be a contraindication to brain MRI examination.      Impression:  No emergent large vessel occlusion is identified.  Please see above comments for further detail.    Please note that portions of this note were completed with a voice recognition program.  TIEN BLANCO JR, MD       Electronically Signed and Approved By: TIEN BLANCO JR, MD on 4/06/2023 at 0:40             CT CEREBRAL PERFUSION WITH & WITHOUT CONTRAST    Result Date: 4/11/2023  Narrative: PROCEDURE: CT CEREBRAL PERFUSION W WO CONTRAST  COMPARISON: CT, CT ANGIOGRAM NECK, 4/11/2023, 13:22.  [facility], MR, MRI BRAIN WO CONTRAST, 4/06/2023, 14:52.  [facility], CR, XR CHEST 1 VW, 4/05/2023, 23:13.  [facility], CT, CT CEREBRAL PERFUSION W WO CONTRAST, 4/05/2023, 22:46.  INDICATIONS: Neuro deficit, acute, stroke suspected  PROTOCOL:   Standard imaging protocol performed    RADIATION:      Automated exposure control was utilized to minimize radiation dose.   FINDINGS: CBF less than 30% 0 mL.  No evidence of core infarct.  There is a 16 mL area of T-max greater than 6 seconds in the anterior inferior frontal lobes likely artifact.       Impression:  CT perfusion exam demonstrating no evidence core infarct.      MACARIO KENNEDY MD       Electronically Signed and Approved By: MACARIO KENNEDY MD on 4/11/2023 at 13:58             CT CEREBRAL PERFUSION WITH & WITHOUT CONTRAST    Result Date:  "4/5/2023  Narrative: PROCEDURE: CT CEREBRAL PERFUSION W WO CONTRAST  COMPARISONS: 4/5/2023.  INDICATIONS: 52-YEAR-OLD MALE W/ H/O SLURRED SPEECH, TODAY; THE PATIENT WAS LAST KNOWN TO BE \"WELL\" AT APPROXIMATELY 5:45 A.M. THIS MORNING; H/O STROKE; THE PT. TAKES 81 MG OF ASPIRIN, DAILY; NO OTHER ANTICOAGULATION MEDICATIONS.  PROTOCOL:   Standard imaging protocol performed    RADIATION:   DLP: 646.8 mGy*cm   Automated exposure control was utilized to minimize radiation dose. CONTRAST: 80 cc Isovue 370 I.V.  TECHNIQUE:  CT perfusion (CTP) was performed, utilizing a total of 80 mL of nonionic intravenous contrast agent (as indicated above) with an injection rate of 5 mL/s.  A total of 8 cm of brain coverage was used for the CTP study (in two 4 cm \"slabs\").  The images were processed using [employer] software (by [employer] [place], [place]).  The CTP maps, including the cerebral blood volume (CBV), cerebral blood flow (CBF), mean transit time (MTT), time to maximum (Tmax), and time to peak (TTP), were reviewed as well as the summary map(s).  The examination appears to be technically adequate.  There arterial input function (AIF) and the venous output function (VOF) appear to be appropriately placed.  FINDINGS:  TOTAL HYPOPERFUSION:  Using a threshold of Tmax greater than 6 seconds, there is no convincing evidence for an area of acute cerebral hypoperfusion within the imaged brain.   CORE INFARCT:  Using the threshold of CBF less than 30 percent, there is no convincing CTP evidence for an acute ischemic core (infarct).  PENUMBRA:  No penumbra (or tissue at risk, TAR) is appreciated.    IMPRESSION:  There is no convincing CTP (or CT perfusion) evidence for a large-vessel acute brain infarct.   Please note that portions of this note were completed with a voice recognition program.  TIEN BLANCO JR, MD       Electronically Signed and Approved By: TIEN BLANCO JR, MD on 4/05/2023 at 23:39                 Diagnostic " Studies of Note:  MRI of the brain    Risk of readmission: Very high with history of recurrent CVA multiple medical problems diabetes hypertension and noncompliance since tobacco abuse      Time Spent on Discharge: (32+ min which included; examination and discussion with patient. Review of medical record, review of medications, printing of scripts, ensuring adequate followup, along with documentation.    This transcription was electronically signed. Part of this note may be an electronic transcription/translation of spoken language to printed text using the Dragon Dictation .System.       Katya Galloway [email or web address].[facility]   This has been electronically signed by:   _______________________________   @[place]@  4/13/2023  10:11 EDT      At [facility], we believe that sharing information builds trust and better relationships. You are receiving this note because you recently visited [facility]. It is possible you will see health information before a provider has talked with you about it. This kind of information can be easy to misunderstand. To help you fully understand what it means for your health, we urge you to discuss this note with your provider.

## 2023-04-13 NOTE — PLAN OF CARE
Goal Outcome Evaluation:    Pt cooperative with care, [facility] of 3 this shift, blood pressures continue to be soft, hospitalist on call ordered 500 mL bolus of lactated ringers, amp of D50 administered due to low blood sugar, pt compliant with wearing SCD's for VTE prevention.

## 2023-04-13 NOTE — PLAN OF CARE
Goal Outcome Evaluation:  Plan of Care Reviewed With: patient, spouse           Outcome Evaluation: pt discharging home. pt states he has appt with pcp at 3pm today.

## 2023-04-14 NOTE — OUTREACH NOTE
Prep Survey    Flowsheet Row Responses   [facility] facility patient discharged from? [facility]   Is LACE score < 7 ? No   Eligibility Readm Mgmt   Discharge diagnosis Stroke   Does the patient have one of the following disease processes/diagnoses(primary or secondary)? Stroke   Does the patient have Home health ordered? No   Is there a DME ordered? No   Prep survey completed? Yes          Kenzie WYATT - Registered Nurse

## 2023-04-16 NOTE — THERAPY DISCHARGE NOTE
Acute Care - Speech Language Pathology /Discharge  [facility]     Patient Name: Demetrio Mariano  : 1970  MRN: [id number]  Today's Date: 2023               Admit Date: 2023     Visit Dx:    ICD-10-CM ICD-9-CM   1. Cerebrovascular accident (CVA), unspecified mechanism  I63.9 434.91   2. Oropharyngeal dysphagia  R13.12 787.22   3. Dysarthria  R47.1 784.51   4. Decreased activities of daily living (ADL)  Z78.9 V49.89   5. 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   • Nicotine dependence   • Acute CVA (cerebrovascular accident)   • Stroke     Past Medical History:   Diagnosis Date   • CHF (congestive heart failure)    • Coronary artery disease involving native heart without angina pectoris      Nonobstructive coronary artery disease involving diagonal branch per cardiac catheterization done on 2021   • Essential hypertension, benign    • Hypercholesterolemia    • ICD (implantable cardioverter-defibrillator), dual, in situ 2021   • Nicotine dependence    • Non-ischemic cardiomyopathy (CMS/HCC) 2021    Mildly dilated left ventricular cavity with severe global hypokinesis with estimated LV ejection fraction of       28%. 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    • Type I diabetes mellitus, uncontrolled      Past Surgical History:   Procedure Laterality Date   • CARDIAC CATHETERIZATION     • CARDIAC ELECTROPHYSIOLOGY PROCEDURE N/A 06/15/2021    Procedure: ICD SC new;  Surgeon: Regulo Coyne MD;  Location: [facility]  LOCATION;  Service: Cardiovascular;  Laterality: N/A;   • FRACTURE SURGERY Right     crushed heel injury with 14 fractures   • OTHER SURGICAL HISTORY  06/10/2013    plication for treatment of pyronie's disease   • VASECTOMY         SPEECH PATHOLOGY DISCHARGE SUMMARY      DIAGNOSIS: Dysarthria    SUBJECTIVE: 52-year-old male with complaint of increased difficulty speaking.    PAIN: N/A    OBJECTIVE DATA/ CARE AND TREATMENT RECEIVED: Patient received evaluation for dysarthria.  LTG 1: Not met. STG 1A through 1E: Not met. LTG     PATIENT STATUS AT DISCHARGE: Same as at evaluation    INSTRUCTIONS GIVEN TO PATIENT AND FAMILY: Patient instructed for home exercise program and strategies to improve intelligibility.    FUNCTIONAL ASSESSMENT INSTRUMENT: Patient currently scored a level 5 of 7 on Functional Communication Measures for motor speech indicating a 20-39% limitation in function on a projected goal of 1-19% limitation.    ASSESSMENT: Dysarthria characterized by decreased intelligibility, decreased oral motor strength and coordination, decreased respiratory support for phonation.    REASON FOR DISCHARGE: Patient discharged from hospital    DISCHARGE PLAN/ RECOMMENDATIONS: Recommend followup speech pathology services.    DISCHARGE DATE: 4/13/2023    TOTAL VISITS: 1 evaluation only                                                                                     Time Calculation:                    SLP Discharge Summary  Anticipated Discharge Disposition (SLP): home with assist    ISAURA Trevino  4/16/2023

## 2023-04-17 LAB — GLUCOSE BLDC GLUCOMTR-MCNC: 155 MG/DL (ref 70–99)

## 2023-04-18 ENCOUNTER — READMISSION MANAGEMENT (OUTPATIENT)
Dept: CALL CENTER | Facility: HOSPITAL | Age: 53
End: 2023-04-18
Payer: COMMERCIAL

## 2023-04-18 NOTE — OUTREACH NOTE
Stroke Week 1 Survey    Flowsheet Row Responses   [facility] facility patient discharged from? [facility]   Does the patient have one of the following disease processes/diagnoses(primary or secondary)? Stroke   Week 1 attempt successful? No   Unsuccessful attempts Attempt 1          Tracy Hernández Registered Nurse

## 2023-05-01 ENCOUNTER — OFFICE VISIT (OUTPATIENT)
Dept: CARDIOLOGY | Facility: CLINIC | Age: 53
End: 2023-05-01
Payer: COMMERCIAL

## 2023-05-01 VITALS
HEART RATE: 84 BPM | DIASTOLIC BLOOD PRESSURE: 67 MMHG | WEIGHT: 162.3 LBS | SYSTOLIC BLOOD PRESSURE: 113 MMHG | HEIGHT: 67 IN | BODY MASS INDEX: 25.47 KG/M2

## 2023-05-01 DIAGNOSIS — Z95.810 ICD (IMPLANTABLE CARDIOVERTER-DEFIBRILLATOR), DUAL, IN SITU: Chronic | ICD-10-CM

## 2023-05-01 DIAGNOSIS — I42.8 NON-ISCHEMIC CARDIOMYOPATHY: Primary | ICD-10-CM

## 2023-05-01 DIAGNOSIS — Z95.818 IMPLANTABLE LOOP RECORDER PRESENT: ICD-10-CM

## 2023-05-01 DIAGNOSIS — I63.9 CEREBROVASCULAR ACCIDENT (CVA), UNSPECIFIED MECHANISM: ICD-10-CM

## 2023-05-01 DIAGNOSIS — E78.2 MIXED HYPERLIPIDEMIA: ICD-10-CM

## 2023-05-01 DIAGNOSIS — I25.10 CORONARY ARTERY DISEASE INVOLVING NATIVE CORONARY ARTERY OF NATIVE HEART WITHOUT ANGINA PECTORIS: ICD-10-CM

## 2023-05-01 RX ORDER — TICAGRELOR 90 MG/1
1 TABLET ORAL EVERY 12 HOURS SCHEDULED
COMMUNITY
Start: 2023-04-13 | End: 2023-05-01

## 2023-05-01 NOTE — PROGRESS NOTES
Chief Complaint  Cardiomyopathy, Coronary Artery Disease, Follow-up (CVA), Hypertension, and Hyperlipidemia    Subjective        History of Present Illness  Demetrio Mariano presents to [facility] CARDIOLOGY   Mr. Mariano is a 52-year-old male patient coming in for follow-up after recent hospitalization for CVA.  He was admitted to [facility] in January for pontine ischemic stroke.  At that time he had loop recorder device implanted.  He reports no residual from the stroke.  He then presented to [facility] on April 5 with left basal ganglia stroke, with slurring of speech and balance and numbness in left face and right leg.  He was discharged home at that time on Plavix and aspirin.  He reports he initially felt like his symptoms had improved.  Unfortunately he presented back to the ER on April 11 with new onset of right arm and leg weakness, right-sided facial droop, and difficulty with speech.  He reports the symptoms have persisted past discharge.  He currently is not doing any speech, OT or PT therapies.  He was discharged home from the hospital to take aspirin and Plavix, since that time his PCP has went ahead and changed him to Brilinta as he had worsening symptoms with previous antiplatelet medications on board.  He does have follow-up with neurology next week.  PCP has placed referral to heme-onc as well.  Outside of his recent CVA related concerns, he is not experiencing any chest pains, shortness of breath, palpitations, or lower extremity swelling.      Loop recorder device in Jan at [facility]  [employer] Reveal LINQII   Model: LNQ22, Serial: [id number]   Will also need heme onc referall       Past History:     1) Dilated cardiomyopathy with LV ejection fraction of 20-25%. 2) Nonobstructive coronary artery disease involving diagonal branch per cardiac catheterization done on 01/21/2021. 3) Diabetes mellitus, on insulin pump. 4) History of medication noncompliance. 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    • Type I diabetes mellitus, uncontrolled        No Known Allergies     Past Surgical History:   Procedure Laterality Date   • CARDIAC CATHETERIZATION     • CARDIAC ELECTROPHYSIOLOGY PROCEDURE N/A 06/15/2021    Procedure: ICD SC new;  Surgeon: Regulo Coyne MD;  Location: [facility] INVASIVE LOCATION;  Service: Cardiovascular;  Laterality: N/A;   • FRACTURE SURGERY Right     crushed heel injury with 14 fractures   • OTHER SURGICAL HISTORY  06/10/2013    plication for treatment of pyronie's disease   • VASECTOMY          Social History  He  reports that he quit smoking about 3 weeks ago. His smoking use included cigarettes. He started smoking about 37 years ago. He has a 37.00 pack-year smoking history. He has been exposed to tobacco smoke. He has never used smokeless tobacco. He reports that he does not drink alcohol and does not use drugs.    Family History  His family history includes Diabetes in his father and mother.       Current Outpatient Medications on File Prior to Visit   Medication Sig   • atorvastatin (LIPITOR) 80 MG tablet Take 1 tablet by mouth Every Night for 30 days.   • carvedilol (COREG) 25 MG tablet Take 1 tablet by mouth 2 (Two) Times a Day.   • Continuous Blood Gluc Sensor (FreeStyle Francis 2 Sensor) misc 1 each Every 14 (Fourteen) Days.   • furosemide (LASIX) 20 MG tablet Take 1 tablet by mouth Daily.   • Glucagon (Baqsimi Two Pack) 3 MG/DOSE powder 3 mg into the nostril(s) as  "directed by provider As Needed (Severe hypoglycemia).   • Insulin Aspart (NovoLOG) 100 UNIT/ML injection Up to 100 per day per insulin pump   • insulin patient supplied pump Inject  under the skin into the appropriate area as directed Continuous. Type of Insulin: NOVOLOG  Basal Dose:   12 AM to 6 AM - 2.15u/hr  6 AM to 3 PM - 1.9u/hr  3 PM to 12 AM - 2.115u/hr    Bolus Dose: CORRECTION RATIO 1:40  Prescriber:KATHERINE ROWLAND  Phone number:  Next refill: 4/14/23  MEDTRONIC PUMP   • nicotine (NICODERM CQ) 14 MG/24HR patch Place 1 patch on the skin as directed by provider Daily.   • sacubitril-valsartan (ENTRESTO)  MG tablet Take 1 tablet by mouth 2 (Two) Times a Day.   • spironolactone (ALDACTONE) 25 MG tablet Take 1 tablet by mouth Daily.   • [DISCONTINUED] Brilinta 90 MG tablet tablet Take 1 tablet by mouth Every 12 (Twelve) Hours.   • [DISCONTINUED] clopidogrel (PLAVIX) 75 MG tablet Take 1 tablet by mouth Daily. (Patient not taking: Reported on 5/1/2023)     No current facility-administered medications on file prior to visit.         Review of Systems     Objective   Vitals:    05/01/23 1502   BP: 113/67   Pulse: 84   Weight: 73.6 kg (162 lb 4.8 oz)   Height: 170.2 cm (67\")         Physical Exam  General : Alert, awake, no acute distress  Neck : Supple, no carotid bruit, no jugular venous distention  CVS : Regular rate and rhythm, no murmur, rubs or gallops  Lungs: Clear to auscultation bilaterally, no crackles or rhonchi  Abdomen: Soft, nontender, bowel sounds active  Extremities: Warm, well-perfused, no pedal edema      Result Review     The following data was reviewed by ZEINAB Aguilera  No results found for: PROBNP  CMP        4/6/2023    11:18 4/11/2023    13:03 4/13/2023    05:31   CMP   Glucose 253   189   93     BUN 12   20   23     Creatinine 0.73   0.78   0.79     EGFR 110.2   108.0   107.6     Sodium 138   137   141     Potassium 3.7   4.5   4.0     Chloride 104   100   109     Calcium 8.7   9.2  "  8.7     Total Protein 6.3   7.2      Albumin 3.9   4.6      Globulin 2.4   2.6      Total Bilirubin 0.8   0.7      Alkaline Phosphatase 97   109      AST (SGOT) 14   20      ALT (SGPT) 16   33      Albumin/Globulin Ratio 1.6   1.8      BUN/Creatinine Ratio 16.4   25.6   29.1     Anion Gap 8.5   12.7   10.0       CBC w/diff        4/6/2023    11:18 4/11/2023    13:03 4/13/2023    05:31   CBC w/Diff   WBC 7.34   11.68   8.71     RBC 3.90   4.14   4.00     Hemoglobin 12.4   13.2   12.7     Hematocrit 34.3   37.0   35.7     MCV 87.9   89.4   89.3     MCH 31.8   31.9   31.8     MCHC 36.2   35.7   35.6     RDW 13.2   13.2   13.2     Platelets 268   310   323     Neutrophil Rel % 63.8   65.5      Immature Granulocyte Rel % 0.1   0.3      Lymphocyte Rel % 23.0   24.5      Monocyte Rel % 9.3   6.8      Eosinophil Rel % 3.3   2.4      Basophil Rel % 0.5   0.5         No results found for: TSH   No results found for: FREET4   No results found for: DDIMERQUANT  Magnesium   Date Value Ref Range Status   04/13/2023 2.0 1.6 - 2.6 mg/dL Final      No results found for: DIGOXIN   Lab Results   Component Value Date    TROPONINT 12 04/11/2023           Lipid Panel        4/7/2023    04:57 4/11/2023    13:03   Lipid Panel   Total Cholesterol 189   146     Triglycerides 90   73     HDL Cholesterol 41   48     VLDL Cholesterol 17   14     LDL Cholesterol  131   84     LDL/HDL Ratio 3.17   1.74         Results for orders placed during the hospital encounter of 04/11/23    Adult Transthoracic Echo Complete W/ Cont if Necessary Per Protocol (With Agitated Saline)    Interpretation Summary  Borderline normal left ventricular systolic function ejection fraction around 50%.  Trace MR and trace TR.             Assessment and Plan               Follow Up   Return in about 6 months (around 11/1/2023) for with Dr. Nagle.    Patient was given instructions and counseling regarding his condition or for health maintenance advice. Please see  specific information pulled into the AVS if appropriate.     Signed,  Danielle Dawson, APRN  05/01/2023     Dictated Utilizing Dragon Dictation: Please note that portions of this note were completed with a voice recognition program.  Part of this note may be an electronic transcription/translation of spoken language to printed text using the Dragon Dictation System.

## 2023-05-01 NOTE — LETTER
May 1, 2023       No Recipients    Patient: Demetrio Mariano   YOB: 1970   Date of Visit: 5/1/2023       Dear Dr. Renteria Recipients:    Thank you for referring Demetrio Mariano to me for evaluation. Below are the relevant portions of my assessment and plan of care.    If you have questions, please do not hesitate to call me. I look forward to following Demetrio along with you.         Sincerely,        ZEINAB Aguilera        CC:   No Recipients    Danielle Dawson APRN  05/01/23 2115  Sign when Signing Visit  Chief Complaint  Cardiomyopathy, Coronary Artery Disease, Follow-up (CVA), Hypertension, and Hyperlipidemia    Subjective         History of Present Illness  Demetrio Mariano presents to [facility] CARDIOLOGY   Mr. Mariano  is a 52-year-old male patient coming in for follow-up after recent hospitalization for CVA.  He was admitted to [facility] in January for pontine ischemic stroke.  At that time he had loop recorder device implanted.  He reports no residual from the stroke.  He then presented to [facility] on April 5 with left basal ganglia stroke, with slurring of speech and balance and numbness in left face and right leg.  He was discharged home at that time on Plavix and aspirin.  He reports he initially felt like his symptoms had improved.  Unfortunately he presented back to the ER on April 11 with new onset of right arm and leg weakness, right-sided facial droop, and difficulty with speech.  He reports the symptoms have persisted past discharge.  He currently is not doing any speech, OT or PT therapies.  He was discharged home from the hospital to take aspirin and Plavix, since that time his PCP has went ahead and changed him to Brilinta as he had worsening symptoms with previous antiplatelet medications on board.  He does have follow-up with neurology next week.  PCP has placed referral to heme-onc as well.  Outside of his recent CVA related concerns, he is not  experiencing any chest pains, shortness of breath, palpitations, or lower extremity swelling.    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    • Type I diabetes mellitus, uncontrolled        No Known Allergies     Past Surgical History:   Procedure Laterality Date   • CARDIAC CATHETERIZATION     • CARDIAC ELECTROPHYSIOLOGY PROCEDURE N/A 06/15/2021    Procedure: ICD SC new;  Surgeon: Regulo Coyne MD;  Location: [facility] INVASIVE LOCATION;  Service: Cardiovascular;  Laterality: N/A;   • FRACTURE SURGERY Right     crushed heel injury with 14 fractures   • OTHER SURGICAL HISTORY  06/10/2013    plication for treatment of pyronie's disease   • VASECTOMY          Social History  He  reports that he quit smoking about 3 weeks ago. His smoking use included cigarettes. He started smoking about 37 years ago. He has a 37.00 pack-year smoking history. He has been exposed to tobacco smoke. He has never used smokeless tobacco. He reports that he does not drink alcohol and does not use drugs.    Family History  His family history includes Diabetes in his father and mother.       Current Outpatient Medications on File Prior to Visit   Medication Sig   • atorvastatin (LIPITOR) 80 MG tablet Take 1 tablet by mouth Every Night for 30 days.   • carvedilol (COREG) 25 MG tablet Take 1 tablet by mouth 2 (Two) Times a Day.   • Continuous Blood Gluc Sensor (FreeStyle Francis 2 Sensor) misc 1 each Every 14 (Fourteen) Days.   • furosemide (LASIX) 20 MG tablet Take 1 tablet by mouth Daily.   • Glucagon (Baqsimi Two Pack) 3  "MG/DOSE powder 3 mg into the nostril(s) as directed by provider As Needed (Severe hypoglycemia).   • Insulin Aspart (NovoLOG) 100 UNIT/ML injection Up to 100 per day per insulin pump   • insulin patient supplied pump Inject  under the skin into the appropriate area as directed Continuous. Type of Insulin: NOVOLOG  Basal Dose:   12 AM to 6 AM - 2.15u/hr  6 AM to 3 PM - 1.9u/hr  3 PM to 12 AM - 2.115u/hr    Bolus Dose: CORRECTION RATIO 1:40  Prescriber:KATHERINE ROWLAND  Phone number:  Next refill: 4/14/23  MEDTRONIC PUMP   • nicotine (NICODERM CQ) 14 MG/24HR patch Place 1 patch on the skin as directed by provider Daily.   • sacubitril-valsartan (ENTRESTO)  MG tablet Take 1 tablet by mouth 2 (Two) Times a Day.   • spironolactone (ALDACTONE) 25 MG tablet Take 1 tablet by mouth Daily.   • [DISCONTINUED] Brilinta 90 MG tablet tablet Take 1 tablet by mouth Every 12 (Twelve) Hours.   • [DISCONTINUED] clopidogrel (PLAVIX) 75 MG tablet Take 1 tablet by mouth Daily. (Patient not taking: Reported on 5/1/2023)     No current facility-administered medications on file prior to visit.         Review of Systems   Constitutional: Positive for fatigue.   Respiratory: Negative for cough, chest tightness and shortness of breath.    Cardiovascular: Negative for chest pain, palpitations and leg swelling.   Gastrointestinal: Negative for nausea and vomiting.   Musculoskeletal: Positive for gait problem.   Neurological: Positive for facial asymmetry and speech difficulty. Negative for dizziness and syncope.   Hematological: Does not bruise/bleed easily.        Objective    Vitals:    05/01/23 1502   BP: 113/67   Pulse: 84   Weight: 73.6 kg (162 lb 4.8 oz)   Height: 170.2 cm (67\")         Physical Exam  General : Alert, awake, no acute distress, mild facial asymmetry  Neck : Supple, no carotid bruit, no jugular venous distention  CVS : Regular rate and rhythm, no murmur, rubs or gallops  Lungs: Clear to auscultation bilaterally, no " crackles or rhonchi  Abdomen: Soft, nontender, bowel sounds active  Extremities: Warm, well-perfused, no pedal edema  Musculoskeletal: Right-sided weakness, gait slow and difficult    Result Review     The following data was reviewed by ZEINAB Aguilera  No results found for: PROBNP  CMP        4/6/2023    11:18 4/11/2023    13:03 4/13/2023    05:31   CMP   Glucose 253   189   93     BUN 12   20   23     Creatinine 0.73   0.78   0.79     EGFR 110.2   108.0   107.6     Sodium 138   137   141     Potassium 3.7   4.5   4.0     Chloride 104   100   109     Calcium 8.7   9.2   8.7     Total Protein 6.3   7.2      Albumin 3.9   4.6      Globulin 2.4   2.6      Total Bilirubin 0.8   0.7      Alkaline Phosphatase 97   109      AST (SGOT) 14   20      ALT (SGPT) 16   33      Albumin/Globulin Ratio 1.6   1.8      BUN/Creatinine Ratio 16.4   25.6   29.1     Anion Gap 8.5   12.7   10.0       CBC w/diff        4/6/2023    11:18 4/11/2023    13:03 4/13/2023    05:31   CBC w/Diff   WBC 7.34   11.68   8.71     RBC 3.90   4.14   4.00     Hemoglobin 12.4   13.2   12.7     Hematocrit 34.3   37.0   35.7     MCV 87.9   89.4   89.3     MCH 31.8   31.9   31.8     MCHC 36.2   35.7   35.6     RDW 13.2   13.2   13.2     Platelets 268   310   323     Neutrophil Rel % 63.8   65.5      Immature Granulocyte Rel % 0.1   0.3      Lymphocyte Rel % 23.0   24.5      Monocyte Rel % 9.3   6.8      Eosinophil Rel % 3.3   2.4      Basophil Rel % 0.5   0.5         No results found for: TSH   No results found for: FREET4   No results found for: DDIMERQUANT  Magnesium   Date Value Ref Range Status   04/13/2023 2.0 1.6 - 2.6 mg/dL Final      No results found for: DIGOXIN   Lab Results   Component Value Date    TROPONINT 12 04/11/2023           Lipid Panel        4/7/2023    04:57 4/11/2023    13:03   Lipid Panel   Total Cholesterol 189   146     Triglycerides 90   73     HDL Cholesterol 41   48     VLDL Cholesterol 17   14     LDL Cholesterol  131   84      LDL/HDL Ratio 3.17   1.74         Results for orders placed during the hospital encounter of 04/11/23    Adult Transthoracic Echo Complete W/ Cont if Necessary Per Protocol (With Agitated Saline)    Interpretation Summary  Borderline normal left ventricular systolic function ejection fraction around 50%.  Trace MR and trace TR.            Assessment and Plan   Diagnoses and all orders for this visit:    1. Non-ischemic cardiomyopathy (Primary)  Assessment & Plan:  Most recent echocardiogram shows EF of nearly 50%, continuing to improve.  Continue carvedilol, Entresto, furosemide and spironolactone.  Patient does note recently his BP is significantly improved since his CVA, if he begins having low blood pressures, or symptoms of hypotension we can readjust medicines as needed.  Recent electrolytes and renal function are grossly normal.      2. Cerebrovascular accident (CVA), unspecified mechanism  Assessment & Plan:  Discussed from cardiac standpoint evaluating for potential etiologies, he had recent echocardiogram in January as well as in April, and no evidence of thrombus or intra-atrial shunting.  Echocardiogram in January did have negative bubble study.  We will arrange to have loop recorder monitoring transferred to us so we can evaluate for any underlying arrhythmias which might be a potential etiology.  I personally called and spoke with PCP to evaluate if he has heme-onc referral  (he did have elevated H&H while in hospital in January), to consider any hypercoagulable state.  In addition she will be arranging for him to have evaluation with speech/PT/ OT.   Stressed the importance of follow-up with neurology, he is having a cost issue related to Brilinta, will provide stable today, will defer management to neuro for long-term antiplatelet therapy at this point.    Orders:  -  ticagrelor (Brilinta) 90 MG tablet tablet; Take 1 tablet by mouth Daily. 10 bottles   45312-83  05-  Dispense: 60 tablet;  Refill: 0    3. Implantable loop recorder present  Assessment & Plan:  We will arrange to have loop recorder monitoring transferred to our office.      4. Coronary artery disease involving native coronary artery of native heart without angina pectoris  Assessment & Plan:  Stable, no symptoms of angina.  Continue beta-blocker and statin therapy.  Aspirin recently discontinued, he is now on Brilinta due to his recent CVA.      5. Mixed hyperlipidemia  Assessment & Plan:  Most recent LDL 84, statin therapy recently maximized to 80 atorvastatin 80 mg nightly.  Continue current dose, and will reevaluate at next follow-up visit.      6. ICD (implantable cardioverter-defibrillator), dual, in situ  Assessment & Plan:  SCD device interrogated in the office today, functioning normally with no events noted.  Continue home remote monitoring.                Follow Up   Return in about 6 months (around 11/1/2023) for with Dr. Nagel.    Patient was given instructions and counseling regarding his condition or for health maintenance advice. Please see specific information pulled into the AVS if appropriate.     Signed,  Danielle Dawson, APRN  05/01/2023     Dictated Utilizing Dragon Dictation: Please note that portions of this note were completed with a voice recognition program.  Part of this note may be an electronic transcription/translation of spoken language to printed text using the Dragon Dictation System.

## 2023-05-02 ENCOUNTER — READMISSION MANAGEMENT (OUTPATIENT)
Dept: CALL CENTER | Facility: HOSPITAL | Age: 53
End: 2023-05-02
Payer: COMMERCIAL

## 2023-05-02 NOTE — ASSESSMENT & PLAN NOTE
Discussed from cardiac standpoint evaluating for potential etiologies, he had recent echocardiogram in January as well as in April, and no evidence of thrombus or intra-atrial shunting.  Echocardiogram in January did have negative bubble study.  We will arrange to have loop recorder monitoring transferred to us so we can evaluate for any underlying arrhythmias which might be a potential etiology.  I personally called and spoke with PCP to evaluate if he has heme-onc referral  (he did have elevated H&H while in hospital in January), to consider any hypercoagulable state.  In addition she will be arranging for him to have evaluation with speech/PT/ OT.   Stressed the importance of follow-up with neurology, he is having a cost issue related to Brilinta, will provide stable today, will defer management to neuro for long-term antiplatelet therapy at this point.

## 2023-05-02 NOTE — PROGRESS NOTES
Chief Complaint  Cardiomyopathy, Coronary Artery Disease, Follow-up (CVA), Hypertension, and Hyperlipidemia    Subjective        History of Present Illness  Demetrio Mariano presents to [facility] CARDIOLOGY   Mr. Mariano  is a 52-year-old male patient coming in for follow-up after recent hospitalization for CVA.  He was admitted to [facility] in January for pontine ischemic stroke.  At that time he had loop recorder device implanted.  He reports no residual from the stroke.  He then presented to [facility] on April 5 with left basal ganglia stroke, with slurring of speech and balance and numbness in left face and right leg.  He was discharged home at that time on Plavix and aspirin.  He reports he initially felt like his symptoms had improved.  Unfortunately he presented back to the ER on April 11 with new onset of right arm and leg weakness, right-sided facial droop, and difficulty with speech.  He reports the symptoms have persisted past discharge.  He currently is not doing any speech, OT or PT therapies.  He was discharged home from the hospital to take aspirin and Plavix, since that time his PCP has went ahead and changed him to Brilinta as he had worsening symptoms with previous antiplatelet medications on board.  He does have follow-up with neurology next week.  PCP has placed referral to heme-onc as well.  Outside of his recent CVA related concerns, he is not experiencing any chest pains, shortness of breath, palpitations, or lower extremity swelling.    Past Medical History:   Diagnosis Date   • CHF (congestive heart failure)    • Coronary artery disease involving native heart without angina pectoris    • Essential hypertension, benign    • Hypercholesterolemia    • ICD (implantable cardioverter-defibrillator), dual, in situ 09/22/2021   • Nicotine dependence    • Non-ischemic cardiomyopathy (CMS/HCC) 05/03/2021   • Peyronie's disease    • Seizures    • Stroke    • Type 1 diabetes mellitus with  vascular disease    • Type I diabetes mellitus, uncontrolled        No Known Allergies     Past Surgical History:   Procedure Laterality Date   • CARDIAC CATHETERIZATION     • CARDIAC ELECTROPHYSIOLOGY PROCEDURE N/A 06/15/2021    Procedure: ICD SC new;  Surgeon: Regulo Coyne MD;  Location: [facility] INVASIVE LOCATION;  Service: Cardiovascular;  Laterality: N/A;   • FRACTURE SURGERY Right     crushed heel injury with 14 fractures   • OTHER SURGICAL HISTORY  06/10/2013    plication for treatment of pyronie's disease   • VASECTOMY          Social History  He  reports that he quit smoking about 3 weeks ago. His smoking use included cigarettes. He started smoking about 37 years ago. He has a 37.00 pack-year smoking history. He has been exposed to tobacco smoke. He has never used smokeless tobacco. He reports that he does not drink alcohol and does not use drugs.    Family History  His family history includes Diabetes in his father and mother.       Current Outpatient Medications on File Prior to Visit   Medication Sig   • atorvastatin (LIPITOR) 80 MG tablet Take 1 tablet by mouth Every Night for 30 days.   • carvedilol (COREG) 25 MG tablet Take 1 tablet by mouth 2 (Two) Times a Day.   • Continuous Blood Gluc Sensor (FreeStyle Francis 2 Sensor) misc 1 each Every 14 (Fourteen) Days.   • furosemide (LASIX) 20 MG tablet Take 1 tablet by mouth Daily.   • Glucagon (Baqsimi Two Pack) 3 MG/DOSE powder 3 mg into the nostril(s) as directed by provider As Needed (Severe hypoglycemia).   • Insulin Aspart (NovoLOG) 100 UNIT/ML injection Up to 100 per day per insulin pump   • insulin patient supplied pump Inject  under the skin into the appropriate area as directed Continuous. Type of Insulin: NOVOLOG  Basal Dose:   12 AM to 6 AM - 2.15u/hr  6 AM to 3 PM - 1.9u/hr  3 PM to 12 AM - 2.115u/hr    Bolus Dose: CORRECTION RATIO 1:40  Prescriber:KATHERINE ROWLAND  Phone number:  Next refill: 4/14/23  MEDTRONIC PUMP   • nicotine (NICODERM CQ) 14  "MG/24HR patch Place 1 patch on the skin as directed by provider Daily.   • sacubitril-valsartan (ENTRESTO)  MG tablet Take 1 tablet by mouth 2 (Two) Times a Day.   • spironolactone (ALDACTONE) 25 MG tablet Take 1 tablet by mouth Daily.   • [DISCONTINUED] Brilinta 90 MG tablet tablet Take 1 tablet by mouth Every 12 (Twelve) Hours.   • [DISCONTINUED] clopidogrel (PLAVIX) 75 MG tablet Take 1 tablet by mouth Daily. (Patient not taking: Reported on 5/1/2023)     No current facility-administered medications on file prior to visit.         Review of Systems   Constitutional: Positive for fatigue.   Respiratory: Negative for cough, chest tightness and shortness of breath.    Cardiovascular: Negative for chest pain, palpitations and leg swelling.   Gastrointestinal: Negative for nausea and vomiting.   Musculoskeletal: Positive for gait problem.   Neurological: Positive for facial asymmetry and speech difficulty. Negative for dizziness and syncope.   Hematological: Does not bruise/bleed easily.        Objective   Vitals:    05/01/23 1502   BP: 113/67   Pulse: 84   Weight: 73.6 kg (162 lb 4.8 oz)   Height: 170.2 cm (67\")         Physical Exam  General : Alert, awake, no acute distress, mild facial asymmetry  Neck : Supple, no carotid bruit, no jugular venous distention  CVS : Regular rate and rhythm, no murmur, rubs or gallops  Lungs: Clear to auscultation bilaterally, no crackles or rhonchi  Abdomen: Soft, nontender, bowel sounds active  Extremities: Warm, well-perfused, no pedal edema  Musculoskeletal: Right-sided weakness, gait slow and difficult    Result Review     The following data was reviewed by ZEINAB Aguilera  No results found for: PROBNP  CMP        4/6/2023    11:18 4/11/2023    13:03 4/13/2023    05:31   CMP   Glucose 253   189   93     BUN 12   20   23     Creatinine 0.73   0.78   0.79     EGFR 110.2   108.0   107.6     Sodium 138   137   141     Potassium 3.7   4.5   4.0     Chloride 104   100   109  "    Calcium 8.7   9.2   8.7     Total Protein 6.3   7.2      Albumin 3.9   4.6      Globulin 2.4   2.6      Total Bilirubin 0.8   0.7      Alkaline Phosphatase 97   109      AST (SGOT) 14   20      ALT (SGPT) 16   33      Albumin/Globulin Ratio 1.6   1.8      BUN/Creatinine Ratio 16.4   25.6   29.1     Anion Gap 8.5   12.7   10.0       CBC w/diff        4/6/2023    11:18 4/11/2023    13:03 4/13/2023    05:31   CBC w/Diff   WBC 7.34   11.68   8.71     RBC 3.90   4.14   4.00     Hemoglobin 12.4   13.2   12.7     Hematocrit 34.3   37.0   35.7     MCV 87.9   89.4   89.3     MCH 31.8   31.9   31.8     MCHC 36.2   35.7   35.6     RDW 13.2   13.2   13.2     Platelets 268   310   323     Neutrophil Rel % 63.8   65.5      Immature Granulocyte Rel % 0.1   0.3      Lymphocyte Rel % 23.0   24.5      Monocyte Rel % 9.3   6.8      Eosinophil Rel % 3.3   2.4      Basophil Rel % 0.5   0.5         No results found for: TSH   No results found for: FREET4   No results found for: DDIMERQUANT  Magnesium   Date Value Ref Range Status   04/13/2023 2.0 1.6 - 2.6 mg/dL Final      No results found for: DIGOXIN   Lab Results   Component Value Date    TROPONINT 12 04/11/2023           Lipid Panel        4/7/2023    04:57 4/11/2023    13:03   Lipid Panel   Total Cholesterol 189   146     Triglycerides 90   73     HDL Cholesterol 41   48     VLDL Cholesterol 17   14     LDL Cholesterol  131   84     LDL/HDL Ratio 3.17   1.74         Results for orders placed during the hospital encounter of 04/11/23    Adult Transthoracic Echo Complete W/ Cont if Necessary Per Protocol (With Agitated Saline)    Interpretation Summary  Borderline normal left ventricular systolic function ejection fraction around 50%.  Trace MR and trace TR.             Assessment and Plan   Diagnoses and all orders for this visit:    1. Non-ischemic cardiomyopathy (Primary)  Assessment & Plan:  Most recent echocardiogram shows EF of nearly 50%, continuing to improve.  Continue  carvedilol, Entresto, furosemide and spironolactone.  Patient does note recently his BP is significantly improved since his CVA, if he begins having low blood pressures, or symptoms of hypotension we can readjust medicines as needed.  Recent electrolytes and renal function are grossly normal.      2. Cerebrovascular accident (CVA), unspecified mechanism  Assessment & Plan:  Discussed from cardiac standpoint evaluating for potential etiologies, he had recent echocardiogram in January as well as in April, and no evidence of thrombus or intra-atrial shunting.  Echocardiogram in January did have negative bubble study.  We will arrange to have loop recorder monitoring transferred to us so we can evaluate for any underlying arrhythmias which might be a potential etiology.  I personally called and spoke with PCP to evaluate if he has heme-onc referral  (he did have elevated H&H while in hospital in January), to consider any hypercoagulable state.  In addition she will be arranging for him to have evaluation with speech/PT/ OT.   Stressed the importance of follow-up with neurology, he is having a cost issue related to Brilinta, will provide stable today, will defer management to neuro for long-term antiplatelet therapy at this point.    Orders:  -  ticagrelor (Brilinta) 90 MG tablet tablet; Take 1 tablet by mouth Daily. 10 bottles   [id number]  05-  Dispense: 60 tablet; Refill: 0    3. Implantable loop recorder present  Assessment & Plan:  We will arrange to have loop recorder monitoring transferred to our office.      4. Coronary artery disease involving native coronary artery of native heart without angina pectoris  Assessment & Plan:  Stable, no symptoms of angina.  Continue beta-blocker and statin therapy.  Aspirin recently discontinued, he is now on Brilinta due to his recent CVA.      5. Mixed hyperlipidemia  Assessment & Plan:  Most recent LDL 84, statin therapy recently maximized to 80 atorvastatin 80 mg  nightly.  Continue current dose, and will reevaluate at next follow-up visit.      6. ICD (implantable cardioverter-defibrillator), dual, in situ  Assessment & Plan:  SCD device interrogated in the office today, functioning normally with no events noted.  Continue home remote monitoring.                Follow Up   Return in about 6 months (around 11/1/2023) for with Dr. Nagel.    Patient was given instructions and counseling regarding his condition or for health maintenance advice. Please see specific information pulled into the AVS if appropriate.     Signed,  Danielle Dawson, APRN  05/01/2023     Dictated Utilizing Dragon Dictation: Please note that portions of this note were completed with a voice recognition program.  Part of this note may be an electronic transcription/translation of spoken language to printed text using the Dragon Dictation System.

## 2023-05-02 NOTE — ASSESSMENT & PLAN NOTE
Stable, no symptoms of angina.  Continue beta-blocker and statin therapy.  Aspirin recently discontinued, he is now on Brilinta due to his recent CVA.

## 2023-05-02 NOTE — ASSESSMENT & PLAN NOTE
Most recent echocardiogram shows EF of nearly 50%, continuing to improve.  Continue carvedilol, Entresto, furosemide and spironolactone.  Patient does note recently his BP is significantly improved since his CVA, if he begins having low blood pressures, or symptoms of hypotension we can readjust medicines as needed.  Recent electrolytes and renal function are grossly normal.

## 2023-05-02 NOTE — ASSESSMENT & PLAN NOTE
Most recent LDL 84, statin therapy recently maximized to 80 atorvastatin 80 mg nightly.  Continue current dose, and will reevaluate at next follow-up visit.

## 2023-05-02 NOTE — ASSESSMENT & PLAN NOTE
SCD device interrogated in the office today, functioning normally with no events noted.  Continue home remote monitoring.

## 2023-05-02 NOTE — OUTREACH NOTE
Stroke Week 3 Survey    Flowsheet Row Responses   [facility] facility patient discharged from? He   Does the patient have one of the following disease processes/diagnoses(primary or secondary)? Stroke   Week 3 attempt successful? No   Unsuccessful attempts Attempt 1          Tracy Hernández Registered Nurse

## 2023-05-05 ENCOUNTER — READMISSION MANAGEMENT (OUTPATIENT)
Dept: CALL CENTER | Facility: HOSPITAL | Age: 53
End: 2023-05-05
Payer: COMMERCIAL

## 2023-05-05 NOTE — OUTREACH NOTE
Stroke Week 3 Survey    Flowsheet Row Responses   [facility] facility patient discharged from? [facility]   Does the patient have one of the following disease processes/diagnoses(primary or secondary)? Stroke   Week 3 attempt successful? No   Unsuccessful attempts Attempt 2          KAREN NGUYEN - Registered Nurse

## 2023-05-09 ENCOUNTER — OFFICE VISIT (OUTPATIENT)
Dept: NEUROLOGY | Facility: CLINIC | Age: 53
End: 2023-05-09
Payer: COMMERCIAL

## 2023-05-09 VITALS
BODY MASS INDEX: 25.9 KG/M2 | SYSTOLIC BLOOD PRESSURE: 134 MMHG | WEIGHT: 165 LBS | HEART RATE: 80 BPM | DIASTOLIC BLOOD PRESSURE: 76 MMHG | HEIGHT: 67 IN

## 2023-05-09 DIAGNOSIS — I69.30 SEQUELAE, POST-STROKE: Primary | ICD-10-CM

## 2023-05-09 RX ORDER — ATORVASTATIN CALCIUM 80 MG/1
80 TABLET, FILM COATED ORAL DAILY
COMMUNITY

## 2023-05-09 NOTE — PROGRESS NOTES
"Chief Complaint  Hospital Follow Up Visit    Subjective          Demetrio Mariano is a 52 y.o. male who presents to [facility]  History of Present Illness  Follow-up visit for inpatient hospitalization.  He was admitted with new onset of aphasia and dysarthria.  He also right-sided weakness and incoordination.  I did not see the patient when he was admitted to the hospital on 4/11/2023 and discharged on 4/13/2023.  He was seen by teleneurology and work-up was positive for a new left pontine stroke.  He was previously admitted the week before for a focal subacute infarct within the left basal ganglia.  He was discharged on aspirin 81 mg and Plavix 75 mg as well as atorvastatin 80 mg nightly.  CT angiogram of the head and neck was unremarkable.  He has small vessel disease noted on MRI.    The patient is not driving at this time.  His wife is with him and states that she does not want him to drive and work because his right leg is incoordinated and he cannot control the car pedals.  He is afraid that he is going to wreck.  He is also having recurrent strokes and they have an appointment to see vascular surgery for was found to have aortic disease at the bifurcation.  He also has renal artery and bilateral iliac disease.    His primary care provider put him on Brilinta and took him off aspirin and Plavix.  He is also being seen by cardiology.    Objective   Vital Signs:   /76 (BP Location: Right arm, Patient Position: Sitting, Cuff Size: Adult)   Pulse 80   Ht 170.2 cm (67.01\")   Wt 74.8 kg (165 lb)   BMI 25.84 kg/m²     Physical Exam   He is alert, dysarthric, follows commands well.  Visual fields full, EMs full directions gaze, mild right facial weakness, soft palate elevation and tongue are normal.  He has dysarthria.  There is right pronator drift.  Fine finger movements are impaired.  Tapping in the right foot is impaired significantly compared to the left " leg.  Tapping the left foot is also impaired.  Station gait he walks with a mild right hemiparetic gait.  There is decreased arm swing of his right arm.  Heart is regular rhythm normal in rate.        Assessment and Plan  Diagnoses and all orders for this visit:    1. Sequelae, post-stroke (Primary)  Assessment & Plan:  I discussed with him and his wife that he has small vessel disease and dual antiplatelet therapy as well as other antiplatelet therapy is only going to reduce his stroke risk factors by about a third.  There is already significant small vessel disease as he has recurrent strokes as documented in the MRI.  He had a right pontine stroke in January, a left subcortical stroke early in April and another left pontine stroke on his last admission.  He has significant large vessel disease noted on the lower aorta and bifurcation.  Discussed with him regarding significant amount of risk factors that is causing the vasculopathy with hypertension, diabetes, hyperlipidemia, history of smoking, previous strokes.  I discussed with him that I do not prescribe Brilinta and Brilinta indicated for minor stroke and TIA for 1 month and thereafter there is no data supporting this.  I would recommend using 75 mg as single antiplatelet therapy as I recommended in the past.  The risk of dual antiplatelet therapy after 21 days increase the risk for hemorrhage.    He is to continue physical therapy.  I would recommend for him to take a 's evaluation before he drives.  I discussed with him regarding limitations that may be given to him.    I am recommending for him to follow-up for total permanent disability secondary to his recurrent strokes and multiple comorbidity that needs to be addressed medically.    I will see him again in 2 months time for follow-up.         Total time spent with the patient and coordinating patient care was 55 minutes.    Follow Up  No follow-ups on file.  Patient was given instructions and  counseling regarding his condition or for health maintenance advice. Please see specific information pulled into the AVS if appropriate.

## 2023-05-09 NOTE — ASSESSMENT & PLAN NOTE
I discussed with him and his wife that he has small vessel disease and dual antiplatelet therapy as well as other antiplatelet therapy is only going to reduce his stroke risk factors by about a third.  There is already significant small vessel disease as he has recurrent strokes as documented in the MRI.  He had a right pontine stroke in January, a left subcortical stroke early in April and another left pontine stroke on his last admission.  He has significant large vessel disease noted on the lower aorta and bifurcation.  Discussed with him regarding significant amount of risk factors that is causing the vasculopathy with hypertension, diabetes, hyperlipidemia, history of smoking, previous strokes.  I discussed with him that I do not prescribe Brilinta and Brilinta indicated for minor stroke and TIA for 1 month and thereafter there is no data supporting this.  I would recommend using 75 mg as single antiplatelet therapy as I recommended in the past.  The risk of dual antiplatelet therapy after 21 days increase the risk for hemorrhage.    He is to continue physical therapy.  I would recommend for him to take a 's evaluation before he drives.  I discussed with him regarding limitations that may be given to him.    I am recommending for him to follow-up for total permanent disability secondary to his recurrent strokes and multiple comorbidity that needs to be addressed medically.    I will see him again in 2 months time for follow-up.

## 2023-05-10 DIAGNOSIS — I63.9 CEREBROVASCULAR ACCIDENT (CVA), UNSPECIFIED MECHANISM: Primary | ICD-10-CM

## 2023-05-11 ENCOUNTER — OFFICE VISIT (OUTPATIENT)
Dept: VASCULAR SURGERY | Facility: HOSPITAL | Age: 53
End: 2023-05-11
Payer: COMMERCIAL

## 2023-05-11 VITALS
RESPIRATION RATE: 18 BRPM | HEART RATE: 82 BPM | SYSTOLIC BLOOD PRESSURE: 90 MMHG | OXYGEN SATURATION: 99 % | DIASTOLIC BLOOD PRESSURE: 70 MMHG | TEMPERATURE: 97.9 F

## 2023-05-11 DIAGNOSIS — I74.09 AORTOILIAC OCCLUSIVE DISEASE: Primary | ICD-10-CM

## 2023-05-11 PROCEDURE — G0463 HOSPITAL OUTPT CLINIC VISIT: HCPCS | Performed by: SURGERY

## 2023-05-11 NOTE — PROGRESS NOTES
[facility]   HISTORY AND PHYSICAL    Patient Name: Demetrio Mariano  : 1970  MRN: [id number]  Primary Care Physician:  Kia Mayorga APRN      Subjective   Subjective     Chief Complaint: History of basal ganglia and lacunar infarcts    HPI:    Demetrio Mariano is a 52 y.o. male with history of recent lacunar and basal ganglia strokes but without any significant carotid stenosis thought to be secondary to small vessel disease who also has nonischemic cardiomyopathy with an ICD and type 1 diabetes mellitus with CHF.  Patient underwent a recent CT of the abdomen pelvis due to elevated cancer markers as a screening test and was incidentally found to have extensive atherosclerosis throughout the abdominal aorta with high-grade stenosis just above the bifurcation.  There is a reasonable likelihood that this is a chronic plaque based on limited images I can see on a CT scan from .  As per Dr. Stoner he has been resumed back on his plavix and aspirin.  He is also on a high dose statin. He is no longer on brillinta per neurology recommendations.  He does have palpable bilateral femoral pulses and easily dopplerable bilateral pedal signals.  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   He states he has hyperesthesia to his feet regarding his neuropathy and that with any foot wounds in the past they have healed but have taken 2-3 months.     Review of Systems    Non contributory except for the History of Present Illness    Personal History     Past Medical History:   Diagnosis Date   • CHF (congestive heart failure)    • Coronary artery disease involving native heart without angina pectoris      Nonobstructive coronary artery disease involving diagonal branch per cardiac catheterization done on 2021   • Essential hypertension, benign    • Hypercholesterolemia    • ICD (implantable cardioverter-defibrillator), dual, in situ 2021   • Nicotine dependence    •  Non-ischemic cardiomyopathy (CMS/HCC) 05/03/2021    Mildly dilated left ventricular cavity with severe global hypokinesis with estimated LV ejection fraction of       28%. On echo May 3, 2021   • Peyronie's disease    • Seizures     started 2021   • Stroke    • Type 1 diabetes mellitus with vascular disease    • Type I diabetes mellitus, uncontrolled        Past Surgical History:   Procedure Laterality Date   • CARDIAC CATHETERIZATION     • CARDIAC ELECTROPHYSIOLOGY PROCEDURE N/A 06/15/2021    Procedure: ICD SC new;  Surgeon: Regulo Coyne MD;  Location: [facility] CATH INVASIVE LOCATION;  Service: Cardiovascular;  Laterality: N/A;   • FRACTURE SURGERY Right     crushed heel injury with 14 fractures   • OTHER SURGICAL HISTORY  06/10/2013    plication for treatment of pyronie's disease   • VASECTOMY         Family History: family history includes Diabetes in his father and mother. Otherwise pertinent FHx was reviewed and not pertinent to current issue.    Social History:  reports that he quit smoking about 5 weeks ago. His smoking use included cigarettes. He started smoking about 37 years ago. He has a 37.00 pack-year smoking history. He has been exposed to tobacco smoke. He has never used smokeless tobacco. He reports that he does not drink alcohol and does not use drugs.    Home Medications:  Current Outpatient Medications on File Prior to Visit   Medication Sig   • atorvastatin (LIPITOR) 80 MG tablet Take 1 tablet by mouth Daily.   • carvedilol (COREG) 25 MG tablet Take 1 tablet by mouth 2 (Two) Times a Day.   • Continuous Blood Gluc Sensor (FreeStyle Francis 2 Sensor) misc 1 each Every 14 (Fourteen) Days.   • furosemide (LASIX) 20 MG tablet Take 1 tablet by mouth Daily.   • Glucagon (Baqsimi Two Pack) 3 MG/DOSE powder 3 mg into the nostril(s) as directed by provider As Needed (Severe hypoglycemia).   • Insulin Aspart (NovoLOG) 100 UNIT/ML injection Up to 100 per day per insulin pump   • insulin patient supplied pump  Inject  under the skin into the appropriate area as directed Continuous. Type of Insulin: NOVOLOG  Basal Dose:   12 AM to 6 AM - 2.15u/hr  6 AM to 3 PM - 1.9u/hr  3 PM to 12 AM - 2.115u/hr    Bolus Dose: CORRECTION RATIO 1:40  Prescriber:KATHERINE ROWLAND  Phone number:  Next refill: 4/14/23  MEDTRONIC PUMP   • nicotine (NICODERM CQ) 14 MG/24HR patch Place 1 patch on the skin as directed by provider Daily.   • sacubitril-valsartan (ENTRESTO)  MG tablet Take 1 tablet by mouth 2 (Two) Times a Day.   • spironolactone (ALDACTONE) 25 MG tablet Take 1 tablet by mouth Daily.   • ticagrelor (BRILINTA) 90 MG tablet tablet Take 1 tablet by mouth.     No current facility-administered medications on file prior to visit.          Allergies:  No Known Allergies    Objective   Objective     Vitals:        Physical Exam    Constitutional: Awake, alert, NAD   Neck: Supple   Respiratory: Clear to auscultation bilaterally, nonlabored respirations    Cardiovascular: RRR, no murmurs   Abdomen: S/NT/ND, + BS   Extremities: symmetric with bilateral femoral pulses preserved and palpable and bilateral pedal signals dopplerable.  No wounds or lesions.    Result Review    Most notable findings include: CT angiogram of the neck on 4/5/2023 without any large vessel occlusion or high-grade stenosis to the carotids.    CT angiogram of the abdominal aorta with runoff on 5/4/2023 showing extensive atherosclerosis throughout the abdominal aorta with suspected high-grade stenosis just proximal to the bifurcation.  No evidence of aneurysm or dissection.  Unfortunate this was performed at an outlying facility.    Assessment & Plan   Assessment / Plan       There are no diagnoses linked to this encounter.     Assessment & Plan:   Demetrio Mariano is a 52 y.o. male with pontine, lacunar and basal ganglia strokes x3 since January of this year thought to be secondary to small vessel disease.  Has continued right-sided weakness.  2.  Patient found to  have a likely chronic aortoiliac occlusive disease with high-grade stenosis in the distal aorta however he does have preserved femoral pulses and pedal signals and is essentially asymptomatic from this.  3.  He is a type I diabetic diagnosed approximate 20 years ago with an insulin pump and also has significant CHF and cardiomyopathy with an ICD placed back in 2020.  His most recent ejection fraction was 43% back in April 2023.  4.  Fortunately the patient has no evidence of significant carotid stenosis or occlusion.  On the left he does have posterior calcified plaquing but this is much less than 50% with no indication for intervention at this time.  5.  With regards to his aortoiliac occlusive disease we will obtain aorto-iliac duplex study along with bilateral lower extremity multisegmental studies for baseline velocities and lower extremity flow within the next 1-2 months..  He understands that he is to protect his feet and extremities by keeping them covered at all times to avoid wounds, infection and potential limb loss.  6.  Also discussed with the patient and his mother as well as his wife over the phone that should he become symptomatic from his aortoiliac occlusive disease he may very well require an aortobifem bypass in the future.  This may be better performed at tertiary center such as [facility] given his significant cardiac history, recent small vessel pontine strokes and question of hypercoagulable state.  However as he is currently asymptomatic this is not indicated at this time.  7.  I do recommend that the patient be continued on his Plavix, aspirin, and statin therapy for maximal medical therapy with regards to his strokes.  I also do think it best that he continue with this upcoming hematology/oncology consultation to further evaluate for the etiology of his strokes.      Electronically signed by Ronen Shah MD, 05/11/23, 8:20 AM EDT.

## 2023-05-19 ENCOUNTER — CONSULT (OUTPATIENT)
Dept: ONCOLOGY | Facility: HOSPITAL | Age: 53
End: 2023-05-19
Payer: COMMERCIAL

## 2023-05-19 ENCOUNTER — LAB (OUTPATIENT)
Dept: ONCOLOGY | Facility: HOSPITAL | Age: 53
End: 2023-05-19
Payer: COMMERCIAL

## 2023-05-19 VITALS
SYSTOLIC BLOOD PRESSURE: 104 MMHG | BODY MASS INDEX: 25.99 KG/M2 | OXYGEN SATURATION: 100 % | HEART RATE: 84 BPM | TEMPERATURE: 97.8 F | RESPIRATION RATE: 18 BRPM | WEIGHT: 165.57 LBS | HEIGHT: 67 IN | DIASTOLIC BLOOD PRESSURE: 62 MMHG

## 2023-05-19 DIAGNOSIS — I63.9 CEREBROVASCULAR ACCIDENT (CVA), UNSPECIFIED MECHANISM: Primary | ICD-10-CM

## 2023-05-19 DIAGNOSIS — I63.9 CEREBROVASCULAR ACCIDENT (CVA), UNSPECIFIED MECHANISM: ICD-10-CM

## 2023-05-19 DIAGNOSIS — D68.59 THROMBOPHILIA: ICD-10-CM

## 2023-05-19 PROBLEM — R47.81 SLURRED SPEECH: Status: ACTIVE | Noted: 2023-01-09

## 2023-05-19 LAB
ALBUMIN SERPL-MCNC: 4.8 G/DL (ref 3.5–5.2)
ALBUMIN/GLOB SERPL: 1.8 G/DL
ALP SERPL-CCNC: 117 U/L (ref 39–117)
ALT SERPL W P-5'-P-CCNC: 40 U/L (ref 1–41)
ANION GAP SERPL CALCULATED.3IONS-SCNC: 10.9 MMOL/L (ref 5–15)
AST SERPL-CCNC: 20 U/L (ref 1–40)
AT III PPP CHRO-ACNC: 136 % (ref 94–138)
BASOPHILS # BLD AUTO: 0.03 10*3/MM3 (ref 0–0.2)
BASOPHILS NFR BLD AUTO: 0.4 % (ref 0–1.5)
BILIRUB SERPL-MCNC: 0.5 MG/DL (ref 0–1.2)
BUN SERPL-MCNC: 20 MG/DL (ref 6–20)
BUN/CREAT SERPL: 23.8 (ref 7–25)
CALCIUM SPEC-SCNC: 9.5 MG/DL (ref 8.6–10.5)
CHLORIDE SERPL-SCNC: 100 MMOL/L (ref 98–107)
CO2 SERPL-SCNC: 24.1 MMOL/L (ref 22–29)
CREAT SERPL-MCNC: 0.84 MG/DL (ref 0.76–1.27)
DEPRECATED RDW RBC AUTO: 43.6 FL (ref 37–54)
EGFRCR SERPLBLD CKD-EPI 2021: 105.6 ML/MIN/1.73
EOSINOPHIL # BLD AUTO: 0.23 10*3/MM3 (ref 0–0.4)
EOSINOPHIL NFR BLD AUTO: 2.8 % (ref 0.3–6.2)
ERYTHROCYTE [DISTWIDTH] IN BLOOD BY AUTOMATED COUNT: 12.7 % (ref 12.3–15.4)
GLOBULIN UR ELPH-MCNC: 2.6 GM/DL
GLUCOSE SERPL-MCNC: 194 MG/DL (ref 65–99)
HCT VFR BLD AUTO: 38.8 % (ref 37.5–51)
HGB BLD-MCNC: 13.3 G/DL (ref 13–17.7)
IMM GRANULOCYTES # BLD AUTO: 0.01 10*3/MM3 (ref 0–0.05)
IMM GRANULOCYTES NFR BLD AUTO: 0.1 % (ref 0–0.5)
LYMPHOCYTES # BLD AUTO: 3.01 10*3/MM3 (ref 0.7–3.1)
LYMPHOCYTES NFR BLD AUTO: 37 % (ref 19.6–45.3)
MCH RBC QN AUTO: 32 PG (ref 26.6–33)
MCHC RBC AUTO-ENTMCNC: 34.3 G/DL (ref 31.5–35.7)
MCV RBC AUTO: 93.3 FL (ref 79–97)
MONOCYTES # BLD AUTO: 0.69 10*3/MM3 (ref 0.1–0.9)
MONOCYTES NFR BLD AUTO: 8.5 % (ref 5–12)
NEUTROPHILS NFR BLD AUTO: 4.17 10*3/MM3 (ref 1.7–7)
NEUTROPHILS NFR BLD AUTO: 51.2 % (ref 42.7–76)
PLATELET # BLD AUTO: 324 10*3/MM3 (ref 140–450)
PMV BLD AUTO: 10 FL (ref 6–12)
POTASSIUM SERPL-SCNC: 5.4 MMOL/L (ref 3.5–5.2)
PROT SERPL-MCNC: 7.4 G/DL (ref 6–8.5)
RBC # BLD AUTO: 4.16 10*6/MM3 (ref 4.14–5.8)
SODIUM SERPL-SCNC: 135 MMOL/L (ref 136–145)
WBC NRBC COR # BLD: 8.14 10*3/MM3 (ref 3.4–10.8)

## 2023-05-19 PROCEDURE — 85303 CLOT INHIBIT PROT C ACTIVITY: CPT

## 2023-05-19 PROCEDURE — 86147 CARDIOLIPIN ANTIBODY EA IG: CPT

## 2023-05-19 PROCEDURE — 85670 THROMBIN TIME PLASMA: CPT

## 2023-05-19 PROCEDURE — 85025 COMPLETE CBC W/AUTO DIFF WBC: CPT

## 2023-05-19 PROCEDURE — 85705 THROMBOPLASTIN INHIBITION: CPT

## 2023-05-19 PROCEDURE — 36415 COLL VENOUS BLD VENIPUNCTURE: CPT

## 2023-05-19 PROCEDURE — 85306 CLOT INHIBIT PROT S FREE: CPT

## 2023-05-19 PROCEDURE — G0463 HOSPITAL OUTPT CLINIC VISIT: HCPCS | Performed by: INTERNAL MEDICINE

## 2023-05-19 PROCEDURE — 86146 BETA-2 GLYCOPROTEIN ANTIBODY: CPT

## 2023-05-19 PROCEDURE — 85300 ANTITHROMBIN III ACTIVITY: CPT

## 2023-05-19 PROCEDURE — 85613 RUSSELL VIPER VENOM DILUTED: CPT

## 2023-05-19 PROCEDURE — 85305 CLOT INHIBIT PROT S TOTAL: CPT

## 2023-05-19 PROCEDURE — 80053 COMPREHEN METABOLIC PANEL: CPT

## 2023-05-19 PROCEDURE — 85732 THROMBOPLASTIN TIME PARTIAL: CPT

## 2023-05-19 RX ORDER — CLOPIDOGREL BISULFATE 75 MG/1
1 TABLET ORAL DAILY
COMMUNITY
Start: 2023-05-04

## 2023-05-19 NOTE — PROGRESS NOTES
"Chief Complaint/Care Team   HEMATOLOGY    Danielle Dawson APRN Bratcher, Jenna, APRN    History of Present Illness     Diagnosis: Evaluation for thrombophilia, h/o CVA of unclear etiology    Current Treatment: Work up in progress, currently taking ASA and Plavix  Previous Treatment: None    Demetrio Mariano is a 52 y.o. male who presents to [facility] HEMATOLOGY [facility] ONCOLOGY for evaluation for thrombophilia given history of strokes.  He is here with his wife.  Patient's past medical history significant for type 1 diabetes with use of insulin pump, CHF with ICD and loop recorder, tobacco dependence, and alcohol dependence (reports he has been sober since 2023 from both cigarettes and alcohol).  Patient denies any previous blood clots prior to 3 strokes earlier this year .  Patient denies any long car/airplane trips, steroid or hormone use, or significant mobility.  Patient reports having 2 TIAs within the last 6 months, reports a TIA on 2023, second on 2023.  Reports having been diagnosed with a stroke on 2023, when he presented with aphasia noticed by wife when she spoke with him on the phone on his job.  He denies receiving any tPA or other intervention and stated he was out of the window for these interventions when he presented to the hospital.  Reports he quit smoking prior to experiencing stroke.  Patient is currently receiving rehab after stroke.     Family history: Patient denies any family history of clotting disorders.  Patient's father and paternal grandfather both  from myocardial infarction's.      Review of Systems     Oncology/Hematology History    No history exists.       Objective     Vitals:    23 1100   BP: 104/62   Pulse: 84   Resp: 18   Temp: 97.8 °F (36.6 °C)   TempSrc: Temporal   SpO2: 100%   Weight: 75.1 kg (165 lb 9.1 oz)   Height: 170.2 cm (67.01\")   PainSc: Comment: NO VALUE     ECOG score: 0         PHQ-9 Total Score:     "     Physical Exam  Constitutional:       Appearance: Normal appearance. He is normal weight.   Eyes:      Extraocular Movements: Extraocular movements intact.      Conjunctiva/sclera: Conjunctivae normal.   Cardiovascular:      Pulses: Normal pulses.      Heart sounds: Normal heart sounds.   Pulmonary:      Effort: Pulmonary effort is normal.      Breath sounds: Normal breath sounds.   Musculoskeletal:      Cervical back: Normal range of motion and neck supple.   Skin:     General: Skin is warm and dry.           Past Medical History     Past Medical History:   Diagnosis Date   • CHF (congestive heart failure)    • Coronary artery disease involving native heart without angina pectoris      Nonobstructive coronary artery disease involving diagonal branch per cardiac catheterization done on 01/21/2021   • Essential hypertension, benign    • Hypercholesterolemia    • ICD (implantable cardioverter-defibrillator), dual, in situ 09/22/2021   • Nicotine dependence    • Non-ischemic cardiomyopathy (CMS/HCC) 05/03/2021    Mildly dilated left ventricular cavity with severe global hypokinesis with estimated LV ejection fraction of       28%. On echo May 3, 2021   • Peyronie's disease    • Seizures     started 2021   • Stroke    • Type 1 diabetes mellitus with vascular disease    • Type I diabetes mellitus, uncontrolled      Current Outpatient Medications on File Prior to Visit   Medication Sig Dispense Refill   • atorvastatin (LIPITOR) 80 MG tablet Take 1 tablet by mouth Daily.     • carvedilol (COREG) 25 MG tablet Take 1 tablet by mouth 2 (Two) Times a Day. 180 tablet 2   • clopidogrel (PLAVIX) 75 MG tablet Take 1 tablet by mouth Daily.     • Continuous Blood Gluc Sensor (FreeStyle Francis 2 Sensor) misc 1 each Every 14 (Fourteen) Days. 2 each 11   • furosemide (LASIX) 20 MG tablet Take 1 tablet by mouth Daily.     • Glucagon (Baqsimi Two Pack) 3 MG/DOSE powder 3 mg into the nostril(s) as directed by provider As Needed (Severe  hypoglycemia). 1 each 2   • Insulin Aspart (NovoLOG) 100 UNIT/ML injection Up to 100 per day per insulin pump 30 mL 3   • insulin patient supplied pump Inject  under the skin into the appropriate area as directed Continuous. Type of Insulin: NOVOLOG  Basal Dose:   12 AM to 6 AM - 2.15u/hr  6 AM to 3 PM - 1.9u/hr  3 PM to 12 AM - 2.115u/hr    Bolus Dose: CORRECTION RATIO 1:40  Prescriber:KATHERINE ROWLAND  Phone number:  Next refill: 23  MEDTRONIC PUMP     • nicotine (NICODERM CQ) 14 MG/24HR patch Place 1 patch on the skin as directed by provider Daily. 21 each 0   • sacubitril-valsartan (ENTRESTO)  MG tablet Take 1 tablet by mouth 2 (Two) Times a Day. 180 tablet 3   • spironolactone (ALDACTONE) 25 MG tablet Take 1 tablet by mouth Daily. 90 tablet 3   • ticagrelor (BRILINTA) 90 MG tablet tablet Take 1 tablet by mouth.       No current facility-administered medications on file prior to visit.      No Known Allergies  Past Surgical History:   Procedure Laterality Date   • CARDIAC CATHETERIZATION     • CARDIAC ELECTROPHYSIOLOGY PROCEDURE N/A 06/15/2021    Procedure: ICD SC new;  Surgeon: Regulo Coyne MD;  Location: [facility] INVASIVE LOCATION;  Service: Cardiovascular;  Laterality: N/A;   • FRACTURE SURGERY Right     crushed heel injury with 14 fractures   • OTHER SURGICAL HISTORY  06/10/2013    plication for treatment of pyronie's disease   • VASECTOMY       Social History     Socioeconomic History   • Marital status:    • Number of children: 2   Tobacco Use   • Smoking status: Former     Packs/day: 1.00     Years: 37.00     Pack years: 37.00     Types: Cigarettes     Start date: 3/11/1986     Quit date: 2023     Years since quittin.1     Passive exposure: Past   • Smokeless tobacco: Never   Vaping Use   • Vaping Use: Never used   Substance and Sexual Activity   • Alcohol use: Never     Alcohol/week: 1.0 standard drink     Types: 1 Cans of beer per week   • Drug use: Never   • Sexual activity:  Defer     Family History   Problem Relation Age of Onset   • Diabetes Mother    • Diabetes Father        Results     Result Review   The following data was reviewed by: Omar Rosario MD on 05/19/2023:  Lab Results   Component Value Date    HGB 13.3 05/19/2023    HCT 38.8 05/19/2023    MCV 93.3 05/19/2023     05/19/2023    WBC 8.14 05/19/2023    NEUTROABS 4.17 05/19/2023    LYMPHSABS 3.01 05/19/2023    MONOSABS 0.69 05/19/2023    EOSABS 0.23 05/19/2023    BASOSABS 0.03 05/19/2023     Lab Results   Component Value Date    GLUCOSE 194 (H) 05/19/2023    BUN 20 05/19/2023    CREATININE 0.84 05/19/2023     (L) 05/19/2023    K 5.4 (H) 05/19/2023     05/19/2023    CO2 24.1 05/19/2023    CALCIUM 9.5 05/19/2023    PROTEINTOT 7.4 05/19/2023    ALBUMIN 4.8 05/19/2023    BILITOT 0.5 05/19/2023    ALKPHOS 117 05/19/2023    AST 20 05/19/2023    ALT 40 05/19/2023     Lab Results   Component Value Date    MG 2.0 04/13/2023    PHOS 3.7 04/06/2023           No radiology results for the last day       Assessment & Plan     Diagnoses and all orders for this visit:    1. Cerebrovascular accident (CVA), unspecified mechanism (Primary)  -     CBC & Differential; Future  -     Comprehensive Metabolic Panel; Future  -     Anticardiolipin Antibody, IgG / M, Qn; Future  -     Beta-2 Glycoprotein Antibodies; Future  -     Lupus Anticoagulant; Future  -     Factor 5 Leiden; Future  -     Antithrombin III; Future  -     Protein C-Functional; Future  -     Protein S, Total & Free; Future  -     Factor II, DNA Analysis; Future    2. Thrombophilia        Demetrio Mariano is a 52 y.o. male who presents to [facility] HEMATOLOGY & ONCOLOGY for  evaluation for thrombophilia given history of strokes.  He is here with his wife.  Patient's past medical history significant for type 1 diabetes with use of insulin pump, CHF with ICD and loop recorder, tobacco dependence, and alcohol dependence (reports he has been sober  from cigarettes and alcohol since 4/5/2023).  Patient denies any prior history of PE or DVT or other thrombus in the past.  Also, no family history of clotting disorders.  Family history of father and paternal grandfather both dying of heart attacks.  Patient reports he is undergone cardiac evaluation to assess for cause of his TIAs and CVA earlier this year.  Since there was an unknown etiology he is sent to hematology for further evaluation for thrombophilia.    Discussed with patient and wife plan to obtain lab work to assess for the most common thrombophilias, which include testing for factor V Leiden, prothrombin mutation, protein C&S deficiencies, Antithrombin levels, and testing for antiphospholipid antibody syndrome.    For now, recommend he continue aspirin and Plavix per neurology recommendations.  Plan to have patient follow-up in 3 to 4 weeks to discuss results of these lab work.    Patient and wife verbalized understanding and agreement with plan.    Please note that portions of this note were completed with a voice recognition program.    Electronically signed by Omar Rosario MD, 05/20/23, 5:51 PM EDT.    ADDENDUM from 5/22/23: Pt found to have heterozygous Factor V Leiden mutation. Will need to discuss once all the other labs have returned (some are still pending), at next clinic appointment.    Follow Up     I spent 30 minutes caring for Demetrio on this date of service. This time includes time spent by me in the following activities:preparing for the visit, reviewing tests, obtaining and/or reviewing a separately obtained history, performing a medically appropriate examination and/or evaluation , counseling and educating the patient/family/caregiver, ordering medications, tests, or procedures, referring and communicating with other health care professionals , documenting information in the medical record and care coordination.    This is an acute or chronic illness that poses a threat to life or  bodily function. The above treatment plan involves a high risk of complications and/or mortality of patient management.    The patient was seen and examined. Work by the provider also included review and/or ordering of lab tests, review and/or ordering of radiology tests, review and/or ordering of medicine tests, discussion with other physicians or providers, independent review of data, obtaining old records, review/summation of old records, and/or other review.    I have reviewed the family history, social history, and past medical history for this patient. Previous information and data has been reviewed and updated as needed. I have reviewed and verified the chief complaint, history, and other documentation. The patient was interviewed and examined in the clinic and the chart reviewed. The previous observations, recommendations, and conclusions were reviewed including those of other providers.     The plan was discussed with the patient and/or family. The patient was given time to ask questions and these questions were answered. At the conclusion of their visit they had no additional questions or concerns and all questions were answered to their satisfaction.    Patient was given instructions and counseling regarding his condition or for health maintenance advice. Please see specific information pulled into the AVS if appropriate.

## 2023-05-20 LAB
CARDIOLIPIN IGG SER IA-ACNC: <9 GPL U/ML (ref 0–14)
CARDIOLIPIN IGM SER IA-ACNC: <9 MPL U/ML (ref 0–12)

## 2023-05-21 LAB
B2 GLYCOPROT1 IGA SER-ACNC: <9 GPI IGA UNITS (ref 0–25)
B2 GLYCOPROT1 IGG SER-ACNC: 9 GPI IGG UNITS (ref 0–20)
B2 GLYCOPROT1 IGM SER-ACNC: <9 GPI IGM UNITS (ref 0–32)
PROT C ACT/NOR PPP: 146 % (ref 73–180)
PROT S AG ACT/NOR PPP IA: 135 % (ref 60–150)
PROT S FREE AG ACT/NOR PPP IA: 115 % (ref 61–136)

## 2023-05-22 LAB
APTT SCREEN TO CONFIRM RATIO: 1.03 RATIO (ref 0–1.34)
CONFIRM APTT/NORMAL: 32.8 SEC (ref 0–47.6)
F5 GENE MUT ANL BLD/T: ABNORMAL
FACTOR II, DNA ANALYSIS: NORMAL
LA 2 SCREEN W REFLEX-IMP: NORMAL
SCREEN APTT: 37.6 SEC (ref 0–43.5)
SCREEN DRVVT: 33.5 SEC (ref 0–47)
THROMBIN TIME: 14.9 SEC (ref 0–23)

## 2023-05-30 ENCOUNTER — TELEPHONE (OUTPATIENT)
Dept: CARDIOLOGY | Facility: CLINIC | Age: 53
End: 2023-05-30

## 2023-05-30 DIAGNOSIS — I42.8 NON-ISCHEMIC CARDIOMYOPATHY: Chronic | ICD-10-CM

## 2023-05-30 RX ORDER — SPIRONOLACTONE 25 MG/1
25 TABLET ORAL DAILY
Qty: 90 TABLET | Refills: 3 | Status: SHIPPED | OUTPATIENT
Start: 2023-05-30

## 2023-05-30 NOTE — TELEPHONE ENCOUNTER
Caller: NILO ELDRIDGE    Relationship: Emergency Contact    Best call back number: [phone]    What is the best time to reach you: ANYTIME    Who are you requesting to speak with (clinical staff, provider,  specific staff member): LUIGI    Do you know the name of the person who called: NILO ELDRIDGE    What was the call regarding: PATIENT'S WIFE WOULD LIKE A CALL BACK IN REGARDS TO SOME QUESTIONS SHE HAS ABOUT HIS MEDICATION.

## 2023-05-30 NOTE — TELEPHONE ENCOUNTER
AMELIA PT WIFE.  SHE STATED KATHY HAS NO INS AT THE MOMENT.  HE IS WAITING ON [employer] INS TO KICK IN.  STATED SHE MAKES TOO MUCH MONEY FOR ASSISTANCE.  SHE REQUESTED SAMPLES OF ENTRESTO AS THEY CAN NOT AFFORD IT WITHOUT INS.  SHE ALSO REQUESTED SPIRONOLACTONE REFILL.  TOLD HER I WOULD LEAVE 28 DAY SUPPLY OF ENTRESTO SAMPLES AT THE  FOR THEM TO .  ADVISED NILO PT NEEDS TO TAKE 2 TABS TWICE A DAY AS WE DON'T HAVE THE 97/103 MG IN SAMPLES.  WE ONLY HAVE 49/51 MG IN SAMPLES.  OK PER HOLLY ALMONTE, APRN.

## 2023-06-07 ENCOUNTER — TELEPHONE (OUTPATIENT)
Dept: ONCOLOGY | Facility: HOSPITAL | Age: 53
End: 2023-06-07

## 2023-06-07 NOTE — TELEPHONE ENCOUNTER
Caller: NILO ELDRIDGE    Relationship: Emergency Contact    Best call back number: [phone]      What was the call regarding: SPOUSE CALLED STATED SOMEONE CALLED PATIENT AND MAY HAVE TOLD HIM THAT THE TELEPHONE VISIT WAS NOT GOING TO BE COVERED BY INSURANCE. PATIENT HAS HAD SEVERAL STROKES AND HAS A HARD TIME REMEMBERING THINGS, SPOUSE STATED WE ARE SUPPOSED TO BE CALLING HER      NO MESSAGES IN CHART STATING WHO CALLED PATIENT OR THAT ANYTHING WAS CHANGING WITH APPOINTMENT     PLEASE CALL SPOUSE TO ADVISE

## 2023-06-09 ENCOUNTER — TELEMEDICINE (OUTPATIENT)
Dept: ONCOLOGY | Facility: HOSPITAL | Age: 53
End: 2023-06-09
Payer: COMMERCIAL

## 2023-06-09 DIAGNOSIS — I63.9 CEREBROVASCULAR ACCIDENT (CVA), UNSPECIFIED MECHANISM: Primary | ICD-10-CM

## 2023-06-09 DIAGNOSIS — D68.51 HETEROZYGOUS FACTOR V LEIDEN MUTATION: ICD-10-CM

## 2023-06-09 NOTE — PROGRESS NOTES
Chief Complaint/Care Team   No chief complaint on file.    Kia Mayorga APRN Bratcher, Jenna, APRN    TELEHEALTH DISCLAIMER  You have chosen to receive care through a telephone visit. Do you consent to use a telephone visit for your medical care today?  YES  This visit has been scheduled or rescheduled as a phone visit to comply with patient safety & health concerns in accordance with CDC recommendations.    Total time of discussion was 7 minutes.     History of Present Illness     Diagnosis: Evaluation for thrombophilia, h/o CVA of unclear etiology    Current Treatment: Work up in progress, currently taking ASA and Plavix    Previous Treatment: None    Demetrio Mariano is a 52 y.o. male who presents to [facility]EMATOLOG[facility] ONCOL[facility] for evaluation for thrombophilia given history of strokes.  He is here with his wife.  Patient's past medical history significant for type 1 diabetes with use of insulin pump, CHF with ICD and loop recorder, tobacco dependence, and alcohol dependence (reports he has been sober since 2023 from both cigarettes and alcohol).  Patient denies any previous blood clots prior to 3 strokes earlier this year .  Patient denies any long car/airplane trips, steroid or hormone use, or significant mobility.  Patient reports having 2 TIAs within the last 6 months, reports a TIA on 2023, second on 2023.  Reports having been diagnosed with a stroke on 2023, when he presented with aphasia noticed by wife when she spoke with him on the phone on his job.  He denies receiving any tPA or other intervention and stated he was out of the window for these interventions when he presented to the hospital.  Reports he quit smoking prior to experiencing stroke.  Patient is currently receiving rehab after stroke.     Family history: Patient denies any family history of clotting disorders.  Patient's father and paternal grandfather both  from myocardial  infarction's.    Interval History: Pt and wife present for his clinic appointment, pt reports tolerating ASA and plavix well, has not missed any doses, pt denies any blood loss or melena. Pt continues to follow up with cardiologist, neurologist and vascular surgeon.    Review of Systems     Oncology/Hematology History    No history exists.       Objective     There were no vitals filed for this visit.            This was a video patient encounter thus no vitals or physical exam was performed.       Past Medical History     Past Medical History:   Diagnosis Date   • CHF (congestive heart failure)    • Coronary artery disease involving native heart without angina pectoris      Nonobstructive coronary artery disease involving diagonal branch per cardiac catheterization done on 01/21/2021   • Essential hypertension, benign    • Hypercholesterolemia    • ICD (implantable cardioverter-defibrillator), dual, in situ 09/22/2021   • Nicotine dependence    • Non-ischemic cardiomyopathy (CMS/HCC) 05/03/2021    Mildly dilated left ventricular cavity with severe global hypokinesis with estimated LV ejection fraction of       28%. On echo May 3, 2021   • Peyronie's disease    • Seizures     started 2021   • Stroke    • Type 1 diabetes mellitus with vascular disease    • Type I diabetes mellitus, uncontrolled      Current Outpatient Medications on File Prior to Visit   Medication Sig Dispense Refill   • atorvastatin (LIPITOR) 80 MG tablet Take 1 tablet by mouth Daily.     • carvedilol (COREG) 25 MG tablet Take 1 tablet by mouth 2 (Two) Times a Day. 180 tablet 2   • clopidogrel (PLAVIX) 75 MG tablet Take 1 tablet by mouth Daily.     • Continuous Blood Gluc Sensor (FreeStyle Francis 2 Sensor) misc 1 each Every 14 (Fourteen) Days. 2 each 11   • furosemide (LASIX) 20 MG tablet Take 1 tablet by mouth Daily.     • Glucagon (Baqsimi Two Pack) 3 MG/DOSE powder 3 mg into the nostril(s) as directed by provider As Needed (Severe hypoglycemia).  1 each 2   • Insulin Aspart (NovoLOG) 100 UNIT/ML injection Up to 100 per day per insulin pump 30 mL 3   • insulin patient supplied pump Inject  under the skin into the appropriate area as directed Continuous. Type of Insulin: NOVOLOG  Basal Dose:   12 AM to 6 AM - 2.15u/hr  6 AM to 3 PM - 1.9u/hr  3 PM to 12 AM - 2.115u/hr    Bolus Dose: CORRECTION RATIO 1:40  Prescriber:KATHERINE ROWLAND  Phone number:  Next refill: 23  MEDTRONIC PUMP     • nicotine (NICODERM CQ) 14 MG/24HR patch Place 1 patch on the skin as directed by provider Daily. 21 each 0   • sacubitril-valsartan (ENTRESTO)  MG tablet Take 1 tablet by mouth 2 (Two) Times a Day. 180 tablet 3   • spironolactone (ALDACTONE) 25 MG tablet Take 1 tablet by mouth Daily. 90 tablet 3   • ticagrelor (BRILINTA) 90 MG tablet tablet Take 1 tablet by mouth.       No current facility-administered medications on file prior to visit.      No Known Allergies  Past Surgical History:   Procedure Laterality Date   • CARDIAC CATHETERIZATION     • CARDIAC ELECTROPHYSIOLOGY PROCEDURE N/A 06/15/2021    Procedure: ICD SC new;  Surgeon: Regulo Coyne MD;  Location: [facility] INVASIVE LOC[facility];  Service: Cardiovascular;  Laterality: N/A;   • FRACTURE SURGERY Right     crushed heel injury with 14 fractures   • OTHER SURGICAL HISTORY  06/10/2013    plication for treatment of pyronie's disease   • VASECTOMY       Social History     Socioeconomic History   • Marital status:    • Number of children: 2   Tobacco Use   • Smoking status: Former     Packs/day: 1.00     Years: 37.00     Pack years: 37.00     Types: Cigarettes     Start date: 3/11/1986     Quit date: 2023     Years since quittin.1     Passive exposure: Past   • Smokeless tobacco: Never   Vaping Use   • Vaping Use: Never used   Substance and Sexual Activity   • Alcohol use: Never     Alcohol/week: 1.0 standard drink     Types: 1 Cans of beer per week   • Drug use: Never   • Sexual activity: Defer      Family History   Problem Relation Age of Onset   • Diabetes Mother    • Diabetes Father        Results     Result Review   The following data was reviewed by: Omar Rosario MD on 05/19/2023:  Lab Results   Component Value Date    HGB 13.3 05/19/2023    HCT 38.8 05/19/2023    MCV 93.3 05/19/2023     05/19/2023    WBC 8.14 05/19/2023    NEUTROABS 4.17 05/19/2023    LYMPHSABS 3.01 05/19/2023    MONOSABS 0.69 05/19/2023    EOSABS 0.23 05/19/2023    BASOSABS 0.03 05/19/2023     Lab Results   Component Value Date    GLUCOSE 194 (H) 05/19/2023    BUN 20 05/19/2023    CREATININE 0.84 05/19/2023     (L) 05/19/2023    K 5.4 (H) 05/19/2023     05/19/2023    CO2 24.1 05/19/2023    CALCIUM 9.5 05/19/2023    PROTEINTOT 7.4 05/19/2023    ALBUMIN 4.8 05/19/2023    BILITOT 0.5 05/19/2023    ALKPHOS 117 05/19/2023    AST 20 05/19/2023    ALT 40 05/19/2023     Lab Results   Component Value Date    MG 2.0 04/13/2023    PHOS 3.7 04/06/2023           No radiology results for the last day       Assessment & Plan     Diagnoses and all orders for this visit:    1. Cerebrovascular accident (CVA), unspecified mechanism (Primary)    2. Heterozygous factor V Leiden mutation        Demetrio Mariano is a 52 y.o. male who presents to [facility]AT[facility] for  evaluation for thrombophilia given history of strokes.  He is here with his wife.  Patient's past medical history significant for type 1 diabetes with use of insulin pump, CHF with ICD and loop recorder, tobacco dependence, and alcohol dependence (reports he has been sober from cigarettes and alcohol since 4/5/2023).  Patient denies any prior history of PE or DVT or other thrombus in the past.  Also, no family history of clotting disorders.  Family history of father and paternal grandfather both dying of heart attacks.  Patient reports he is undergone cardiac evaluation to assess for cause of his TIAs and CVA earlier this year.  Since  there was an unknown etiology he is sent to hematology for further evaluation for thrombophilia.    Pt here with wife to discuss results of lab work obtained after last clinic appointment to assess for thrombophilia which included: for factor V Leiden, prothrombin mutation, protein C&S deficiencies, Antithrombin levels, and testing for antiphospholipid antibody syndrome, the only positive result was that pt found to have heterozygous factor V leiden mutation. Discussed the relative risk of developing a clot with this mutation but also shared that given his history of smoking that it his clot development was likely multifactorial. Shared my concern for increased risk of bleeding with adding DOAC or other blood thinner to ASA and plavix, thus recommended he continue ASA and plavix for now. Shared I would reach out to his cardiologist, his neurologist and his vascular surgeon so we could all agree on the anticoagulation plan going forward. Thus, have messaged Dr. Ronen Shah, Dr. Tommy Stoner, Dr. Tommy Nagel regarding my recommendations and for us to discuss his case further. Recommend he continue to follow up with these providers as well.     For now, recommend he continue aspirin and Plavix per neurology recommendations.  Plan to have patient follow-up in 4 weeks to discuss consensus of conversation of providers listed above.     Patient and wife verbalized understanding and agreement with plan.    Please note that portions of this note were completed with a voice recognition program.  Electronically signed by Omar Rosario MD, 06/09/23, 4:07 PM EDT.    Spoke with pt's wife to share final recommendations after discussion with his cardiologist, neurologist, vascular surgeon and myself. We all agreed for him to continue on dual anti-platelet therapy at this time alone given increased risk of bleeding with adding a DOAC or other blood thinner. I also recommended his children and any other descendants (I.e.  grandchildren) are tested for Factor V leiden mutation. His wife stated she would relay these recommendations to his children and any grandchildren.     Electronically signed by Omar Rosario MD, 06/12/23, 4:37 PM EDT.    Follow Up     I spent 60 minutes caring for Demetrio on this date of service. This time includes time spent by me in the following activities:preparing for the visit, reviewing tests, obtaining and/or reviewing a separately obtained history, performing a medically appropriate examination and/or evaluation , counseling and educating the patient/family/caregiver, ordering medications, tests, or procedures, referring and communicating with other health care professionals , documenting information in the medical record and care coordination.    This is an acute or chronic illness that poses a threat to life or bodily function. The above treatment plan involves a high risk of complications and/or mortality of patient management.    The patient was seen and examined. Work by the provider also included review and/or ordering of lab tests, review and/or ordering of radiology tests, review and/or ordering of medicine tests, discussion with other physicians or providers, independent review of data, obtaining old records, review/summation of old records, and/or other review.    I have reviewed the family history, social history, and past medical history for this patient. Previous information and data has been reviewed and updated as needed. I have reviewed and verified the chief complaint, history, and other documentation. The patient was interviewed and examined in the clinic and the chart reviewed. The previous observations, recommendations, and conclusions were reviewed including those of other providers.     The plan was discussed with the patient and/or family. The patient was given time to ask questions and these questions were answered. At the conclusion of their visit they had no additional  questions or concerns and all questions were answered to their satisfaction.    Patient was given instructions and counseling regarding his condition or for health maintenance advice. Please see specific information pulled into the AVS if appropriate.

## 2023-06-12 ENCOUNTER — TELEPHONE (OUTPATIENT)
Dept: ONCOLOGY | Facility: HOSPITAL | Age: 53
End: 2023-06-12
Payer: COMMERCIAL

## 2023-06-12 ENCOUNTER — OFFICE VISIT (OUTPATIENT)
Dept: DIABETES SERVICES | Facility: HOSPITAL | Age: 53
End: 2023-06-12
Payer: COMMERCIAL

## 2023-06-12 VITALS
HEART RATE: 80 BPM | DIASTOLIC BLOOD PRESSURE: 84 MMHG | HEIGHT: 67 IN | WEIGHT: 170 LBS | SYSTOLIC BLOOD PRESSURE: 123 MMHG | BODY MASS INDEX: 26.68 KG/M2 | TEMPERATURE: 97.1 F | OXYGEN SATURATION: 99 %

## 2023-06-12 DIAGNOSIS — E10.65 UNCONTROLLED TYPE 1 DIABETES MELLITUS WITH HYPERGLYCEMIA: Primary | ICD-10-CM

## 2023-06-12 LAB
EXPIRATION DATE: ABNORMAL
GLUCOSE BLDC GLUCOMTR-MCNC: 151 MG/DL (ref 70–99)
HBA1C MFR BLD: 9.3 %
Lab: ABNORMAL

## 2023-06-12 PROCEDURE — 82948 REAGENT STRIP/BLOOD GLUCOSE: CPT | Performed by: NURSE PRACTITIONER

## 2023-06-12 NOTE — TELEPHONE ENCOUNTER
Spoke with pt's wife to share final recommendations after discussion with his cardiologist, neurologist, vascular surgeon and myself. We all agreed for him to continue on dual anti-platelet therapy at this time alone given increased risk of bleeding with adding a DOAC or other blood thinner. I also recommended his children and any other descendants (I.e. grandchildren) are tested for Factor V leiden mutation. His wife stated she would relay these recommendations to his children and any grandchildren.     Electronically signed by Omar Rosario MD, 06/12/23, 4:37 PM EDT.

## 2023-06-12 NOTE — PATIENT INSTRUCTIONS
At each meal use the bolus wizard underneath the bolus screen.  First enter your glucose value from your dea device    Then enter carbohydrates using the scale below.  Take the combined dose to cover your glucose level and carbohydrates before the meal.    For small meal enter 30 g of carbohydrate  For medium meal enter 45 g of carbohydrate  For large meal enter 60 g of carbohydrate

## 2023-06-12 NOTE — PROGRESS NOTES
"Chief Complaint  Diabetes (Follow up, med mgt, A1c eval, cgm eval )    Referred By: ZEINAB Onofre    Subjective     {Problem List  Visit Diagnosis   Encounters  Notes  Medications  Labs  Result Review Imaging  Media :23}     Demetrio Mariano presents to [facility] for insulin pump management    History of Present Illness    Visit type:  follow-up  Diabetes type:  Type 1  Current diabetes status/concerns/issues:  ***  Other health concerns: He had a left basal ganglia stroke on 4/5/2023; he has had a prior right pontine ischemic stroke in January 2023.  There was also question of an acute lacunar infarct also most recently.  He has been diagnosed with factor V Leiden.  They are evaluating continued use of antiplatelet therapy versus other therapies.  There is concerns for bleeding risk.  The patient has stopped drinking alcoholic beverages and he is also quit smoking.  Current Diabetes symptoms:    Polyuria: {Yes No:79110::\"No\"}   Polydipsia: {Yes No:60337::\"No\"}   Polyphagia: {Yes No:05895::\"No\"}   Blurred vision: {Yes No:47754::\"No\"}   Excessive fatigue: {Yes No:94393::\"No\"}  Known Diabetes complications:  Neuropathy: Pain; Location: Feet and Legs  Renal: None  Eyes: Other: Patient reports diagnosis of diabetic retinopathy ; Location:  Unknown; there is no copy of eye exam report available  Amputation/Wounds: None  GI: None  Cardiovascular: CAD, CHF, and Cardiomyopathy  ED: Patient Reported  Other: None  Hypoglycemia:  {Hypoglycemia:02103}  Hypoglycemia Symptoms:  {Hypoglycemia Symptoms:92564}  Current diabetes treatment:  ***Medtronic pump currently using NovoLog insulin. He is only using his pump for delivery of basal insulin.   Blood glucose device:  Francis CGM  Blood glucose monitoring frequency:  Continuous per CGM  Blood glucose range/average: The 14-day sensor report shows a glucose average of 198 mg/dL with 44% of glucose levels following target range all 2% " are below target and 54% are above target.  He has some hypoglycemia in the early morning hours between 4 AM and 10 AM plus an occasional event in the evening at around 5 PM.  He has significant high glucose levels in the midday at around 3 PM and again in the evening after 11 PM     Glucose Source: Device Reviewed  Diet:   He eats 2 meals plus lots of snacking.  Activity/Exercise:  {DM Physical Activity:34985}    Past Medical History:   Diagnosis Date    CHF (congestive heart failure)     Coronary artery disease involving native heart without angina pectoris      Nonobstructive coronary artery disease involving diagonal branch per cardiac catheterization done on 01/21/2021    Essential hypertension, benign     Hypercholesterolemia     ICD (implantable cardioverter-defibrillator), dual, in situ 09/22/2021    Nicotine dependence     Non-ischemic cardiomyopathy (CMS/HCC) 05/03/2021    Mildly dilated left ventricular cavity with severe global hypokinesis with estimated LV ejection fraction of       28%. On echo May 3, 2021    Peyronie's disease     Seizures     started 2021    Stroke     Type 1 diabetes mellitus with vascular disease     Type I diabetes mellitus, uncontrolled      Past Surgical History:   Procedure Laterality Date    CARDIAC CATHETERIZATION      CARDIAC ELECTROPHYSIOLOGY PROCEDURE N/A 06/15/2021    Procedure: ICD SC new;  Surgeon: Regulo Coyne MD;  Location: [facility] CATH INVASIVE LOCATION;  Service: Cardiovascular;  Laterality: N/A;    FRACTURE SURGERY Right     crushed heel injury with 14 fractures    OTHER SURGICAL HISTORY  06/10/2013    plication for treatment of pyronie's disease    VASECTOMY       Family History   Problem Relation Age of Onset    Diabetes Mother     Diabetes Father      Social History     Socioeconomic History    Marital status:     Number of children: 2   Tobacco Use    Smoking status: Former     Packs/day: 1.00     Years: 37.00     Pack years: 37.00     Types: Cigarettes  "    Start date: 3/11/1986     Quit date: 2023     Years since quittin.1     Passive exposure: Past    Smokeless tobacco: Never   Vaping Use    Vaping Use: Never used   Substance and Sexual Activity    Alcohol use: Never     Alcohol/week: 1.0 standard drink     Types: 1 Cans of beer per week    Drug use: Never    Sexual activity: Defer     No Known Allergies    Current Outpatient Medications:     atorvastatin (LIPITOR) 80 MG tablet, Take 1 tablet by mouth Daily., Disp: , Rfl:     carvedilol (COREG) 25 MG tablet, Take 1 tablet by mouth 2 (Two) Times a Day., Disp: 180 tablet, Rfl: 2    clopidogrel (PLAVIX) 75 MG tablet, Take 1 tablet by mouth Daily., Disp: , Rfl:     Continuous Blood Gluc Sensor (FreeStyle Francis 2 Sensor) misc, 1 each Every 14 (Fourteen) Days., Disp: 2 each, Rfl: 11    furosemide (LASIX) 20 MG tablet, Take 1 tablet by mouth Daily., Disp: , Rfl:     Glucagon (Baqsimi Two Pack) 3 MG/DOSE powder, 3 mg into the nostril(s) as directed by provider As Needed (Severe hypoglycemia)., Disp: 1 each, Rfl: 2    Insulin Aspart (NovoLOG) 100 UNIT/ML injection, Up to 100 per day per insulin pump, Disp: 30 mL, Rfl: 3    insulin patient supplied pump, Inject  under the skin into the appropriate area as directed Continuous. Type of Insulin: NOVOLOG Basal Dose:  12 AM to 6 AM - 2.15u/hr 6 AM to 3 PM - 1.9u/hr 3 PM to 12 AM - 2.115u/hr  Bolus Dose: CORRECTION RATIO 1:40 Prescriber:KATHERINE ROWLAND Phone number: Next refill: 23 MEDTRONIC PUMP, Disp: , Rfl:     sacubitril-valsartan (ENTRESTO)  MG tablet, Take 1 tablet by mouth 2 (Two) Times a Day., Disp: 180 tablet, Rfl: 3    spironolactone (ALDACTONE) 25 MG tablet, Take 1 tablet by mouth Daily., Disp: 90 tablet, Rfl: 3    Objective     Vitals:    23 1339   BP: 123/84   BP Location: Right arm   Patient Position: Sitting   Cuff Size: Adult   Pulse: 80   Temp: 97.1 °F (36.2 °C)   SpO2: 99%   Weight: 77.1 kg (170 lb)   Height: 170.2 cm (67\")   PainSc: 0-No " "pain     Body mass index is 26.63 kg/m².    Physical Exam    {DIABETIC FOOT EXAM FOR  (Optional):36948::\"Diabetic Foot Exam Performed\":0}    Result Review :{Labs  Result Review  Imaging  Med Tab  Media :23}   The following data was reviewed by: ZEINAB Goel on 06/12/2023:    Most Recent A1C          6/12/2023    13:50   HGBA1C Most Recent   Hemoglobin A1C 9.3        A1C Last 3 Results          4/5/2023    22:33 4/11/2023    13:03 6/12/2023    13:50   HGBA1C Last 3 Results   Hemoglobin A1C 10.40  9.90  9.3      Point-of-care A1c in the office is 9.3% indicating uncontrolled type 1 diabetes.  This is down from the prior result of 9.9 collected in April of this year    Glucose   Date Value Ref Range Status   06/12/2023 151 (H) 70 - 99 mg/dL Final     Comment:     Serial Number: [id number]:  [id number]     Point-of-care glucose in the office is within normal limits for nonfasting glucose    Creatinine   Date Value Ref Range Status   05/19/2023 0.84 0.76 - 1.27 mg/dL Final   04/13/2023 0.79 0.76 - 1.27 mg/dL Final     eGFR   Date Value Ref Range Status   05/19/2023 105.6 >60.0 mL/min/1.73 Final   04/13/2023 107.6 >60.0 mL/min/1.73 Final     Labs collected on 5/19/2023 show normal renal function    Total Cholesterol   Date Value Ref Range Status   04/11/2023 146 0 - 200 mg/dL Final   04/07/2023 189 0 - 200 mg/dL Final     Triglycerides   Date Value Ref Range Status   04/11/2023 73 0 - 150 mg/dL Final   04/07/2023 90 0 - 150 mg/dL Final     HDL Cholesterol   Date Value Ref Range Status   04/11/2023 48 40 - 60 mg/dL Final   04/07/2023 41 40 - 60 mg/dL Final     LDL Cholesterol    Date Value Ref Range Status   04/11/2023 84 0 - 100 mg/dL Final   04/07/2023 131 (H) 0 - 100 mg/dL Final     Lipid panel collected on 4/11/2023 shows normal lipid values.  LDL is elevated above expected values for patient with diabetes       {CC Problem List  Visit Diagnosis  ROS  Review (Popup)  Health Maintenance  " Quality  BestPractice  Medications  SmartSets  SnapShot Encounters  Media :23}     Assessment: ***      Diagnoses and all orders for this visit:    1. Uncontrolled type 1 diabetes mellitus with hyperglycemia (Primary)  -     POC Glycosylated Hemoglobin (Hb A1C)    Other orders  -     POC Glucose        Plan: ***    The patient will monitor his blood glucose levels ***.  If he develops problematic hyperglycemia or hypoglycemia or adverse drug reactions, he will contact the office for further instructions.    {Time Spent (Optional):26454}    Follow Up {Instructions Charge Capture  Follow-up Communications :23}    No follow-ups on file.    Patient was given instructions and counseling regarding his condition or for health maintenance advice. Please see specific information pulled into the AVS if appropriate.     Jennifer Delgado, APRN  06/12/2023      Dictated Utilizing Dragon Dictation.  Please note that portions of this note were completed with a voice recognition program.  Part of this note may be an electronic transcription/translation of spoken language to printed text using the Dragon Dictation System.   reactions, he will contact the office for further instructions.        Follow Up     Return in about 2 months (around 8/12/2023) for Pump Eval, CGM Follow-up.    Patient was given instructions and counseling regarding his condition or for health maintenance advice. Please see specific information pulled into the AVS if appropriate.     Jennifer Delgado, ZEINAB  06/12/2023      Dictated Utilizing Dragon Dictation.  Please note that portions of this note were completed with a voice recognition program.  Part of this note may be an electronic transcription/translation of spoken language to printed text using the Dragon Dictation System.

## 2023-08-02 DIAGNOSIS — E10.65 UNCONTROLLED TYPE 1 DIABETES MELLITUS WITH HYPERGLYCEMIA: ICD-10-CM

## 2023-08-07 NOTE — TELEPHONE ENCOUNTER
He will need to continue Plavix 75 mg daily.   Ideally this would be something his pcp or neurology would refill, please contact pt/wife, and let them know we will send in a 90 day script since he is out, but recommend to have pcp/neuro take over after that.

## 2023-08-07 NOTE — TELEPHONE ENCOUNTER
Caller: NILO    Relationship: WIFE    Best call back number: [phone]    Requested Prescriptions:   Requested Prescriptions     Pending Prescriptions Disp Refills    clopidogrel (PLAVIX) 75 MG tablet 30 tablet      Sig: Take 1 tablet by mouth Daily.        Pharmacy where request should be sent: [id number] [place], [place] - [address] [facility] [address] - [phone] [facility] [phone] FX     Last office visit with prescribing clinician: 5/31/2022   Last telemedicine visit with prescribing clinician: Visit date not found   Next office visit with prescribing clinician: 11/6/2023     Additional details provided by patient: PATIENT IS COMPLETELY OUT AS OF LAST NIGHT. PLEASE ADVISE IF PATIENT NEEDS TO STAY ON THIS OR SOMETHING ELSE. THANK YOU!    Does the patient have less than a 3 day supply:  [x] Yes  [] No    Would you like a call back once the refill request has been completed: [x] Yes [] No    If the office needs to give you a call back, can they leave a voicemail: [] Yes [] No    Juany June Rep   08/07/23 11:26 EDT

## 2023-08-08 RX ORDER — CLOPIDOGREL BISULFATE 75 MG/1
75 TABLET ORAL DAILY
Qty: 90 TABLET | Refills: 0 | Status: SHIPPED | OUTPATIENT
Start: 2023-08-08

## 2023-08-08 NOTE — TELEPHONE ENCOUNTER
SW PT WIFE.  ADVISED 90 SCRIPT SENT TO PHARMACY AND AFTER THAT REFILLS NEED TO COME FROM PCP OR NEURO PER HOLLY ALMONTE, APRN.

## 2023-08-11 NOTE — PROGRESS NOTES
Chief Complaint  Diabetes (Follow up, med mgt, A1c eval, cgm/pump eval )    Referred By: ZEINAB Onofre presents to [facility] for diabetes medication management    History of Present Illness    Visit type:  follow-up  Diabetes type:  Type 1  Current diabetes status/concerns/issues:  He is doing better with entering his carbs but admits to continued struggles with entering them at times  Other health concerns: no new health issues; he is regaining some strength on his right side  Current Diabetes symptoms:    Polyuria: No   Polydipsia: No   Polyphagia: No   Blurred vision: No   Excessive fatigue: No  Known Diabetes complications:  Neuropathy: Pain; Location: Feet and Legs  Renal: None  Eyes: Other: Patient reports diagnosis of diabetic retinopathy ; Location:  Unknown; there is no copy of eye exam report available  Amputation/Wounds: None  GI: None  Cardiovascular: CAD, CHF, Cardiomyopathy, and CVA (History of)  ED: Patient Reported  Other: None  Hypoglycemia:  Level 1 hypoglycemia (54 mg/dL - 70 mg/dL); Frequency - 3% per CGM; episodes are occurring around 6 AM, 3 PM and 6 PM  Hypoglycemia Symptoms:  patient has hypoglycemia unawareness  Current diabetes treatment:  Medtronic pump currently using NovoLog insulin.  He is using 30 g for a small meal, 45 g for medium size meal and 60 g for large meal when entering carbohydrates into the pump.  There are missed meals and missed glucose entries into his pump  Blood glucose device:  Francis CGM  Blood glucose monitoring frequency:  Continuous per CGM  Blood glucose range/average:  The 14-day sensor report shows an average glucose of 176mg/dL, with 56% in target range ( mgdL), 25% in the high range (181-250 mg/dL), 16% in the very high range (>250 mg/dL), 3% in the low range (54-70 mg/dL) and 0% in the very low range (<54 mg/dL).   Glucose Source: Device Reviewed  Diet:  Limits high  carb/sweet foods, Avoids sugary drinks  Activity/Exercise:  None    Past Medical History:   Diagnosis Date    CHF (congestive heart failure)     Coronary artery disease involving native heart without angina pectoris      Nonobstructive coronary artery disease involving diagonal branch per cardiac catheterization done on 2021    Essential hypertension, benign     Hypercholesterolemia     ICD (implantable cardioverter-defibrillator), dual, in situ 2021    Nicotine dependence     Non-ischemic cardiomyopathy (CMS/HCC) 2021    Mildly dilated left ventricular cavity with severe global hypokinesis with estimated LV ejection fraction of       28%. 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     Type I diabetes mellitus, uncontrolled      Past Surgical History:   Procedure Laterality Date    CARDIAC CATHETERIZATION      CARDIAC ELECTROPHYSIOLOGY PROCEDURE N/A 06/15/2021    Procedure: ICD SC new;  Surgeon: Regulo Coyne MD;  Location: [facility] INVASIVE LOCATION;  Service: Cardiovascular;  Laterality: N/A;    FRACTURE SURGERY Right     crushed heel injury with 14 fractures    OTHER SURGICAL HISTORY  06/10/2013    plication for treatment of pyronie's disease    VASECTOMY       Family History   Problem Relation Age of Onset    Diabetes Mother     Diabetes Father     Neuropathy Father      Social History     Socioeconomic History    Marital status:     Number of children: 2   Tobacco Use    Smoking status: Former     Packs/day: 1.00     Years: 37.00     Pack years: 37.00     Types: Cigarettes     Start date: 3/11/1986     Quit date: 2023     Years since quittin.4     Passive exposure: Past    Smokeless tobacco: Never   Vaping Use    Vaping Use: Never used   Substance and Sexual Activity    Alcohol use: Yes     Alcohol/week: 1.0 standard drink     Types: 1 Cans of beer per week     Comment: daily    Drug  "use: Never    Sexual activity: Not Currently     Partners: Female     Birth control/protection: Condom, Pill     No Known Allergies    Current Outpatient Medications:     atorvastatin (LIPITOR) 80 MG tablet, Take 1 tablet by mouth Daily., Disp: , Rfl:     carvedilol (COREG) 25 MG tablet, Take 1 tablet by mouth 2 (Two) Times a Day., Disp: 180 tablet, Rfl: 2    clopidogrel (PLAVIX) 75 MG tablet, Take 1 tablet by mouth Daily., Disp: 90 tablet, Rfl: 0    Continuous Blood Gluc Sensor (FreeStyle Francis 2 Sensor) misc, REPLACE SENSOR EVERY 14 DAYS, Disp: 2 each, Rfl: 11    furosemide (LASIX) 20 MG tablet, Take 1 tablet by mouth Daily., Disp: , Rfl:     Glucagon (Baqsimi Two Pack) 3 MG/DOSE powder, 3 mg into the nostril(s) as directed by provider As Needed (Severe hypoglycemia)., Disp: 1 each, Rfl: 2    Insulin Aspart (NovoLOG) 100 UNIT/ML injection, Up to 100 per day per insulin pump, Disp: 30 mL, Rfl: 3    insulin patient supplied pump, Inject  under the skin into the appropriate area as directed Continuous. Type of Insulin: NOVOLOG Basal Dose:  12 AM to 6 AM - 2.15u/hr 6 AM to 3 PM - 1.9u/hr 3 PM to 12 AM - 2.115u/hr  Bolus Dose: CORRECTION RATIO 1:40 Prescriber:KATHERINE ROWLAND Phone number: Next refill: 4/14/23 MEDTRONIC PUMP, Disp: , Rfl:     sacubitril-valsartan (ENTRESTO)  MG tablet, Take 1 tablet by mouth 2 (Two) Times a Day., Disp: 180 tablet, Rfl: 3    spironolactone (ALDACTONE) 25 MG tablet, Take 1 tablet by mouth Daily., Disp: 90 tablet, Rfl: 3    Objective     Vitals:    08/14/23 1637   BP: 124/73   BP Location: Right arm   Patient Position: Sitting   Cuff Size: Adult   Pulse: 75   Temp: 97.4 °F (36.3 °C)   SpO2: 99%   Weight: 80.3 kg (177 lb)   Height: 170.2 cm (67\")   PainSc: 0-No pain     Body mass index is 27.72 kg/m².    Physical Exam  Constitutional:       Appearance: Normal appearance. He is normal weight.      Comments: Overweight (BMI 25 - 29.9) Pt Current BMI = 27.72    HENT:      Head: " Normocephalic and atraumatic.      Right Ear: External ear normal.      Left Ear: External ear normal.      Nose: Nose normal.   Eyes:      Extraocular Movements: Extraocular movements intact.      Conjunctiva/sclera: Conjunctivae normal.   Pulmonary:      Effort: Pulmonary effort is normal.   Musculoskeletal:         General: Normal range of motion.      Cervical back: Normal range of motion.   Skin:     General: Skin is warm and dry.   Neurological:      General: No focal deficit present.      Mental Status: He is alert and oriented to person, place, and time. Mental status is at baseline.   Psychiatric:         Mood and Affect: Mood normal.         Behavior: Behavior normal.         Thought Content: Thought content normal.         Judgment: Judgment normal.           Result Review :   The following data was reviewed by: ZEINAB Goel on 08/14/2023:    Most Recent A1C          8/14/2023    16:44   HGBA1C Most Recent   Hemoglobin A1C 8.8        A1C Last 3 Results          4/11/2023    13:03 6/12/2023    13:50 8/14/2023    16:44   HGBA1C Last 3 Results   Hemoglobin A1C 9.90  9.3  8.8      A1c collected in the office today is 8.8%, indicating Uncontrolled Type I diabetes.  This result is down from the prior result of 9.3% collected on 6/12/2023    Glucose   Date Value Ref Range Status   08/14/2023 194 (H) 70 - 99 mg/dL Final     Comment:     Serial Number: [id number]:  [id number]     Point of care glucose in the office today is  elevated at 194 mg/dL          Assessment: He has had improvement in his A1c but remains above target.  He has had some improvement with compliance with use of his pump but does continue to struggle with entering carbs and glucose levels at times.  He is having some nocturnal hypoglycemia      Diagnoses and all orders for this visit:    1. Uncontrolled type 1 diabetes mellitus with hyperglycemia (Primary)  -     POC Glycosylated Hemoglobin (Hb A1C)    2. Type 1 diabetes  mellitus with diabetic neuropathy    3. Overweight (BMI 25.0-29.9)    4. Insulin pump in place    5. Insulin pump titration    6. Uses self-applied continuous glucose monitoring device    Other orders  -     POC Glucose        Plan: To minimize the risk for nocturnal hypoglycemia the 12 AM to 6 AM basal rate of 2.15 was decreased to 1.95.  The patient is to focus on entering carbohydrates and glucose levels consistently at each meal to prevent postprandial hyperglycemia.    The patient will monitor his blood glucose levels using his continuous glucose sensor.  If he develops problematic hyperglycemia or hypoglycemia or adverse drug reactions, he will contact the office for further instructions.        Follow Up     Return in about 3 months (around 11/14/2023) for CGM Follow-up.    Patient was given instructions and counseling regarding his condition or for health maintenance advice. Please see specific information pulled into the AVS if appropriate.     Jennifer Delgado, APRN  08/14/2023      Dictated Utilizing Dragon Dictation.  Please note that portions of this note were completed with a voice recognition program.  Part of this note may be an electronic transcription/translation of spoken language to printed text using the Dragon Dictation System.

## 2023-08-14 ENCOUNTER — OFFICE VISIT (OUTPATIENT)
Dept: DIABETES SERVICES | Facility: HOSPITAL | Age: 53
End: 2023-08-14
Payer: COMMERCIAL

## 2023-08-14 VITALS
HEART RATE: 75 BPM | OXYGEN SATURATION: 99 % | BODY MASS INDEX: 27.78 KG/M2 | DIASTOLIC BLOOD PRESSURE: 73 MMHG | SYSTOLIC BLOOD PRESSURE: 124 MMHG | HEIGHT: 67 IN | TEMPERATURE: 97.4 F | WEIGHT: 177 LBS

## 2023-08-14 DIAGNOSIS — E10.40 TYPE 1 DIABETES MELLITUS WITH DIABETIC NEUROPATHY: ICD-10-CM

## 2023-08-14 DIAGNOSIS — Z96.41 INSULIN PUMP IN PLACE: ICD-10-CM

## 2023-08-14 DIAGNOSIS — Z97.8 USES SELF-APPLIED CONTINUOUS GLUCOSE MONITORING DEVICE: ICD-10-CM

## 2023-08-14 DIAGNOSIS — E66.3 OVERWEIGHT (BMI 25.0-29.9): ICD-10-CM

## 2023-08-14 DIAGNOSIS — E10.65 UNCONTROLLED TYPE 1 DIABETES MELLITUS WITH HYPERGLYCEMIA: Primary | ICD-10-CM

## 2023-08-14 DIAGNOSIS — Z46.81 INSULIN PUMP TITRATION: ICD-10-CM

## 2023-08-14 LAB
EXPIRATION DATE: ABNORMAL
GLUCOSE BLDC GLUCOMTR-MCNC: 194 MG/DL (ref 70–99)
HBA1C MFR BLD: 8.8 %
Lab: ABNORMAL

## 2023-08-14 PROCEDURE — 99214 OFFICE O/P EST MOD 30 MIN: CPT | Performed by: NURSE PRACTITIONER

## 2023-08-14 PROCEDURE — 95251 CONT GLUC MNTR ANALYSIS I&R: CPT | Performed by: NURSE PRACTITIONER

## 2023-08-14 PROCEDURE — G0463 HOSPITAL OUTPT CLINIC VISIT: HCPCS | Performed by: NURSE PRACTITIONER

## 2023-08-14 PROCEDURE — 82948 REAGENT STRIP/BLOOD GLUCOSE: CPT | Performed by: NURSE PRACTITIONER

## 2023-08-22 ENCOUNTER — TELEPHONE (OUTPATIENT)
Dept: CARDIOLOGY | Facility: CLINIC | Age: 53
End: 2023-08-22
Payer: COMMERCIAL

## 2023-08-22 NOTE — TELEPHONE ENCOUNTER
ENTRESTO PA STARTED    Key: [id number]       PT WIFE CALLED AND STATED HE HAS NEW ANTHEM INS NOW AND THEY TOLD HER ENTRESTO IS NOT ON FORMULARY.

## 2023-08-22 NOTE — TELEPHONE ENCOUNTER
ENTRESTO PA APPROVED    PA Case: [id number], Status: Approved, Coverage Starts on: 8/22/2023 12:00:00 AM, Coverage Ends on: 8/21/2024 12:00:00 AM.    PT WIFE NILO NOTIFIED ABOUT PA APPROVAL AND PT ASSISTANCE

## 2023-08-22 NOTE — TELEPHONE ENCOUNTER
The [facility] received a fax that requires your attention. The document has been indexed to the patient's chart for your review.      Reason for sending: EXTERNAL MEDICAL RECORD NOTIFICATION    Documents Description: PRIOR AUTH NEEDED FOR ENTRESTO  MG TAB    Name of Sender: SAIRACARMEN    Date Indexed: 8/22/23    Notes (if needed): SEE FAX IN CHART UNDER MEDICATIONS

## 2023-09-07 DIAGNOSIS — I42.8 NON-ISCHEMIC CARDIOMYOPATHY: Chronic | ICD-10-CM

## 2023-09-07 DIAGNOSIS — I10 ESSENTIAL HYPERTENSION: ICD-10-CM

## 2023-09-07 RX ORDER — CARVEDILOL 25 MG/1
TABLET ORAL
Qty: 180 TABLET | Refills: 0 | Status: SHIPPED | OUTPATIENT
Start: 2023-09-07

## 2023-09-07 RX ORDER — ATORVASTATIN CALCIUM 80 MG/1
80 TABLET, FILM COATED ORAL DAILY
Qty: 90 TABLET | Refills: 0 | Status: SHIPPED | OUTPATIENT
Start: 2023-09-07

## 2023-10-09 RX ORDER — CLOPIDOGREL BISULFATE 75 MG/1
75 TABLET ORAL DAILY
Qty: 90 TABLET | Refills: 0 | Status: SHIPPED | OUTPATIENT
Start: 2023-10-09

## 2023-11-06 ENCOUNTER — OFFICE VISIT (OUTPATIENT)
Dept: CARDIOLOGY | Facility: CLINIC | Age: 53
End: 2023-11-06
Payer: COMMERCIAL

## 2023-11-06 VITALS
WEIGHT: 177 LBS | SYSTOLIC BLOOD PRESSURE: 118 MMHG | HEART RATE: 77 BPM | BODY MASS INDEX: 27.78 KG/M2 | DIASTOLIC BLOOD PRESSURE: 72 MMHG | HEIGHT: 67 IN

## 2023-11-06 DIAGNOSIS — I63.9 RECURRENT STROKES: ICD-10-CM

## 2023-11-06 DIAGNOSIS — I25.10 CORONARY ARTERY DISEASE INVOLVING NATIVE CORONARY ARTERY OF NATIVE HEART WITHOUT ANGINA PECTORIS: ICD-10-CM

## 2023-11-06 DIAGNOSIS — I42.8 NON-ISCHEMIC CARDIOMYOPATHY: Primary | Chronic | ICD-10-CM

## 2023-11-06 DIAGNOSIS — E78.2 MIXED HYPERLIPIDEMIA: ICD-10-CM

## 2023-11-06 DIAGNOSIS — Z95.810 ICD (IMPLANTABLE CARDIOVERTER-DEFIBRILLATOR), DUAL, IN SITU: Chronic | ICD-10-CM

## 2023-11-06 NOTE — PROGRESS NOTES
CARDIOLOGY FOLLOW-UP PROGRESS NOTE        Chief Complaint  Follow-up, Cardiomyopathy, Hyperlipidemia, and Hypertension    Subjective            Demetrio Mariano presents to [facility]  History of Present Illness    Mr Mariano is here for a follow-up visit accompanied by his wife.  Patient had 3 episodes of ischemic strokes from January to April this year.  He was evaluated by neurologist and hematologist.  Work-up for hypercoagulable state showed heterozygous mutation for factor V Leyden.  After discussion with multiple specialists, it was decided to continue with Plavix.  He stopped smoking in April and also quit drinking alcohol.  He has not had any recent episodes of chest pain.  No recent weight gain, shortness of breath or palpitations.  No bleeding problems.  He has mild residual deficits from previous strokes.  He had an implantable loop recorder placed at the time of index stroke, which was treated at [facility].      Past History:    1) Dilated cardiomyopathy with LV ejection fraction of 20-25%. 2) Nonobstructive coronary artery disease involving diagonal branch per cardiac catheterization done on 01/21/2021. 3) Diabetes mellitus, on insulin pump. 4) 3 episodes of ischemic stroke ( 1/2023 0.9, 4/5/2023 with slurring of speech and balance, left basal ganglia,   And 4/11/2023 with right arm and leg weakness)     Medical History:  Past Medical History:   Diagnosis Date    CHF (congestive heart failure)     Coronary artery disease involving native heart without angina pectoris      Nonobstructive coronary artery disease involving diagonal branch per cardiac catheterization done on 01/21/2021    Essential hypertension, benign     Hypercholesterolemia     ICD (implantable cardioverter-defibrillator), dual, in situ 09/22/2021    Nicotine dependence     Non-ischemic cardiomyopathy (CMS/HCC) 05/03/2021    Mildly dilated left ventricular cavity with severe global hypokinesis  with estimated LV ejection fraction of       28%. On echo May 3, 2021    Peyronie's disease     Seizures     started 2021    Stroke     Type 1 diabetes mellitus with vascular disease     Type I diabetes mellitus, uncontrolled        Surgical History: has a past surgical history that includes Other surgical history (06/10/2013); Vasectomy; Cardiac catheterization; Fracture surgery (Right); and Cardiac electrophysiology procedure (N/A, 06/15/2021).     Family History: family history includes Diabetes in his father and mother; Neuropathy in his father.     Social History: reports that he quit smoking about 7 months ago. His smoking use included cigarettes. He started smoking about 37 years ago. He has a 37.00 pack-year smoking history. He has been exposed to tobacco smoke. He has never used smokeless tobacco. He reports current alcohol use of about 1.0 standard drink of alcohol per week. He reports that he does not use drugs.    Allergies: Patient has no known allergies.    Current Outpatient Medications on File Prior to Visit   Medication Sig    atorvastatin (LIPITOR) 80 MG tablet Take 1 tablet by mouth Daily.    carvedilol (COREG) 25 MG tablet TAKE 1 TABLET BY MOUTH TWICE DAILY    clopidogrel (PLAVIX) 75 MG tablet TAKE 1 TABLET BY MOUTH DAILY    Continuous Blood Gluc Sensor (FreeStyle Francis 2 Sensor) misc REPLACE SENSOR EVERY 14 DAYS    Glucagon (Baqsimi Two Pack) 3 MG/DOSE powder 3 mg into the nostril(s) as directed by provider As Needed (Severe hypoglycemia).    insulin patient supplied pump Inject  under the skin into the appropriate area as directed Continuous. Type of Insulin: NOVOLOG  Basal Dose:   12 AM to 6 AM - 2.15u/hr  6 AM to 3 PM - 1.9u/hr  3 PM to 12 AM - 2.115u/hr    Bolus Dose: CORRECTION RATIO 1:40  Prescriber:KATHERINE ROWLAND  Phone number:  Next refill: 4/14/23  MEDTRONIC PUMP    sacubitril-valsartan (ENTRESTO)  MG tablet Take 1 tablet by mouth 2 (Two) Times a Day.    spironolactone (ALDACTONE)  "25 MG tablet Take 1 tablet by mouth Daily.     No current facility-administered medications on file prior to visit.          Review of Systems   Respiratory:  Negative for cough, shortness of breath and wheezing.    Cardiovascular:  Negative for chest pain, palpitations and leg swelling.   Gastrointestinal:  Negative for nausea and vomiting.   Neurological:  Negative for dizziness and syncope.        Objective     /72   Pulse 77   Ht 170.2 cm (67\")   Wt 80.3 kg (177 lb)   BMI 27.72 kg/m²       Physical Exam    General : Alert, awake, no acute distress  Neck : Supple, no carotid bruit, no jugular venous distention  CVS : Regular rate and rhythm, no murmur, rubs or gallops  Lungs: Clear to auscultation bilaterally, no crackles or rhonchi  Abdomen: Soft, nontender, bowel sounds heard in all 4 quadrants  Extremities: Warm, well-perfused, no pedal edema    Result Review :     The following data was reviewed by: Tommy Nagel MD on 11/06/2023:    CMP          4/11/2023    13:03 4/13/2023    05:31 5/19/2023    12:02   CMP   Glucose 189  93  194    BUN 20  23  20    Creatinine 0.78  0.79  0.84    EGFR 108.0  107.6  105.6    Sodium 137  141  135    Potassium 4.5  4.0  5.4    Chloride 100  109  100    Calcium 9.2  8.7  9.5    Total Protein 7.2   7.4    Albumin 4.6   4.8    Globulin 2.6   2.6    Total Bilirubin 0.7   0.5    Alkaline Phosphatase 109   117    AST (SGOT) 20   20    ALT (SGPT) 33   40    Albumin/Globulin Ratio 1.8   1.8    BUN/Creatinine Ratio 25.6  29.1  23.8    Anion Gap 12.7  10.0  10.9      CBC          4/11/2023    13:03 4/13/2023    05:31 5/19/2023    12:02   CBC   WBC 11.68  8.71  8.14    RBC 4.14  4.00  4.16    Hemoglobin 13.2  12.7  13.3    Hematocrit 37.0  35.7  38.8    MCV 89.4  89.3  93.3    MCH 31.9  31.8  32.0    MCHC 35.7  35.6  34.3    RDW 13.2  13.2  12.7    Platelets 310  323  324        Lipid Panel          4/7/2023    04:57 4/11/2023    13:03   Lipid Panel   Total Cholesterol 189  " 146    Triglycerides 90  73    HDL Cholesterol 41  48    VLDL Cholesterol 17  14    LDL Cholesterol  131  84    LDL/HDL Ratio 3.17  1.74           Data reviewed: Cardiology studies        Results for orders placed during the hospital encounter of 04/11/23    Adult Transthoracic Echo Complete W/ Cont if Necessary Per Protocol (With Agitated Saline)    Interpretation Summary  Borderline normal left ventricular systolic function ejection fraction around 50%.  Trace MR and trace TR.                   Assessment and Plan        Diagnoses and all orders for this visit:    1. Non-ischemic cardiomyopathy (Primary)  Assessment & Plan:  He is clinically not volume overloaded.  He has NYHA class II symptoms at this time.  Echocardiogram done in April showed ejection fraction 50%, which is an improvement from before.  He is on maximal dose of carvedilol and Entresto which will be continued along with spironolactone.  No current indication for loop diuretics.  He already has an ICD in place.  We will do complete metabolic panel now.    Orders:  -     Cancel: CBC (No Diff); Future  -     CBC (No Diff); Future    2. ICD (implantable cardioverter-defibrillator), dual, in situ  Assessment & Plan:  Normal device function per home remote interrogation.  He also has a loop recorder implanted from [facility] for stroke work-up.  So far, he did not show any evidence of atrial arrhythmias.  We will continue to monitor both.      3. Mixed hyperlipidemia  Assessment & Plan:  His LDL was 84 per labs done in March.  He has diabetes, nonobstructive CAD and recurrent strokes.  Goal LDL is under 70.  We will repeat lipid panel now.  Continue atorvastatin 80 mg daily for now.    Orders:  -     Comprehensive Metabolic Panel; Future  -     Cancel: Lipid Panel; Future  -     Lipid Panel; Future    4. Coronary artery disease involving native coronary artery of native heart without angina pectoris  Assessment & Plan:  Currently stable with no  angina.  Continue Plavix and statins.      5. Recurrent strokes  Assessment & Plan:  He underwent extensive work-up recently.  So far loop recorder did not show any evidence of atrial arrhythmias.  Echocardiogram showed normal LV function with no intracardiac thrombus.  Hypercoagulable work-up was normal except for heterozygous factor V Leiden mutation.  After discussion with multiple specialist, hematologist advised to continue with aspirin and Plavix.  He is currently on Plavix monotherapy.  Statins will be continued.  He already quit smoking.                Follow Up     Return in about 6 months (around 5/6/2024) for Next scheduled follow up.    Patient was given instructions and counseling regarding his condition or for health maintenance advice. Please see specific information pulled into the AVS if appropriate.

## 2023-11-08 ENCOUNTER — TELEPHONE (OUTPATIENT)
Dept: NEUROLOGY | Facility: CLINIC | Age: 53
End: 2023-11-08
Payer: COMMERCIAL

## 2023-11-08 NOTE — TELEPHONE ENCOUNTER
Caller: NILO ELDRIDGE    Relationship: Emergency Contact    Best call back number: [phone]    What is the best time to reach you: ANYTIME    What was the call regarding: PATIENT'S WIFE WANTS TO SURPRISE HIM FOR HIS BIRTHDAY WITH A MASSAGE BUT THEY MASSAGE THERAPIST TOLD HER TO GET CLEARANCE FROM HIS NEURO SINCE HE HAS BLOOD CLOTS AND HAD 3 STROKES THIS YEAR.     IS HE ALLOWED TO HAVE A MASSAGE? NILO ASKS IF SOMEONE COULD CALL AND LET HER KNOW.    Is it okay if the provider responds through MyChart: NO    PLEASE REVIEW  THANK YOU

## 2023-11-10 DIAGNOSIS — I42.8 NON-ISCHEMIC CARDIOMYOPATHY: Chronic | ICD-10-CM

## 2023-11-10 DIAGNOSIS — E78.2 MIXED HYPERLIPIDEMIA: ICD-10-CM

## 2023-11-10 NOTE — TELEPHONE ENCOUNTER
Spoke with pt wife and let her know Dr. Stoner said from a stroke standpoint he is ok with a massage, but needs to talk with PCP regarding risk from hx of blood clots. Verbalized understanding.

## 2023-11-13 NOTE — PROGRESS NOTES
"Chief Complaint  Diabetes (Follow up, med mgt, A1c eval, pump eval )    Referred By: ZEINAB Onofre    Subjective     {Problem List  Visit Diagnosis   Encounters  Notes  Medications  Labs  Result Review Imaging  Media :23}     Demetrio Mariano presents to [facility] for diabetes medication management    History of Present Illness    Visit type:  follow-up  Diabetes type:  Type 1  Current diabetes status/concerns/issues:  ***  Other health concerns: ***  Current Diabetes symptoms:    Polyuria: No   Polydipsia: No   Polyphagia: No   Blurred vision: No   Excessive fatigue: No  Known Diabetes complications:  Neuropathy: Pain; Location: Feet and Legs  Renal: None  Eyes: No current eye exam available in record and Other: he reports having diabetic retinopathy  Amputation/Wounds: None  GI: None  Cardiovascular: CAD, CHF, Cardiomyopathy, and CVA (History of)  ED: Patient Reported  Other: None  Hypoglycemia:  Level 1 hypoglycemia (54 mg/dL - 70 mg/dL); Frequency - 1% per CGM  Hypoglycemia Symptoms:  patient has hypoglycemia unawareness  Current diabetes treatment:  Medtronic pump currently using NovoLog insulin.  He is using 30 g for a small meal, 45 g for medium size meal and 60 g for large meal when entering carbohydrates into the pump.    Blood glucose device:  Francis CGM  Blood glucose monitoring frequency:  Continuous per CGM  Blood glucose range/average:  The 14-day sensor report shows an average glucose of 218 mg/dL, with 38% in target range ( mgdL), 25% in the high range (181-250 mg/dL), 36% in the very high range (>250 mg/dL), 1% in the low range (54-70 mg/dL) and 0% in the very low range (<54 mg/dL).   Glucose Source: Device Reviewed  Diet:   he is eating poorly; he is eating a lot of \"junk\" food.  Activity/Exercise:  None    Past Medical History:   Diagnosis Date    CHF (congestive heart failure)     Coronary artery disease involving native heart without angina " pectoris      Nonobstructive coronary artery disease involving diagonal branch per cardiac catheterization done on 2021    Essential hypertension, benign     Hypercholesterolemia     ICD (implantable cardioverter-defibrillator), dual, in situ 2021    Nicotine dependence     Non-ischemic cardiomyopathy (CMS/HCC) 2021    Mildly dilated left ventricular cavity with severe global hypokinesis with estimated LV ejection fraction of       28%. 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     Type I diabetes mellitus, uncontrolled      Past Surgical History:   Procedure Laterality Date    CARDIAC CATHETERIZATION      CARDIAC ELECTROPHYSIOLOGY PROCEDURE N/A 06/15/2021    Procedure: ICD SC new;  Surgeon: Regulo Coyne MD;  Location: [facility] CATH INVASIVE LOCATION;  Service: Cardiovascular;  Laterality: N/A;    FRACTURE SURGERY Right     crushed heel injury with 14 fractures    OTHER SURGICAL HISTORY  06/10/2013    plication for treatment of pyronie's disease    VASECTOMY       Family History   Problem Relation Age of Onset    Diabetes Mother     Diabetes Father     Neuropathy Father      Social History     Socioeconomic History    Marital status:     Number of children: 2   Tobacco Use    Smoking status: Former     Packs/day: 1.00     Years: 37.00     Additional pack years: 0.00     Total pack years: 37.00     Types: Cigarettes     Start date: 3/11/1986     Quit date: 2023     Years since quittin.6     Passive exposure: Past    Smokeless tobacco: Never   Vaping Use    Vaping Use: Never used   Substance and Sexual Activity    Alcohol use: Yes     Alcohol/week: 1.0 standard drink of alcohol     Types: 1 Cans of beer per week     Comment: daily    Drug use: Never    Sexual activity: Not Currently     Partners: Female     Birth control/protection: Condom, Pill     No Known Allergies    Current Outpatient  "Medications:     atorvastatin (LIPITOR) 80 MG tablet, Take 1 tablet by mouth Daily., Disp: 90 tablet, Rfl: 0    carvedilol (COREG) 25 MG tablet, TAKE 1 TABLET BY MOUTH TWICE DAILY, Disp: 180 tablet, Rfl: 0    clopidogrel (PLAVIX) 75 MG tablet, TAKE 1 TABLET BY MOUTH DAILY, Disp: 90 tablet, Rfl: 0    Continuous Blood Gluc Sensor (FreeStyle Francis 2 Sensor) [facility] [place] SENSOR EVERY 14 DAYS, Disp: 2 each, Rfl: 11    Glucagon (Baqsimi Two Pack) 3 MG/DOSE powder, 3 mg into the nostril(s) as directed by provider As Needed (Severe hypoglycemia)., Disp: 1 each, Rfl: 2    Insulin Aspart (NovoLOG) 100 UNIT/ML injection, Up to 100 per day per insulin pump, Disp: 30 mL, Rfl: 3    insulin patient supplied pump, Inject  under the skin into the appropriate area as directed Continuous. Type of Insulin: NOVOLOG Basal Dose:  12 AM to 6 AM - 2.15u/hr 6 AM to 3 PM - 1.9u/hr 3 PM to 12 AM - 2.115u/hr  Bolus Dose: CORRECTION RATIO 1:40 Prescriber:KATHERINE ROWLAND Phone number: Next refill: 4/14/23 MEDTRONIC PUMP, Disp: , Rfl:     sacubitril-valsartan (ENTRESTO)  MG tablet, Take 1 tablet by mouth 2 (Two) Times a Day., Disp: 180 tablet, Rfl: 3    spironolactone (ALDACTONE) 25 MG tablet, Take 1 tablet by mouth Daily., Disp: 90 tablet, Rfl: 3    Objective     Vitals:    11/14/23 1552   BP: 115/72   BP Location: Right arm   Patient Position: Sitting   Cuff Size: Adult   Pulse: 80   SpO2: 100%   Weight: 80.7 kg (178 lb)   Height: 170.2 cm (67\")   PainSc: 0-No pain     Body mass index is 27.88 kg/m².    Physical Exam  Constitutional:       Appearance: Normal appearance.      Comments: Overweight (BMI 25 - 29.9) Pt Current BMI = 27.88    HENT:      Head: Normocephalic and atraumatic.      Right Ear: External ear normal.      Left Ear: External ear normal.      Nose: Nose normal.   Eyes:      Extraocular Movements: Extraocular movements intact.      Conjunctiva/sclera: Conjunctivae normal.   Pulmonary:      Effort: Pulmonary effort is normal. " "  Musculoskeletal:         General: Normal range of motion.      Cervical back: Normal range of motion.   Skin:     General: Skin is warm and dry.   Neurological:      General: No focal deficit present.      Mental Status: He is alert and oriented to person, place, and time. Mental status is at baseline.   Psychiatric:         Mood and Affect: Mood normal.         Behavior: Behavior normal.         Thought Content: Thought content normal.         Judgment: Judgment normal.         {DIABETIC FOOT EXAM FOR  (Optional):64173::\"Diabetic Foot Exam Performed\":0}    Result Review :{Labs  Result Review  Imaging  Med Tab  Media :23}   The following data was reviewed by: ZEINAB Goel on 11/14/2023:    Most Recent A1C          11/14/2023    15:59   HGBA1C Most Recent   Hemoglobin A1C 8.6        A1C Last 3 Results          6/12/2023    13:50 8/14/2023    16:44 11/14/2023    15:59   HGBA1C Last 3 Results   Hemoglobin A1C 9.3  8.8  8.6      A1c collected in the office today is 8.6%, indicating Uncontrolled Type I diabetes.  This result is down from the prior result of 8.8% collected on 8/14/23     Glucose   Date Value Ref Range Status   11/14/2023 109 (H) 70 - 99 mg/dL Final     Comment:     Serial Number: [id number]:  [id number]     Point of care glucose in the office today is within normal limits for nonfasting glucose         {CC Problem List  Visit Diagnosis  ROS  Review (Popup)  Health Maintenance  Quality  BestPractice  Medications  SmartSets  SnapShot Encounters  Media :23}     Assessment: ***tearful, eating junk food      Diagnoses and all orders for this visit:    1. Uncontrolled type 1 diabetes mellitus with hyperglycemia (Primary)  -     POC Glycosylated Hemoglobin (Hb A1C)    Other orders  -     POC Glucose        Plan: ***focus on entering carbs    The patient will monitor his blood glucose levels ***.  If he develops problematic hyperglycemia or hypoglycemia or adverse drug " reactions, he will contact the office for further instructions.    {Time Spent (Optional):18071}    Follow Up {Instructions Charge Capture  Follow-up Communications :23}    No follow-ups on file.    Patient was given instructions and counseling regarding his condition or for health maintenance advice. Please see specific information pulled into the AVS if appropriate.     Jennifer Delgado, APRN  11/14/2023      Dictated Utilizing Dragon Dictation.  Please note that portions of this note were completed with a voice recognition program.  Part of this note may be an electronic transcription/translation of spoken language to printed text using the Dragon Dictation System.

## 2023-11-14 ENCOUNTER — OFFICE VISIT (OUTPATIENT)
Dept: DIABETES SERVICES | Facility: HOSPITAL | Age: 53
End: 2023-11-14
Payer: COMMERCIAL

## 2023-11-14 VITALS
HEIGHT: 67 IN | BODY MASS INDEX: 27.94 KG/M2 | SYSTOLIC BLOOD PRESSURE: 115 MMHG | DIASTOLIC BLOOD PRESSURE: 72 MMHG | OXYGEN SATURATION: 100 % | WEIGHT: 178 LBS | HEART RATE: 80 BPM

## 2023-11-14 DIAGNOSIS — Z97.8 USES SELF-APPLIED CONTINUOUS GLUCOSE MONITORING DEVICE: ICD-10-CM

## 2023-11-14 DIAGNOSIS — E66.3 OVERWEIGHT (BMI 25.0-29.9): ICD-10-CM

## 2023-11-14 DIAGNOSIS — E10.40 TYPE 1 DIABETES MELLITUS WITH DIABETIC NEUROPATHY: ICD-10-CM

## 2023-11-14 DIAGNOSIS — E10.65 UNCONTROLLED TYPE 1 DIABETES MELLITUS WITH HYPERGLYCEMIA: Primary | ICD-10-CM

## 2023-11-14 DIAGNOSIS — Z96.41 INSULIN PUMP IN PLACE: ICD-10-CM

## 2023-11-14 LAB
EXPIRATION DATE: ABNORMAL
GLUCOSE BLDC GLUCOMTR-MCNC: 109 MG/DL (ref 70–99)
HBA1C MFR BLD: 8.6 % (ref 4.5–5.7)
Lab: ABNORMAL

## 2023-11-14 PROCEDURE — 82948 REAGENT STRIP/BLOOD GLUCOSE: CPT | Performed by: NURSE PRACTITIONER

## 2023-11-14 PROCEDURE — G0463 HOSPITAL OUTPT CLINIC VISIT: HCPCS | Performed by: NURSE PRACTITIONER

## 2023-11-14 RX ORDER — INSULIN ASPART 100 [IU]/ML
INJECTION, SOLUTION INTRAVENOUS; SUBCUTANEOUS
Qty: 30 ML | Refills: 3 | Status: SHIPPED | OUTPATIENT
Start: 2023-11-14

## 2023-11-18 NOTE — ASSESSMENT & PLAN NOTE
He underwent extensive work-up recently.  So far loop recorder did not show any evidence of atrial arrhythmias.  Echocardiogram showed normal LV function with no intracardiac thrombus.  Hypercoagulable work-up was normal except for heterozygous factor V Leiden mutation.  After discussion with multiple specialist, hematologist advised to continue with aspirin and Plavix.  He is currently on Plavix monotherapy.  Statins will be continued.  He already quit smoking.

## 2023-11-18 NOTE — ASSESSMENT & PLAN NOTE
He is clinically not volume overloaded.  He has NYHA class II symptoms at this time.  Echocardiogram done in April showed ejection fraction 50%, which is an improvement from before.  He is on maximal dose of carvedilol and Entresto which will be continued along with spironolactone.  No current indication for loop diuretics.  He already has an ICD in place.  We will do complete metabolic panel now.

## 2023-11-18 NOTE — ASSESSMENT & PLAN NOTE
Normal device function per home remote interrogation.  He also has a loop recorder implanted from [facility] for stroke work-up.  So far, he did not show any evidence of atrial arrhythmias.  We will continue to monitor both.

## 2023-11-18 NOTE — PROGRESS NOTES
Labs done recently at [place] reviewed.  Cholesterol levels are at goal.  LDL is 60, significantly came down from previous levels.  Hemoglobin, blood counts, liver enzymes, kidney functions, sodium and potassium are also within normal limits.    Recommend to continue current dose of atorvastatin, along with Plavix.      Electronically signed by Tommy Nagel MD, 11/18/23, 5:39 PM EST.

## 2023-11-18 NOTE — ASSESSMENT & PLAN NOTE
Blood pressure is very well controlled.  Continue carvedilol, Entresto and spironolactone at the current dose.

## 2023-11-18 NOTE — ASSESSMENT & PLAN NOTE
His LDL was 84 per labs done in March.  He has diabetes, nonobstructive CAD and recurrent strokes.  Goal LDL is under 70.  We will repeat lipid panel now.  Continue atorvastatin 80 mg daily for now.

## 2023-11-27 ENCOUNTER — OFFICE VISIT (OUTPATIENT)
Dept: NEUROLOGY | Facility: CLINIC | Age: 53
End: 2023-11-27
Payer: COMMERCIAL

## 2023-11-27 VITALS
HEART RATE: 85 BPM | SYSTOLIC BLOOD PRESSURE: 107 MMHG | WEIGHT: 176 LBS | BODY MASS INDEX: 27.62 KG/M2 | HEIGHT: 67 IN | DIASTOLIC BLOOD PRESSURE: 75 MMHG

## 2023-11-27 DIAGNOSIS — M75.90 BURSITIS AND TENDINITIS OF SHOULDER REGION: Primary | ICD-10-CM

## 2023-11-27 DIAGNOSIS — I69.30 SEQUELAE, POST-STROKE: ICD-10-CM

## 2023-11-27 DIAGNOSIS — M75.50 BURSITIS AND TENDINITIS OF SHOULDER REGION: Primary | ICD-10-CM

## 2023-11-27 PROCEDURE — 99214 OFFICE O/P EST MOD 30 MIN: CPT | Performed by: PSYCHIATRY & NEUROLOGY

## 2023-11-27 NOTE — ASSESSMENT & PLAN NOTE
I discussed with him that he has bursitis of the right shoulder and is not related to his stroke since did not have significant weakness of his right arm that limited his range of motion or mobility of his right arm.  I will send him to orthopedic surgery.  Thank you for letting me participate in his care.

## 2023-11-27 NOTE — PROGRESS NOTES
"Chief Complaint  Follow-up (Check up)    Subjective          Demetrio Mariano is a 53 y.o. male who presents to [facility]  History of Present Illness  53-year-old man here for follow-up of his stroke.  Has not had any recurrent stroke.  His right shoulder hurts and tight.  His wife is with him today.  He has not got his disability.  He continues have slurring of speech.  He had multiple strokes in the past.  We talked about the mechanism of the stroke which is small vessel disease.  I discussed with them that he does not need a GERMAN at this time.  He is also taking ibuprofen on a daily basis because of his foot pain.  I discussed with him and his wife that ibuprofen is not to be taken that way since it has a black box warning of increasing risk for stroke and heart attacks and it also can cause kidney damage.  He is seeing cardiology for congestive heart failure and defibrillator.  Objective   Vital Signs:   /75   Pulse 85   Ht 170.2 cm (67.01\")   Wt 79.8 kg (176 lb)   BMI 27.56 kg/m²     Physical Exam   He is alert, fluent, phasic, follows commands well.  There is limited range of motion the right shoulder reaching his back as well as external rotation and lifting at above his head.  Heart is regular and rhythm normal in rate.        Assessment and Plan  Diagnoses and all orders for this visit:    1. Bursitis and tendinitis of shoulder region (Primary)  Assessment & Plan:  I discussed with him that he has bursitis of the right shoulder and is not related to his stroke since did not have significant weakness of his right arm that limited his range of motion or mobility of his right arm.  I will send him to orthopedic surgery.  Thank you for letting me participate in his care.    Orders:  -     Ambulatory Referral to Orthopedic Surgery    2. Sequelae, post-stroke  Assessment & Plan:  He is to continue taking Plavix.  He will have it prescribed to him for the " next year then have his primary care provider prescribed to him.  I discussed with him that he does not need GERMAN.  Should have another recurrent stroke in the future it most likely is again small vessel disease but he can have a GERMAN at that time for sake of deciding whether it he will benefit from anticoagulation versus continue antiplatelet agents.           Total time spent with the patient and coordinating patient care was 30 minutes.    Follow Up  No follow-ups on file.  Patient was given instructions and counseling regarding his condition or for health maintenance advice. Please see specific information pulled into the AVS if appropriate.

## 2023-11-27 NOTE — ASSESSMENT & PLAN NOTE
He is to continue taking Plavix.  He will have it prescribed to him for the next year then have his primary care provider prescribed to him.  I discussed with him that he does not need GERMAN.  Should have another recurrent stroke in the future it most likely is again small vessel disease but he can have a GERMAN at that time for sake of deciding whether it he will benefit from anticoagulation versus continue antiplatelet agents.

## 2023-11-30 DIAGNOSIS — I42.8 NON-ISCHEMIC CARDIOMYOPATHY: Chronic | ICD-10-CM

## 2023-11-30 DIAGNOSIS — I10 ESSENTIAL HYPERTENSION: ICD-10-CM

## 2023-11-30 RX ORDER — ATORVASTATIN CALCIUM 80 MG/1
80 TABLET, FILM COATED ORAL DAILY
Qty: 90 TABLET | Refills: 0 | Status: SHIPPED | OUTPATIENT
Start: 2023-11-30

## 2023-11-30 RX ORDER — CARVEDILOL 25 MG/1
TABLET ORAL
Qty: 180 TABLET | Refills: 0 | Status: SHIPPED | OUTPATIENT
Start: 2023-11-30

## 2023-12-08 ENCOUNTER — OFFICE VISIT (OUTPATIENT)
Dept: ORTHOPEDIC SURGERY | Facility: CLINIC | Age: 53
End: 2023-12-08
Payer: COMMERCIAL

## 2023-12-08 VITALS
OXYGEN SATURATION: 96 % | DIASTOLIC BLOOD PRESSURE: 71 MMHG | BODY MASS INDEX: 27.62 KG/M2 | HEIGHT: 67 IN | HEART RATE: 85 BPM | SYSTOLIC BLOOD PRESSURE: 91 MMHG | WEIGHT: 176 LBS

## 2023-12-08 DIAGNOSIS — M75.01 ADHESIVE CAPSULITIS OF RIGHT SHOULDER: ICD-10-CM

## 2023-12-08 DIAGNOSIS — M25.511 RIGHT SHOULDER PAIN, UNSPECIFIED CHRONICITY: Primary | ICD-10-CM

## 2023-12-08 RX ORDER — LIDOCAINE HYDROCHLORIDE 10 MG/ML
5 INJECTION, SOLUTION INFILTRATION; PERINEURAL
Status: COMPLETED | OUTPATIENT
Start: 2023-12-08 | End: 2023-12-08

## 2023-12-08 RX ORDER — TRIAMCINOLONE ACETONIDE 40 MG/ML
40 INJECTION, SUSPENSION INTRA-ARTICULAR; INTRAMUSCULAR
Status: COMPLETED | OUTPATIENT
Start: 2023-12-08 | End: 2023-12-08

## 2023-12-08 RX ADMIN — TRIAMCINOLONE ACETONIDE 40 MG: 40 INJECTION, SUSPENSION INTRA-ARTICULAR; INTRAMUSCULAR at 14:13

## 2023-12-08 RX ADMIN — LIDOCAINE HYDROCHLORIDE 5 ML: 10 INJECTION, SOLUTION INFILTRATION; PERINEURAL at 14:13

## 2023-12-08 NOTE — PROGRESS NOTES
"Chief Complaint  Initial Evaluation of the Right Shoulder     Subjective      Demetrio Mariano presents to [facility] for initial evaluation of the right shoulder. He has pain in the shoulder and down the arm.  He has had pain for awhile and he has had 3 strokes and the right side was affected for the last stroke. He uses pulleys and ibuprofen for ROM of the right shoulder.  He is diabetic.      No Known Allergies     Social History     Socioeconomic History    Marital status:     Number of children: 2   Tobacco Use    Smoking status: Former     Packs/day: 1.00     Years: 37.00     Additional pack years: 0.00     Total pack years: 37.00     Types: Cigarettes     Start date: 3/11/1986     Quit date: 2023     Years since quittin.6     Passive exposure: Past    Smokeless tobacco: Never   Vaping Use    Vaping Use: Never used   Substance and Sexual Activity    Alcohol use: Yes     Alcohol/week: 1.0 standard drink of alcohol     Types: 1 Cans of beer per week     Comment: daily    Drug use: Never    Sexual activity: Not Currently     Partners: Female     Birth control/protection: Condom, Pill        I reviewed the patient's chief complaint, history of present illness, review of systems, past medical history, surgical history, family history, social history, medications, and allergy list.     Review of Systems     Constitutional: Denies fevers, chills, weight loss  Cardiovascular: Denies chest pain, shortness of breath  Skin: Denies rashes, acute skin changes  Neurologic: Denies headache, loss of consciousness        Vital Signs:   BP 91/71   Pulse 85   Ht 170.2 cm (67\")   Wt 79.8 kg (176 lb)   SpO2 96%   BMI 27.57 kg/m²          Physical Exam  General: Alert. No acute distress    Ortho Exam        RIGHT SHOULDER Forward flexion 90 AROM 120 AAROM. Abduction 90. External rotation 35. Internal rotation SI joint. Negative Cross body adduction. Supraspinatus strength 5/5. " Infraspinatus Strength 5/5. Infrared subscap 5/5. Positive Lockwood. Positive Neer. Negative Apprehension. Negative Lift off. (Negative Obriens. Sensation intact to light touch, median, radial, ulnar nerve. Positive AIN, PIN, ulnar nerve motor. Positive pulses. Positive Impingement signs. Good strength in triceps, biceps, deltoid, wrist extensors and wrist flexors. Tender to palpation to the shoulder and down the arm.  Positive Speeds test.        Large Joint Arthrocentesis: R subacromial bursa  Date/Time: 12/8/2023 2:13 PM  Consent given by: patient  Site marked: site marked  Timeout: Immediately prior to procedure a time out was called to verify the correct patient, procedure, equipment, support staff and site/side marked as required   Supporting Documentation  Indications: pain   Procedure Details  Location: shoulder - R subacromial bursa  Preparation: Patient was prepped and draped in the usual sterile fashion  Needle size: 22 G  Medications administered: 5 mL lidocaine 1 %; 40 mg triamcinolone acetonide 40 MG/ML  Patient tolerance: patient tolerated the procedure well with no immediate complications          Imaging Results (Most Recent)       Procedure Component Value Units Date/Time    XR Scapula Right [[id number]] Resulted: 12/08/23 1345     Updated: 12/08/23 1350             Result Review :     X-Ray Report:  Right scapula X-Ray  Indication: Evaluation of the right scapula  AP/Lateral view(s)  Findings: AC joint arthritis.   Prior studies available for comparison: no             Assessment and Plan     Diagnoses and all orders for this visit:    1. Right shoulder pain, unspecified chronicity (Primary)  -     XR Scapula Right    2. Adhesive capsulitis of right shoulder        Discussed the treatment plan with the patient. I reviewed the X-rays that were obtained today with the patient.     Discussed the risks and benefits of conservative measures. The patient expressed understanding and wished to proceed with  a right shoulder steroid injection.  He tolerated the injection well.     Prescribed physical therapy.     Call or return if worsening symptoms.    Follow Up     4-6 weeks to assess ROM.        Patient was given instructions and counseling regarding his condition or for health maintenance advice. Please see specific information pulled into the AVS if appropriate.     Scribed for Remi Christensen MD by Maritza Vital MA.  12/08/23   13:45 EST      I have personally performed the services described in this document as scribed by the above individual and it is both accurate and complete. Remi Christensen MD 12/08/23

## 2024-01-16 ENCOUNTER — EDUCATION (OUTPATIENT)
Dept: DIABETES SERVICES | Facility: HOSPITAL | Age: 54
End: 2024-01-16
Payer: COMMERCIAL

## 2024-01-16 DIAGNOSIS — E10.9 DIABETES MELLITUS TYPE 1, CONTROLLED, WITHOUT COMPLICATIONS: Primary | ICD-10-CM

## 2024-01-16 NOTE — PROGRESS NOTES
Prakashoctavio Mariano 53 y.o. presents for Medtronic insulin pump instructions. Insulin pump training check list completed.  Patient stated they have viewed some of the insulin pump videos.  Patient was able to maneuver pump screens without difficulty.  Patient applied insulin pump insertion site without difficulty 15-15 rule for treatment of low blood glucose was reviewed with patient. Patient was explained to always have back up supplies including blood glucose meter, strips, lancets, basal insulin, short acting insulin pens or vials with insulin syringes and emergency glucagon. Patient was given DSME staff contact information and encouraged patient to contact staff with questions or concerns.    Insulin pump settings are as follows:  Basal rates 12 AM to 3 PM 1.95 units/h, 3 PM to 12 midnight 2.15 units/h  Carb ratio 12 AM to 12 AM 1 unit per 12 carbs, insulin sensitivity 12 AM to 12 AM 1 unit per 40 points above target, target blood glucose from 12 AM to 12 AM is 100  Maximum bolus 25 units, maximum basal rate 3.5 units/h and active insulin time 2 hours        Time started: 9: 15    Time ended: 12: 15    Adenike Pena, RNC-AWHC, MLDE, [facility]  01/16/2024

## 2024-01-22 ENCOUNTER — EDUCATION (OUTPATIENT)
Dept: DIABETES SERVICES | Facility: HOSPITAL | Age: 54
End: 2024-01-22
Payer: COMMERCIAL

## 2024-01-22 DIAGNOSIS — E10.9 DIABETES MELLITUS TYPE 1, CONTROLLED, WITHOUT COMPLICATIONS: Primary | ICD-10-CM

## 2024-01-22 NOTE — PROGRESS NOTES
Patient presented for smartguard feature on insulin pump.  SmartGuard check list completed.  High and low insulin pump and continuous glucose monitor settings double checked.  Active insulin changed to 2 hours.  Blood glucose target 100.  Patient was encouraged to contact DSME staff with any questions or concerns.  D DSME staff contact information given to patient.  Patient does have future appointment with Jennifer ARRIOLA

## 2024-01-30 ENCOUNTER — TELEPHONE (OUTPATIENT)
Dept: DIABETES SERVICES | Facility: HOSPITAL | Age: 54
End: 2024-01-30
Payer: COMMERCIAL

## 2024-02-07 RX ORDER — CLOPIDOGREL BISULFATE 75 MG/1
75 TABLET ORAL DAILY
Qty: 90 TABLET | Refills: 3 | Status: SHIPPED | OUTPATIENT
Start: 2024-02-07

## 2024-02-19 NOTE — PROGRESS NOTES
Chief Complaint  Diabetes (Follow up, medtronic )    Referred By: ZEINAB Onofre presents to [facility] for diabetes medication management    History of Present Illness    Visit type:  follow-up  Diabetes type:  Type 1  Current diabetes status/concerns/issues:  No concerns with his diabetes today.  He was upgraded to the Medtronic 780 insulin pump with continuous glucose sensor on 1/16/2024.  This is his initial evaluation after the upgrade to the pump.  Other health concerns:  He is able to drive on his own again  Current Diabetes symptoms:    Polyuria: No   Polydipsia: No   Polyphagia: No   Blurred vision: No   Excessive fatigue: No  Known Diabetes complications:  Neuropathy: Pain; Location: Feet and Legs  Renal: None  Eyes: No current eye exam available in record and Other: he reports having diabetic retinopathy  Amputation/Wounds: None  GI: None  Cardiovascular: CAD, CHF, Cardiomyopathy, and CVA (History of)  ED: Patient Reported  Other: None  Hypoglycemia:  None reported at this time  Hypoglycemia Symptoms:  patient has hypoglycemia unawareness  Current diabetes treatment:  Medtronic pump currently using NovoLog insulin.  He is using 30 g for a small meal, 45 g for medium size meal and 60 g for large meal when entering carbohydrates into the pump.     Blood glucose device:  Factabase CGM  Blood glucose monitoring frequency:  Continuous per CGM  Blood glucose range/average:  The 14-day sensor report shows an average glucose of 146 mg/dL, with 82% in target range ( mgdL), 19% in the high range (181-250 mg/dL), 2% in the very high range (>250 mg/dL), 0% in the low range (54-70 mg/dL) and 0% in the very low range (<54 mg/dL).   Glucose Source: Device Reviewed  Diet:  Carbohydrate Counting, Limits high carb/sweet foods, Avoids sugary drinks  Activity/Exercise:  None    Past Medical History:   Diagnosis Date    CHF (congestive  heart failure)     Coronary artery disease involving native heart without angina pectoris      Nonobstructive coronary artery disease involving diagonal branch per cardiac catheterization done on 2021    Essential hypertension, benign     Hypercholesterolemia     ICD (implantable cardioverter-defibrillator), dual, in situ 2021    Nicotine dependence     Non-ischemic cardiomyopathy (CMS/HCC) 2021    Mildly dilated left ventricular cavity with severe global hypokinesis with estimated LV ejection fraction of       28%. 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     Type I diabetes mellitus, uncontrolled      Past Surgical History:   Procedure Laterality Date    CARDIAC CATHETERIZATION      CARDIAC ELECTROPHYSIOLOGY PROCEDURE N/A 06/15/2021    Procedure: ICD SC new;  Surgeon: Regulo Coyne MD;  Location: [facility] INVASIVE LOCATION;  Service: Cardiovascular;  Laterality: N/A;    FRACTURE SURGERY Right     crushed heel injury with 14 fractures    OTHER SURGICAL HISTORY  06/10/2013    plication for treatment of pyronie's disease    VASECTOMY       Family History   Problem Relation Age of Onset    Diabetes Mother     Diabetes Father     Neuropathy Father      Social History     Socioeconomic History    Marital status:     Number of children: 2   Tobacco Use    Smoking status: Former     Packs/day: 1.00     Years: 37.00     Additional pack years: 0.00     Total pack years: 37.00     Types: Cigarettes     Start date: 3/11/1986     Quit date: 2023     Years since quittin.8     Passive exposure: Past    Smokeless tobacco: Never   Vaping Use    Vaping Use: Never used   Substance and Sexual Activity    Alcohol use: Yes     Alcohol/week: 1.0 standard drink of alcohol     Types: 1 Cans of beer per week     Comment: daily    Drug use: Never    Sexual activity: Not Currently     Partners: Female     Birth  "control/protection: Condom, Pill     No Known Allergies    Current Outpatient Medications:     atorvastatin (LIPITOR) 80 MG tablet, TAKE 1 TABLET BY MOUTH DAILY, Disp: 90 tablet, Rfl: 0    carvedilol (COREG) 25 MG tablet, TAKE 1 TABLET BY MOUTH TWICE DAILY, Disp: 180 tablet, Rfl: 0    clopidogrel (PLAVIX) 75 MG tablet, TAKE 1 TABLET BY MOUTH DAILY, Disp: 90 tablet, Rfl: 3    Glucagon (Baqsimi Two Pack) 3 MG/DOSE powder, 3 mg into the nostril(s) as directed by provider As Needed (Severe hypoglycemia)., Disp: 1 each, Rfl: 2    Insulin Aspart (NovoLOG) 100 UNIT/ML injection, Up to 100 per day per insulin pump, Disp: 30 mL, Rfl: 5    insulin patient supplied pump, Inject  under the skin into the appropriate area as directed Continuous. Type of Insulin: NOVOLOG Basal Dose:  12 AM to 6 AM - 2.15u/hr 6 AM to 3 PM - 1.9u/hr 3 PM to 12 AM - 2.115u/hr  Bolus Dose: CORRECTION RATIO 1:40 Prescriber:KATHERINE ROWLAND Phone number: Next refill: 4/14/23 MEDTRONIC PUMP, Disp: , Rfl:     sacubitril-valsartan (ENTRESTO)  MG tablet, Take 1 tablet by mouth 2 (Two) Times a Day., Disp: 180 tablet, Rfl: 3    spironolactone (ALDACTONE) 25 MG tablet, Take 1 tablet by mouth Daily., Disp: 90 tablet, Rfl: 3    Objective     Vitals:    02/21/24 1043   BP: 100/52   BP Location: Right arm   Patient Position: Sitting   Cuff Size: Adult   Pulse: 83   SpO2: 98%   Weight: 79.3 kg (174 lb 12.8 oz)   Height: 170.2 cm (67\")     Body mass index is 27.38 kg/m².    Physical Exam  Constitutional:       Appearance: Normal appearance. He is obese.      Comments: Overweight (BMI 25 - 29.9) Pt Current BMI = 27.38    HENT:      Head: Normocephalic and atraumatic.      Right Ear: External ear normal.      Left Ear: External ear normal.      Nose: Nose normal.   Eyes:      Extraocular Movements: Extraocular movements intact.      Conjunctiva/sclera: Conjunctivae normal.   Pulmonary:      Effort: Pulmonary effort is normal.   Musculoskeletal:         General: " Normal range of motion.      Cervical back: Normal range of motion.   Skin:     General: Skin is warm and dry.   Neurological:      General: No focal deficit present.      Mental Status: He is alert and oriented to person, place, and time. Mental status is at baseline.   Psychiatric:         Mood and Affect: Mood normal.         Behavior: Behavior normal.         Thought Content: Thought content normal.         Judgment: Judgment normal.             Result Review :   The following data was reviewed by: ZEINAB Goel on 02/21/2024:    Most Recent A1C          2/21/2024    10:52   HGBA1C Most Recent   Hemoglobin A1C 8.1        A1C Last 3 Results          8/14/2023    16:44 11/14/2023    15:59 2/21/2024    10:52   HGBA1C Last 3 Results   Hemoglobin A1C 8.8  8.6  8.1      A1c collected in the office today is 8.1%, indicating Uncontrolled Type II diabetes.  This result is down from the prior result of 8.6% collected on 11/14/23     Glucose   Date Value Ref Range Status   02/21/2024 138 (H) 70 - 99 mg/dL Final     Comment:     Serial Number: [id number]:  [id number]     Point of care glucose in the office today is within normal limits for nonfasting glucose              Assessment: The patient has had improvement in his A1c but remains above target.  The pump report shows CGM data with significant improvement in an glucose average and time in range.  The A1c should improve over time based on today's data.      Diagnoses and all orders for this visit:    1. Uncontrolled type 1 diabetes mellitus with hyperglycemia (Primary)  -     POC Glucose  -     POC Glycosylated Hemoglobin (Hb A1C)  -     Insulin Aspart (NovoLOG) 100 UNIT/ML injection; Up to 100 per day per insulin pump  Dispense: 30 mL; Refill: 5    2. Type 1 diabetes mellitus with diabetic neuropathy    3. Insulin pump in place    4. Uses self-applied continuous glucose monitoring device        Plan: No changes were made to the pump settings today.   The patient is encouraged to continue interacting with the pump routinely with putting his carbohydrates in in a timely manner.  He will be scheduled for routine follow-up appointment.    The patient will monitor his blood glucose levels using the CGM.  If he develops problematic hyperglycemia or hypoglycemia or adverse drug reactions, he will contact the office for further instructions.        Follow Up     Return in about 3 months (around 5/21/2024) for Pump Eval, CGM Follow-up.    Patient was given instructions and counseling regarding his condition or for health maintenance advice. Please see specific information pulled into the AVS if appropriate.     Jennifer Delgado, ZEINAB  02/21/2024      Dictated Utilizing Dragon Dictation.  Please note that portions of this note were completed with a voice recognition program.  Part of this note may be an electronic transcription/translation of spoken language to printed text using the Dragon Dictation System.

## 2024-02-21 ENCOUNTER — OFFICE VISIT (OUTPATIENT)
Dept: DIABETES SERVICES | Facility: HOSPITAL | Age: 54
End: 2024-02-21
Payer: COMMERCIAL

## 2024-02-21 VITALS
WEIGHT: 174.8 LBS | HEART RATE: 83 BPM | HEIGHT: 67 IN | BODY MASS INDEX: 27.44 KG/M2 | OXYGEN SATURATION: 98 % | SYSTOLIC BLOOD PRESSURE: 100 MMHG | DIASTOLIC BLOOD PRESSURE: 52 MMHG

## 2024-02-21 DIAGNOSIS — E10.40 TYPE 1 DIABETES MELLITUS WITH DIABETIC NEUROPATHY: ICD-10-CM

## 2024-02-21 DIAGNOSIS — E10.9 DIABETES MELLITUS TYPE 1, CONTROLLED, WITHOUT COMPLICATIONS: ICD-10-CM

## 2024-02-21 DIAGNOSIS — Z96.41 INSULIN PUMP IN PLACE: ICD-10-CM

## 2024-02-21 DIAGNOSIS — Z97.8 USES SELF-APPLIED CONTINUOUS GLUCOSE MONITORING DEVICE: ICD-10-CM

## 2024-02-21 DIAGNOSIS — E10.65 UNCONTROLLED TYPE 1 DIABETES MELLITUS WITH HYPERGLYCEMIA: Primary | ICD-10-CM

## 2024-02-21 LAB
EXPIRATION DATE: ABNORMAL
GLUCOSE BLDC GLUCOMTR-MCNC: 138 MG/DL (ref 70–99)
HBA1C MFR BLD: 8.1 % (ref 4.5–5.7)
Lab: ABNORMAL

## 2024-02-21 PROCEDURE — 99214 OFFICE O/P EST MOD 30 MIN: CPT | Performed by: NURSE PRACTITIONER

## 2024-02-21 PROCEDURE — 82948 REAGENT STRIP/BLOOD GLUCOSE: CPT | Performed by: NURSE PRACTITIONER

## 2024-02-21 PROCEDURE — G0463 HOSPITAL OUTPT CLINIC VISIT: HCPCS | Performed by: NURSE PRACTITIONER

## 2024-02-21 PROCEDURE — 95251 CONT GLUC MNTR ANALYSIS I&R: CPT | Performed by: NURSE PRACTITIONER

## 2024-02-21 RX ORDER — INSULIN ASPART 100 [IU]/ML
INJECTION, SOLUTION INTRAVENOUS; SUBCUTANEOUS
Qty: 30 ML | Refills: 5 | Status: SHIPPED | OUTPATIENT
Start: 2024-02-21

## 2024-03-04 DIAGNOSIS — I42.8 NON-ISCHEMIC CARDIOMYOPATHY: Chronic | ICD-10-CM

## 2024-03-04 DIAGNOSIS — I10 ESSENTIAL HYPERTENSION: ICD-10-CM

## 2024-03-04 RX ORDER — CARVEDILOL 25 MG/1
TABLET ORAL
Qty: 180 TABLET | Refills: 3 | Status: SHIPPED | OUTPATIENT
Start: 2024-03-04

## 2024-03-04 RX ORDER — ATORVASTATIN CALCIUM 80 MG/1
80 TABLET, FILM COATED ORAL DAILY
Qty: 90 TABLET | Refills: 3 | Status: SHIPPED | OUTPATIENT
Start: 2024-03-04

## 2024-03-22 ENCOUNTER — TELEPHONE (OUTPATIENT)
Dept: DIABETES SERVICES | Facility: HOSPITAL | Age: 54
End: 2024-03-22
Payer: COMMERCIAL

## 2024-03-22 NOTE — TELEPHONE ENCOUNTER
Insulin Pump Supplies Package    Medtronic Insulin Pump, MiniMed 780G (Currently In Use) - IPUMP    Infusion Set, Standard, Each (1) - INFS    Infusion Cartridges, Standard unit 300, Each (1) - INFC    Quantity  1  Prescription Details  Infusion set/cartridge change frequency - 3 days  Supplier  Medtronic [facility] Order    Diabetes Supplies Package    Diabetes Supplies Only    Blood Glucose Test Strips, Accu-Chek Guide - BLDSTRP    Quantity  1  Prescription Details  Future Self-Testing Regimen - 3 times per day  Supplier  [employer] [facility]

## 2024-05-23 ENCOUNTER — OFFICE VISIT (OUTPATIENT)
Dept: DIABETES SERVICES | Facility: HOSPITAL | Age: 54
End: 2024-05-23
Payer: COMMERCIAL

## 2024-05-23 VITALS
HEART RATE: 74 BPM | HEIGHT: 67 IN | WEIGHT: 172 LBS | BODY MASS INDEX: 27 KG/M2 | OXYGEN SATURATION: 98 % | DIASTOLIC BLOOD PRESSURE: 69 MMHG | SYSTOLIC BLOOD PRESSURE: 114 MMHG

## 2024-05-23 DIAGNOSIS — E10.40 TYPE 1 DIABETES MELLITUS WITH DIABETIC NEUROPATHY: ICD-10-CM

## 2024-05-23 DIAGNOSIS — Z97.8 USES SELF-APPLIED CONTINUOUS GLUCOSE MONITORING DEVICE: ICD-10-CM

## 2024-05-23 DIAGNOSIS — E10.65 UNCONTROLLED TYPE 1 DIABETES MELLITUS WITH HYPERGLYCEMIA: Primary | ICD-10-CM

## 2024-05-23 DIAGNOSIS — Z46.81 INSULIN PUMP TITRATION: ICD-10-CM

## 2024-05-23 DIAGNOSIS — Z96.41 INSULIN PUMP IN PLACE: ICD-10-CM

## 2024-05-23 LAB
EXPIRATION DATE: ABNORMAL
GLUCOSE BLDC GLUCOMTR-MCNC: 135 MG/DL (ref 70–99)
HBA1C MFR BLD: 7.5 % (ref 4.5–5.7)
Lab: ABNORMAL

## 2024-05-23 PROCEDURE — 99214 OFFICE O/P EST MOD 30 MIN: CPT | Performed by: NURSE PRACTITIONER

## 2024-05-23 PROCEDURE — 83036 HEMOGLOBIN GLYCOSYLATED A1C: CPT | Performed by: NURSE PRACTITIONER

## 2024-05-23 PROCEDURE — 95251 CONT GLUC MNTR ANALYSIS I&R: CPT | Performed by: NURSE PRACTITIONER

## 2024-05-23 PROCEDURE — 82948 REAGENT STRIP/BLOOD GLUCOSE: CPT | Performed by: NURSE PRACTITIONER

## 2024-05-23 PROCEDURE — G0463 HOSPITAL OUTPT CLINIC VISIT: HCPCS | Performed by: NURSE PRACTITIONER

## 2024-05-23 RX ORDER — PREGABALIN 50 MG/1
CAPSULE ORAL
COMMUNITY
Start: 2024-05-20

## 2024-05-23 NOTE — PROGRESS NOTES
Chief Complaint  Diabetes (Follow up, med mgt, A1c eval, cgm/pump eval )    Referred By: ZEINAB Onofre presents to [facility] for insulin pump management    History of Present Illness    Visit type:  follow-up  Diabetes type:  Type 1  Current diabetes status/concerns/issues:  He is struggling with the cost of his pump supplies.  His medicare insurance is not yet active   Other health concerns: No new health concerns  Current Diabetes symptoms:    Polyuria: No   Polydipsia: No   Polyphagia: No   Blurred vision: No   Excessive fatigue: No  Known Diabetes complications:  Neuropathy: Pain; Location: Feet and Legs  Renal: None  Eyes: No current eye exam available in record and Other: he reports having diabetic retinopathy  Amputation/Wounds: None  GI: None  Cardiovascular: CAD, CHF, Cardiomyopathy, and CVA (History of)  ED: Patient Reported  Other: None  Hypoglycemia:  None reported at this time  Hypoglycemia Symptoms:  No hypoglycemia at this time  Current diabetes treatment:  Medtronic pump currently using NovoLog insulin.  He is using 30 g for a small meal, 45 g for medium size meal and 60 g for large meal when entering carbohydrates into the pump.    Blood glucose device:  140Fire CGM  Blood glucose monitoring frequency:  Continuous per CGM  Blood glucose range/average:  The 14-day sensor report shows an average glucose of 152 mg/dL, with 77% in target range ( mgdL), 21% in the high range (181-250 mg/dL), 2% in the very high range (>250 mg/dL), 0% in the low range (54-70 mg/dL) and 0% in the very low range (<54 mg/dL).   Glucose Source: Device Reviewed  Diet:  Carbohydrate Counting, Limits high carb/sweet foods, Avoids sugary drinks  Activity/Exercise:  None    Past Medical History:   Diagnosis Date    CHF (congestive heart failure)     Coronary artery disease involving native heart without angina pectoris      Nonobstructive  coronary artery disease involving diagonal branch per cardiac catheterization done on 2021    Essential hypertension, benign     Hypercholesterolemia     ICD (implantable cardioverter-defibrillator), dual, in situ 2021    Nicotine dependence     Non-ischemic cardiomyopathy (CMS/HCC) 2021    Mildly dilated left ventricular cavity with severe global hypokinesis with estimated LV ejection fraction of       28%. 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     Type I diabetes mellitus, uncontrolled      Past Surgical History:   Procedure Laterality Date    CARDIAC CATHETERIZATION      CARDIAC ELECTROPHYSIOLOGY PROCEDURE N/A 06/15/2021    Procedure: ICD SC new;  Surgeon: Regulo Coyne MD;  Location: [facility] INVASIVE LOCATION;  Service: Cardiovascular;  Laterality: N/A;    FRACTURE SURGERY Right     crushed heel injury with 14 fractures    OTHER SURGICAL HISTORY  06/10/2013    plication for treatment of pyronie's disease    VASECTOMY       Family History   Problem Relation Age of Onset    Diabetes Mother     Diabetes Father     Neuropathy Father      Social History     Socioeconomic History    Marital status:     Number of children: 2   Tobacco Use    Smoking status: Former     Current packs/day: 0.00     Average packs/day: 1 pack/day for 37.1 years (37.1 ttl pk-yrs)     Types: Cigarettes     Start date: 3/11/1986     Quit date: 2023     Years since quittin.1     Passive exposure: Past    Smokeless tobacco: Never   Vaping Use    Vaping status: Never Used   Substance and Sexual Activity    Alcohol use: Yes     Alcohol/week: 1.0 standard drink of alcohol     Types: 1 Cans of beer per week     Comment: daily    Drug use: Never    Sexual activity: Not Currently     Partners: Female     Birth control/protection: Condom, Pill     No Known Allergies    Current Outpatient Medications:     atorvastatin  "(LIPITOR) 80 MG tablet, TAKE 1 TABLET BY MOUTH DAILY, Disp: 90 tablet, Rfl: 3    carvedilol (COREG) 25 MG tablet, TAKE 1 TABLET BY MOUTH TWICE DAILY, Disp: 180 tablet, Rfl: 3    clopidogrel (PLAVIX) 75 MG tablet, TAKE 1 TABLET BY MOUTH DAILY, Disp: 90 tablet, Rfl: 3    Glucagon (Baqsimi Two Pack) 3 MG/DOSE powder, 3 mg into the nostril(s) as directed by provider As Needed (Severe hypoglycemia)., Disp: 1 each, Rfl: 2    Insulin Aspart (NovoLOG) 100 UNIT/ML injection, Up to 100 per day per insulin pump, Disp: 30 mL, Rfl: 5    insulin patient supplied pump, Inject  under the skin into the appropriate area as directed Continuous. Type of Insulin: NOVOLOG Basal Dose:  12 AM to 6 AM - 2.15u/hr 6 AM to 3 PM - 1.9u/hr 3 PM to 12 AM - 2.115u/hr  Bolus Dose: CORRECTION RATIO 1:40 Prescriber:KATHERINE ROWLAND Phone number: Next refill: 4/14/23 MEDTRONIC PUMP, Disp: , Rfl:     pregabalin (LYRICA) 50 MG capsule, , Disp: , Rfl:     sacubitril-valsartan (ENTRESTO)  MG tablet, Take 1 tablet by mouth 2 (Two) Times a Day., Disp: 180 tablet, Rfl: 3    spironolactone (ALDACTONE) 25 MG tablet, Take 1 tablet by mouth Daily., Disp: 90 tablet, Rfl: 3    Objective     Vitals:    05/23/24 0925   BP: 114/69   Pulse: 74   SpO2: 98%   Weight: 78 kg (172 lb)   Height: 170.2 cm (67\")   PainSc: 0-No pain     Body mass index is 26.94 kg/m².    Physical Exam  Constitutional:       Appearance: Normal appearance.      Comments: Overweight (BMI 25 - 29.9) Pt Current BMI = 26.94    HENT:      Head: Normocephalic and atraumatic.      Right Ear: External ear normal.      Left Ear: External ear normal.      Nose: Nose normal.   Eyes:      Extraocular Movements: Extraocular movements intact.      Conjunctiva/sclera: Conjunctivae normal.   Pulmonary:      Effort: Pulmonary effort is normal.   Musculoskeletal:         General: Normal range of motion.      Cervical back: Normal range of motion.   Skin:     General: Skin is warm and dry.   Neurological:      " General: No focal deficit present.      Mental Status: He is alert and oriented to person, place, and time. Mental status is at baseline.   Psychiatric:         Mood and Affect: Mood normal.         Behavior: Behavior normal.         Thought Content: Thought content normal.         Judgment: Judgment normal.             Result Review :   The following data was reviewed by: ZEINAB Goel on 05/23/2024:    Most Recent A1C          5/23/2024    09:33   HGBA1C Most Recent   Hemoglobin A1C 7.5        A1C Last 3 Results          11/14/2023    15:59 2/21/2024    10:52 5/23/2024    09:33   HGBA1C Last 3 Results   Hemoglobin A1C 8.6  8.1  7.5      A1c collected in the office today is 7.5%, indicating Uncontrolled Type I diabetes.  This result is down from the prior result of 8.1% collected on 2/21/24     Glucose   Date Value Ref Range Status   05/23/2024 135 (H) 70 - 99 mg/dL Final     Comment:     Serial Number: [id number]:  [id number]     Point of care glucose in the office today is within normal limits for nonfasting glucose              Assessment: He has had improvement in his A1c but does remain above target.  He is having some postprandial hyperglycemia.  He is entering his carbohydrates send but glucose levels are still staying elevated in the midday and evening hours.  He does have some concern because his Medicare insurance has not yet kicked in and the cost of his supplies are pretty high right now.      Diagnoses and all orders for this visit:    1. Uncontrolled type 1 diabetes mellitus with hyperglycemia (Primary)  -     POC Glycosylated Hemoglobin (Hb A1C)    2. Type 1 diabetes mellitus with diabetic neuropathy    3. Insulin pump in place    4. Insulin pump titration    5. Uses self-applied continuous glucose monitoring device    Other orders  -     POC Glucose        Plan: To help with the postprandial hyperglycemia the current carbohydrate ratio of 1:12 was reduced to 1-9 and the sensitivity  of 1-40 was reduced to 1-30.  If he experiences any problems with low blood sugars he will call this office.  In the meantime he was provided some samples of pump supplies to assist him with the cost of his current supplies.    The patient will monitor his blood glucose levels using the CGM.  If he develops problematic hyperglycemia or hypoglycemia or adverse drug reactions, he will contact the office for further instructions.        Follow Up     Return in about 3 months (around 8/23/2024) for Pump Eval, CGM Follow-up.    Patient was given instructions and counseling regarding his condition or for health maintenance advice. Please see specific information pulled into the AVS if appropriate.     Jennifer Delgado, APRN  05/23/2024      Dictated Utilizing Dragon Dictation.  Please note that portions of this note were completed with a voice recognition program.  Part of this note may be an electronic transcription/translation of spoken language to printed text using the Dragon Dictation System.

## 2024-06-16 DIAGNOSIS — I42.8 NON-ISCHEMIC CARDIOMYOPATHY: Chronic | ICD-10-CM

## 2024-06-17 RX ORDER — SPIRONOLACTONE 25 MG/1
25 TABLET ORAL DAILY
Qty: 30 TABLET | Refills: 1 | Status: SHIPPED | OUTPATIENT
Start: 2024-06-17

## 2024-07-01 DIAGNOSIS — I42.8 NON-ISCHEMIC CARDIOMYOPATHY: Chronic | ICD-10-CM

## 2024-07-01 RX ORDER — SACUBITRIL AND VALSARTAN 97; 103 MG/1; MG/1
1 TABLET, FILM COATED ORAL 2 TIMES DAILY
Qty: 180 TABLET | Refills: 2 | Status: SHIPPED | OUTPATIENT
Start: 2024-07-01

## 2024-07-25 ENCOUNTER — TELEPHONE (OUTPATIENT)
Dept: CARDIOLOGY | Facility: CLINIC | Age: 54
End: 2024-07-25
Payer: COMMERCIAL

## 2024-07-25 NOTE — TELEPHONE ENCOUNTER
PA Case: [id number], Status: Approved, Coverage Starts on: 7/25/2024 12:00:00 AM, Coverage Ends on: 7/25/2025 12:00:00 AM.

## 2024-08-01 ENCOUNTER — OFFICE VISIT (OUTPATIENT)
Dept: CARDIOLOGY | Facility: CLINIC | Age: 54
End: 2024-08-01
Payer: COMMERCIAL

## 2024-08-01 VITALS
HEIGHT: 67 IN | SYSTOLIC BLOOD PRESSURE: 114 MMHG | WEIGHT: 172 LBS | DIASTOLIC BLOOD PRESSURE: 65 MMHG | HEART RATE: 88 BPM | BODY MASS INDEX: 27 KG/M2

## 2024-08-01 DIAGNOSIS — I25.10 CORONARY ARTERY DISEASE INVOLVING NATIVE CORONARY ARTERY OF NATIVE HEART WITHOUT ANGINA PECTORIS: ICD-10-CM

## 2024-08-01 DIAGNOSIS — E78.2 MIXED HYPERLIPIDEMIA: ICD-10-CM

## 2024-08-01 DIAGNOSIS — Z95.810 ICD (IMPLANTABLE CARDIOVERTER-DEFIBRILLATOR), DUAL, IN SITU: Chronic | ICD-10-CM

## 2024-08-01 DIAGNOSIS — I10 ESSENTIAL HYPERTENSION: ICD-10-CM

## 2024-08-01 DIAGNOSIS — I42.8 NON-ISCHEMIC CARDIOMYOPATHY: Primary | Chronic | ICD-10-CM

## 2024-08-01 PROCEDURE — 99214 OFFICE O/P EST MOD 30 MIN: CPT | Performed by: INTERNAL MEDICINE

## 2024-08-04 NOTE — ASSESSMENT & PLAN NOTE
LDL was 50 per labs done in November of last year.  Continue atorvastatin 80 mg daily.  We will repeat lipid panel and CMP now and adjust the dose of the medication if needed.

## 2024-08-04 NOTE — ASSESSMENT & PLAN NOTE
Stable with no angina.  Continue statins and beta-blockers.  He is also on lifelong Plavix due to recurrent strokes.

## 2024-08-04 NOTE — PROGRESS NOTES
CARDIOLOGY FOLLOW-UP PROGRESS NOTE        Chief Complaint  Follow-up, Cardiomyopathy, Congestive Heart Failure, Hypertension, and Hyperlipidemia    Subjective            Demetrio Mariano presents to [facility] CARDIOLOGY  History of Present Illness    Mr Mariano is here for routine 6-month follow-up visit.  He reports improvement in his symptoms related to stroke.  Speech is much better.  Right arm weakness is improving.  He had no recent episodes of chest pain or shortness of breath.  Denies palpitations or dizziness.  Taking all the medications as prescribed.      Past History:    1) Dilated cardiomyopathy with LV ejection fraction of 20-25%. 2) Nonobstructive coronary artery disease involving diagonal branch per cardiac catheterization done on 01/21/2021. 3) Diabetes mellitus, on insulin pump. 4) 3 episodes of ischemic stroke ( 1/2023 0.9, 4/5/2023 with slurring of speech and balance, left basal ganglia,   and 4/11/2023 with right arm and leg weakness)     Medical History:  Past Medical History:   Diagnosis Date    CHF (congestive heart failure)     Coronary artery disease involving native heart without angina pectoris      Nonobstructive coronary artery disease involving diagonal branch per cardiac catheterization done on 01/21/2021    Essential hypertension, benign     Hypercholesterolemia     ICD (implantable cardioverter-defibrillator), dual, in situ 09/22/2021    Nicotine dependence     Non-ischemic cardiomyopathy (CMS/HCC) 05/03/2021    Mildly dilated left ventricular cavity with severe global hypokinesis with estimated LV ejection fraction of       28%. On echo May 3, 2021    Peyronie's disease     Seizures     started 2021    Stroke     Type 1 diabetes mellitus with vascular disease     Type I diabetes mellitus, uncontrolled        Surgical History: has a past surgical history that includes Other surgical history (06/10/2013); Vasectomy; Cardiac catheterization; Fracture surgery  (Right); and Cardiac electrophysiology procedure (N/A, 06/15/2021).     Family History: family history includes Diabetes in his father and mother; Neuropathy in his father.     Social History: reports that he quit smoking about 16 months ago. His smoking use included cigarettes. He started smoking about 38 years ago. He has a 37.1 pack-year smoking history. He has been exposed to tobacco smoke. He has never used smokeless tobacco. He reports current alcohol use of about 1.0 standard drink of alcohol per week. He reports that he does not use drugs.    Allergies: Patient has no known allergies.    Current Outpatient Medications on File Prior to Visit   Medication Sig    atorvastatin (LIPITOR) 80 MG tablet TAKE 1 TABLET BY MOUTH DAILY    carvedilol (COREG) 25 MG tablet TAKE 1 TABLET BY MOUTH TWICE DAILY    clopidogrel (PLAVIX) 75 MG tablet TAKE 1 TABLET BY MOUTH DAILY    Entresto  MG tablet TAKE 1 TABLET BY MOUTH TWICE A DAY    Glucagon (Baqsimi Two Pack) 3 MG/DOSE powder 3 mg into the nostril(s) as directed by provider As Needed (Severe hypoglycemia).    Insulin Aspart (NovoLOG) 100 UNIT/ML injection Up to 100 per day per insulin pump    insulin patient supplied pump Inject  under the skin into the appropriate area as directed Continuous. Type of Insulin: NOVOLOG  Basal Dose:   12 AM to 6 AM - 2.15u/hr  6 AM to 3 PM - 1.9u/hr  3 PM to 12 AM - 2.115u/hr    Bolus Dose: CORRECTION RATIO 1:40  Prescriber:KATHERINE ROWLAND  Phone number:  Next refill: 4/14/23  MEDTRONIC PUMP    pregabalin (LYRICA) 50 MG capsule     spironolactone (ALDACTONE) 25 MG tablet TAKE 1 TABLET BY MOUTH EVERY DAY     No current facility-administered medications on file prior to visit.          Review of Systems   Respiratory:  Negative for cough, shortness of breath and wheezing.    Cardiovascular:  Negative for chest pain, palpitations and leg swelling.   Gastrointestinal:  Negative for nausea and vomiting.   Neurological:  Positive for weakness.  "Negative for dizziness and syncope.        Objective     /65   Pulse 88   Ht 170.2 cm (67\")   Wt 78 kg (172 lb)   BMI 26.94 kg/m²       Physical Exam    General : Alert, awake, no acute distress  Neck : Supple, no carotid bruit, no jugular venous distention  CVS : Regular rate and rhythm, no murmur, rubs or gallops  Lungs: Clear to auscultation bilaterally, no crackles or rhonchi  Abdomen: Soft, nontender, bowel sounds heard in all 4 quadrants  Extremities: Warm, well-perfused, no pedal edema    Result Review :     The following data was reviewed by: Tommy Nagel MD on 08/01/2024:    No recent labs available for review.  Labs done on 11/10/2023 reviewed.             Data reviewed: Cardiology studies        Results for orders placed during the hospital encounter of 04/11/23    Adult Transthoracic Echo Complete W/ Cont if Necessary Per Protocol (With Agitated Saline)    Interpretation Summary  Borderline normal left ventricular systolic function ejection fraction around 50%.  Trace MR and trace TR.                   Assessment and Plan        Diagnoses and all orders for this visit:    1. Non-ischemic cardiomyopathy (Primary)  Assessment & Plan:  LVEF 50% per echo done last year.  He is clinically not volume overloaded, NYHA class II symptoms.  Continue carvedilol, Entresto and spironolactone at the current dose.      2. ICD (implantable cardioverter-defibrillator), dual, in situ  Assessment & Plan:  Medtronic ICD.  Normally functioning device per home remote interrogation done in June of this year.  He also has a loop recorder in place which was implanted as part of workup for recurrent strokes.  We will continue to monitor remotely.  He will need a interrogation in the office next year.      3. Essential hypertension  Assessment & Plan:  Blood pressure very well-controlled.  Continue current regimen.      4. Mixed hyperlipidemia  Assessment & Plan:  LDL was 50 per labs done in November of last year.  " Continue atorvastatin 80 mg daily.  We will repeat lipid panel and CMP now and adjust the dose of the medication if needed.    Orders:  -     Comprehensive Metabolic Panel; Future  -     Lipid Panel; Future    5. Coronary artery disease involving native coronary artery of native heart without angina pectoris  Assessment & Plan:  Stable with no angina.  Continue statins and beta-blockers.  He is also on lifelong Plavix due to recurrent strokes.    Orders:  -     CBC (No Diff); Future              Follow Up     Return in about 6 months (around 2/1/2025) for Next scheduled follow up, with Danielle ARRIOLA.    Patient was given instructions and counseling regarding his condition or for health maintenance advice. Please see specific information pulled into the AVS if appropriate.

## 2024-08-04 NOTE — ASSESSMENT & PLAN NOTE
LVEF 50% per echo done last year.  He is clinically not volume overloaded, NYHA class II symptoms.  Continue carvedilol, Entresto and spironolactone at the current dose.

## 2024-08-04 NOTE — ASSESSMENT & PLAN NOTE
Medtronic ICD.  Normally functioning device per home remote interrogation done in June of this year.  He also has a loop recorder in place which was implanted as part of workup for recurrent strokes.  We will continue to monitor remotely.  He will need a interrogation in the office next year.

## 2024-08-14 DIAGNOSIS — I42.8 NON-ISCHEMIC CARDIOMYOPATHY: Chronic | ICD-10-CM

## 2024-08-14 RX ORDER — SPIRONOLACTONE 25 MG/1
25 TABLET ORAL DAILY
Qty: 30 TABLET | Refills: 1 | Status: SHIPPED | OUTPATIENT
Start: 2024-08-14

## 2024-08-16 DIAGNOSIS — E78.2 MIXED HYPERLIPIDEMIA: ICD-10-CM

## 2024-08-20 NOTE — PROGRESS NOTES
Chief Complaint  Diabetes (F/u, med mgt, A1c eval, cgm/pump eval)    Referred By: ZEINAB Onofre presents to [facility] for insulin pump management    History of Present Illness    Visit type:  follow-up  Diabetes type:  Type 1  Current diabetes status/concerns/issues:  No concerns with his diabetes today  Other health concerns:  He had an episode of dizziness and hypotension.  He was seen in the ED and was treated for dehydration  Current Diabetes symptoms:    Polyuria: No   Polydipsia: No   Polyphagia: No   Blurred vision: No   Excessive fatigue: No  Known Diabetes complications:  Neuropathy: Pain; Location: Feet and Legs  Renal: No current urine microalbumin available and Normal eGFR per current labs  Eyes: No current eye exam available in record; Location: N/A  Amputation/Wounds: None  GI: None  Cardiovascular: CAD, CHF, Cardiomyopathy, and CVA (History of)  ED: Patient Reported  Other: None  Hypoglycemia:  None reported at this time  Hypoglycemia Symptoms:  No hypoglycemia at this time  Current diabetes treatment:  Medtronic pump currently using NovoLog insulin.  He is using 30 g for a small meal, 45 g for medium size meal and 60 g for large meal when entering carbohydrates into the pump.    Blood glucose device:  [employer] CGM  Blood glucose monitoring frequency:  Continuous per CGM  Blood glucose range/average:  The 14-day sensor report shows an average glucose of 162 mg/dL, with 72% in target range ( mgdL), 23% in the high range (181-250 mg/dL), 5% in the very high range (>250 mg/dL), 0% in the low range (54-70 mg/dL) and 0% in the very low range (<54 mg/dL).   Glucose Source: Device Reviewed  Diet:  Carbohydrate Counting, Limits high carb/sweet foods, Avoids sugary drinks  Activity/Exercise:  None    Past Medical History:   Diagnosis Date    CHF (congestive heart failure)     Coronary artery disease involving native heart  without angina pectoris      Nonobstructive coronary artery disease involving diagonal branch per cardiac catheterization done on 2021    Essential hypertension, benign     Hypercholesterolemia     ICD (implantable cardioverter-defibrillator), dual, in situ 2021    Nicotine dependence     Non-ischemic cardiomyopathy (CMS/HCC) 2021    Mildly dilated left ventricular cavity with severe global hypokinesis with estimated LV ejection fraction of       28%. 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     Type I diabetes mellitus, uncontrolled      Past Surgical History:   Procedure Laterality Date    CARDIAC CATHETERIZATION      CARDIAC ELECTROPHYSIOLOGY PROCEDURE N/A 06/15/2021    Procedure: ICD SC new;  Surgeon: Regulo Coyne MD;  Location: [facility] CATH INVASIVE LOCATION;  Service: Cardiovascular;  Laterality: N/A;    FRACTURE SURGERY Right     crushed heel injury with 14 fractures    OTHER SURGICAL HISTORY  06/10/2013    plication for treatment of pyronie's disease    VASECTOMY       Family History   Problem Relation Age of Onset    Diabetes Mother     Diabetes Father     Neuropathy Father      Social History     Socioeconomic History    Marital status:     Number of children: 2   Tobacco Use    Smoking status: Former     Current packs/day: 0.00     Average packs/day: 1 pack/day for 37.1 years (37.1 ttl pk-yrs)     Types: Cigarettes     Start date: 3/11/1986     Quit date: 2023     Years since quittin.3     Passive exposure: Past    Smokeless tobacco: Never   Vaping Use    Vaping status: Never Used   Substance and Sexual Activity    Alcohol use: Not Currently     Alcohol/week: 6.0 standard drinks of alcohol     Types: 6 Cans of beer per week     Comment: daily    Drug use: Never    Sexual activity: Not Currently     Partners: Female     Birth control/protection: Condom, Post-menopausal, Tubal  "ligation, Vasectomy, Partner of same sex, Pill     No Known Allergies    Current Outpatient Medications:     atorvastatin (LIPITOR) 80 MG tablet, TAKE 1 TABLET BY MOUTH DAILY, Disp: 90 tablet, Rfl: 3    carvedilol (COREG) 25 MG tablet, TAKE 1 TABLET BY MOUTH TWICE DAILY, Disp: 180 tablet, Rfl: 3    clopidogrel (PLAVIX) 75 MG tablet, TAKE 1 TABLET BY MOUTH DAILY, Disp: 90 tablet, Rfl: 3    Entresto  MG tablet, TAKE 1 TABLET BY MOUTH TWICE A DAY, Disp: 180 tablet, Rfl: 2    Glucagon (Baqsimi Two Pack) 3 MG/DOSE powder, 3 mg into the nostril(s) as directed by provider As Needed (Severe hypoglycemia)., Disp: 1 each, Rfl: 2    Insulin Aspart (NovoLOG) 100 UNIT/ML injection, Up to 100 per day per insulin pump, Disp: 30 mL, Rfl: 5    insulin patient supplied pump, Inject  under the skin into the appropriate area as directed Continuous. Type of Insulin: NOVOLOG Basal Dose:  12 AM to 6 AM - 2.15u/hr 6 AM to 3 PM - 1.9u/hr 3 PM to 12 AM - 2.115u/hr  Bolus Dose: CORRECTION RATIO 1:40 Prescriber:KATHERINE ROWLAND Phone number: Next refill: 4/14/23 MEDTRONIC PUMP, Disp: , Rfl:     pregabalin (LYRICA) 50 MG capsule, , Disp: , Rfl:     spironolactone (ALDACTONE) 25 MG tablet, TAKE 1 TABLET BY MOUTH EVERY DAY, Disp: 30 tablet, Rfl: 1    Objective     Vitals:    08/22/24 0951   BP: 109/77   BP Location: Right arm   Patient Position: Sitting   Cuff Size: Adult   Pulse: 78   Temp: 97.6 °F (36.4 °C)   TempSrc: Temporal   SpO2: 99%   Weight: 77.9 kg (171 lb 12.8 oz)   Height: 170.2 cm (67\")     Body mass index is 26.91 kg/m².    Physical Exam  Constitutional:       Appearance: Normal appearance.      Comments: Overweight (BMI 25 - 29.9) Pt Current BMI = 26.91    HENT:      Head: Normocephalic and atraumatic.      Right Ear: External ear normal.      Left Ear: External ear normal.      Nose: Nose normal.   Eyes:      Extraocular Movements: Extraocular movements intact.      Conjunctiva/sclera: Conjunctivae normal.   Pulmonary:      " Effort: Pulmonary effort is normal.   Musculoskeletal:         General: Normal range of motion.      Cervical back: Normal range of motion.   Skin:     General: Skin is warm and dry.   Neurological:      General: No focal deficit present.      Mental Status: He is alert and oriented to person, place, and time. Mental status is at baseline.   Psychiatric:         Mood and Affect: Mood normal.         Behavior: Behavior normal.         Thought Content: Thought content normal.         Judgment: Judgment normal.             Result Review :   The following data was reviewed by: ZEINAB Goel on 08/22/2024:    Most Recent A1C          8/22/2024    09:58   HGBA1C Most Recent   Hemoglobin A1C 7.9        A1C Last 3 Results          2/21/2024    10:52 5/23/2024    09:33 8/22/2024    09:58   HGBA1C Last 3 Results   Hemoglobin A1C 8.1  7.5  7.9      A1c collected in the office today is 7.9%, indicating Uncontrolled Type I diabetes.  This result is up from the prior result of 7.5% collected on 5/23/24     Glucose   Date Value Ref Range Status   08/22/2024 205 (H) 70 - 99 mg/dL Final     Comment:     Serial Number: [id number]:  [id number]     Point of care glucose in the office today is  elevated at 205 mg/dL              Assessment: He has an increase in his A1c since the prior evaluation.  He has improved his compliance with putting his carbohydrates in his pump but despite that activity he is having some postprandial hyperglycemia.  He was recently  seen in the emergency room due to dehydration causing orthostatic hypotension.  The patient was encouraged to try to integrate some water into his diet.  He is currently drinking diet Mountain Dew most of the time.  He was encouraged to integrate water in between these drinks to eliminate the risk of dehydration occurring again.      Diagnoses and all orders for this visit:    1. Uncontrolled type 1 diabetes mellitus with hyperglycemia (Primary)  -      Microalbumin / Creatinine Urine Ratio - Urine, Clean Catch; Future  -     POC Glycosylated Hemoglobin (Hb A1C)    2. Type 1 diabetes mellitus with diabetic neuropathy    3. Insulin pump in place    4. Insulin pump titration    5. Uses self-applied continuous glucose monitoring device    6. Overweight (BMI 25.0-29.9)    Other orders  -     POC Glucose        Plan: Because of the postprandial hyperglycemia the current carbohydrate ratio of 1:9 was reduced to 1-7.  The patient will call the office if this causes any problems with hypoglycemia occurring after the meals.  No other changes were made to the pump settings today.  The patient will be scheduled for routine follow-up appointment.    The patient will monitor his blood glucose levels using his CGM.  If he develops problematic hyperglycemia or hypoglycemia or adverse drug reactions, he will contact the office for further instructions.        Follow Up     Return in about 3 months (around 11/22/2024) for Pump Eval, CGM Follow-up.    Patient was given instructions and counseling regarding his condition or for health maintenance advice. Please see specific information pulled into the AVS if appropriate.     ZEINAB Goel  08/22/2024      Dictated Utilizing Dragon Dictation.  Please note that portions of this note were completed with a voice recognition program.  Part of this note may be an electronic transcription/translation of spoken language to printed text using the Dragon Dictation System.

## 2024-08-22 ENCOUNTER — OFFICE VISIT (OUTPATIENT)
Dept: DIABETES SERVICES | Facility: HOSPITAL | Age: 54
End: 2024-08-22
Payer: COMMERCIAL

## 2024-08-22 VITALS
HEIGHT: 67 IN | SYSTOLIC BLOOD PRESSURE: 109 MMHG | TEMPERATURE: 97.6 F | WEIGHT: 171.8 LBS | BODY MASS INDEX: 26.97 KG/M2 | HEART RATE: 78 BPM | OXYGEN SATURATION: 99 % | DIASTOLIC BLOOD PRESSURE: 77 MMHG

## 2024-08-22 DIAGNOSIS — E10.40 TYPE 1 DIABETES MELLITUS WITH DIABETIC NEUROPATHY: ICD-10-CM

## 2024-08-22 DIAGNOSIS — E66.3 OVERWEIGHT (BMI 25.0-29.9): ICD-10-CM

## 2024-08-22 DIAGNOSIS — Z46.81 INSULIN PUMP TITRATION: ICD-10-CM

## 2024-08-22 DIAGNOSIS — E10.65 UNCONTROLLED TYPE 1 DIABETES MELLITUS WITH HYPERGLYCEMIA: Primary | ICD-10-CM

## 2024-08-22 DIAGNOSIS — Z97.8 USES SELF-APPLIED CONTINUOUS GLUCOSE MONITORING DEVICE: ICD-10-CM

## 2024-08-22 DIAGNOSIS — Z96.41 INSULIN PUMP IN PLACE: ICD-10-CM

## 2024-08-22 LAB
ALBUMIN UR-MCNC: <1.2 MG/DL
CREAT UR-MCNC: 102.2 MG/DL
EXPIRATION DATE: ABNORMAL
GLUCOSE BLDC GLUCOMTR-MCNC: 205 MG/DL (ref 70–99)
HBA1C MFR BLD: 7.9 % (ref 4.5–5.7)
Lab: ABNORMAL
MICROALBUMIN/CREAT UR: NORMAL MG/G{CREAT}

## 2024-08-22 PROCEDURE — 83036 HEMOGLOBIN GLYCOSYLATED A1C: CPT | Performed by: NURSE PRACTITIONER

## 2024-08-22 PROCEDURE — 82043 UR ALBUMIN QUANTITATIVE: CPT | Performed by: NURSE PRACTITIONER

## 2024-08-22 PROCEDURE — 82948 REAGENT STRIP/BLOOD GLUCOSE: CPT | Performed by: NURSE PRACTITIONER

## 2024-08-22 PROCEDURE — G0463 HOSPITAL OUTPT CLINIC VISIT: HCPCS | Performed by: NURSE PRACTITIONER

## 2024-08-22 PROCEDURE — 82570 ASSAY OF URINE CREATININE: CPT | Performed by: NURSE PRACTITIONER

## 2024-10-31 ENCOUNTER — TELEPHONE (OUTPATIENT)
Dept: CARDIOLOGY | Facility: CLINIC | Age: 54
End: 2024-10-31
Payer: COMMERCIAL

## 2024-10-31 NOTE — TELEPHONE ENCOUNTER
[facility] received a fax that requires your attention. The document has been indexed to the patient’s chart for your review.      Reason for sending: EXTERNAL MEDICAL RECORD NOTIFICATION     Documents Description: CARDIAC CLEARANCE REQ-[facility]-10.31.24    Name of Sender: [facility]     Date Indexed: 10.31.24

## 2024-10-31 NOTE — TELEPHONE ENCOUNTER
Procedure: Colonoscopy and/or EGD     Med Directive: Plavix     PMH: dilated CM, nonobstructive CAD, ICD, HTN, HLD, CVA     Last Seen: 8/1/24

## 2024-11-04 NOTE — TELEPHONE ENCOUNTER
Patient may proceed with upcoming colonoscopy and/or EGD at moderate risk for perioperative cardiac events.  He may hold Plavix for 5 days prior to procedure.

## 2024-11-19 NOTE — TELEPHONE ENCOUNTER
Patient requesting insulin refill. Spoke with pt - he has an appt on 11/21 and has enough insulin to last until his appt. Refill will be sent at appt

## 2024-11-21 ENCOUNTER — OFFICE VISIT (OUTPATIENT)
Dept: DIABETES SERVICES | Facility: HOSPITAL | Age: 54
End: 2024-11-21
Payer: COMMERCIAL

## 2024-11-21 VITALS
WEIGHT: 176 LBS | TEMPERATURE: 98.2 F | SYSTOLIC BLOOD PRESSURE: 135 MMHG | OXYGEN SATURATION: 99 % | BODY MASS INDEX: 27.62 KG/M2 | HEIGHT: 67 IN | DIASTOLIC BLOOD PRESSURE: 80 MMHG | HEART RATE: 77 BPM

## 2024-11-21 DIAGNOSIS — Z46.81 INSULIN PUMP TITRATION: ICD-10-CM

## 2024-11-21 DIAGNOSIS — Z96.41 INSULIN PUMP IN PLACE: ICD-10-CM

## 2024-11-21 DIAGNOSIS — E10.40 TYPE 1 DIABETES MELLITUS WITH DIABETIC NEUROPATHY: ICD-10-CM

## 2024-11-21 DIAGNOSIS — Z97.8 USES SELF-APPLIED CONTINUOUS GLUCOSE MONITORING DEVICE: ICD-10-CM

## 2024-11-21 DIAGNOSIS — E10.65 UNCONTROLLED TYPE 1 DIABETES MELLITUS WITH HYPERGLYCEMIA: Primary | ICD-10-CM

## 2024-11-21 DIAGNOSIS — E66.3 OVERWEIGHT (BMI 25.0-29.9): ICD-10-CM

## 2024-11-21 LAB
EXPIRATION DATE: ABNORMAL
GLUCOSE BLDC GLUCOMTR-MCNC: 323 MG/DL (ref 70–99)
HBA1C MFR BLD: 7.6 % (ref 4.5–5.7)
Lab: ABNORMAL

## 2024-11-21 PROCEDURE — 99214 OFFICE O/P EST MOD 30 MIN: CPT | Performed by: NURSE PRACTITIONER

## 2024-11-21 PROCEDURE — 95251 CONT GLUC MNTR ANALYSIS I&R: CPT | Performed by: NURSE PRACTITIONER

## 2024-11-21 PROCEDURE — 83036 HEMOGLOBIN GLYCOSYLATED A1C: CPT | Performed by: NURSE PRACTITIONER

## 2024-11-21 PROCEDURE — 82948 REAGENT STRIP/BLOOD GLUCOSE: CPT | Performed by: NURSE PRACTITIONER

## 2024-11-21 PROCEDURE — G0463 HOSPITAL OUTPT CLINIC VISIT: HCPCS | Performed by: NURSE PRACTITIONER

## 2024-11-21 RX ORDER — INSULIN ASPART 100 [IU]/ML
INJECTION, SOLUTION INTRAVENOUS; SUBCUTANEOUS
Qty: 90 ML | Refills: 1 | Status: SHIPPED | OUTPATIENT
Start: 2024-11-21

## 2024-11-21 NOTE — PROGRESS NOTES
Chief Complaint  Diabetes (Med mgt, A1c eval, cgm/pump eval)    Referred By: ZEINAB Onofre presents to [facility] for insulin pump management    History of Present Illness    Visit type:  follow-up  Diabetes type:  Type 1  Current diabetes status/concerns/issues:  No concerns with his diabetes  Other health concerns:  He is having some increasing numbness in the right foot but this is the foot that he had major fractures in 3 years ago  Current Diabetes symptoms:    Polyuria: No   Polydipsia: No   Polyphagia: No   Blurred vision: No   Excessive fatigue: No  Known Diabetes complications:  Neuropathy: Pain and Numbness; Location: Feet and Legs  Renal: Normal eGFR per current labs and Microalbuminuria - NEGATIVE  Eyes: No current eye exam available in record; Location: N/A  Amputation/Wounds: None  GI: None  Cardiovascular: CAD, CHF, Cardiomyopathy, and CVA (History of)  ED: Patient Reported  Other: None  Hypoglycemia:  None reported at this time  Hypoglycemia Symptoms:  No hypoglycemia at this time  Current diabetes treatment:  Medtronic pump currently using NovoLog insulin.  He is using 30 g for a small meal, 45 g for medium size meal and 60 g for large meal when entering carbohydrates into the pump.    Blood glucose device:  Medtronic CGM  Blood glucose monitoring frequency:  Continuous per CGM  Blood glucose range/average:  The 14-day sensor report shows an average glucose of 158 mg/dL, with 75% in target range ( mgdL), 16% in the high range (181-250 mg/dL), 9% in the very high range (>250 mg/dL), 0% in the low range (54-70 mg/dL) and 0% in the very low range (<54 mg/dL).   Glucose Source: Device Reviewed  Diet:  Carbohydrate Counting, Limits high carb/sweet foods, Avoids sugary drinks  Activity/Exercise:  None    Past Medical History:   Diagnosis Date    CHF (congestive heart failure)     Coronary artery disease involving  native heart without angina pectoris      Nonobstructive coronary artery disease involving diagonal branch per cardiac catheterization done on 2021    Essential hypertension, benign     Hypercholesterolemia     ICD (implantable cardioverter-defibrillator), dual, in situ 2021    Nicotine dependence     Non-ischemic cardiomyopathy (CMS/HCC) 2021    Mildly dilated left ventricular cavity with severe global hypokinesis with estimated LV ejection fraction of       28%. 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     Type I diabetes mellitus, uncontrolled      Past Surgical History:   Procedure Laterality Date    CARDIAC CATHETERIZATION      CARDIAC ELECTROPHYSIOLOGY PROCEDURE N/A 06/15/2021    Procedure: ICD SC new;  Surgeon: Regulo Coyne MD;  Location: [facility] CATH INVASIVE LOCATION;  Service: Cardiovascular;  Laterality: N/A;    FRACTURE SURGERY Right     crushed heel injury with 14 fractures    OTHER SURGICAL HISTORY  06/10/2013    plication for treatment of pyronie's disease    VASECTOMY       Family History   Problem Relation Age of Onset    Diabetes Mother     Diabetes Father     Neuropathy Father      Social History     Socioeconomic History    Marital status:     Number of children: 2   Tobacco Use    Smoking status: Former     Current packs/day: 0.00     Average packs/day: 1 pack/day for 37.1 years (37.1 ttl pk-yrs)     Types: Cigarettes     Start date: 3/11/1986     Quit date: 2023     Years since quittin.6     Passive exposure: Past    Smokeless tobacco: Never   Vaping Use    Vaping status: Never Used   Substance and Sexual Activity    Alcohol use: Not Currently     Alcohol/week: 6.0 standard drinks of alcohol     Types: 6 Cans of beer per week     Comment: daily    Drug use: Never    Sexual activity: Not Currently     Partners: Female     Birth control/protection: Condom, Post-menopausal,  "Tubal ligation, Vasectomy, Partner of same sex, Pill     No Known Allergies    Current Outpatient Medications:     atorvastatin (LIPITOR) 80 MG tablet, TAKE 1 TABLET BY MOUTH DAILY, Disp: 90 tablet, Rfl: 3    carvedilol (COREG) 25 MG tablet, TAKE 1 TABLET BY MOUTH TWICE DAILY, Disp: 180 tablet, Rfl: 3    clopidogrel (PLAVIX) 75 MG tablet, TAKE 1 TABLET BY MOUTH DAILY, Disp: 90 tablet, Rfl: 3    Entresto  MG tablet, TAKE 1 TABLET BY MOUTH TWICE A DAY, Disp: 180 tablet, Rfl: 2    Glucagon (Baqsimi Two Pack) 3 MG/DOSE powder, 3 mg into the nostril(s) as directed by provider As Needed (Severe hypoglycemia)., Disp: 1 each, Rfl: 2    Insulin Aspart (NovoLOG) 100 UNIT/ML injection, Up to 100 per day per insulin pump, Disp: 90 mL, Rfl: 1    insulin patient supplied pump, Inject  under the skin into the appropriate area as directed Continuous. Type of Insulin: NOVOLOG Basal Dose:  12 AM to 6 AM - 2.15u/hr 6 AM to 3 PM - 1.9u/hr 3 PM to 12 AM - 2.115u/hr  Bolus Dose: CORRECTION RATIO 1:40 Prescriber:KATHERINE ROWLAND Phone number: Next refill: 4/14/23 MEDTRONIC PUMP, Disp: , Rfl:     pregabalin (LYRICA) 50 MG capsule, , Disp: , Rfl:     spironolactone (ALDACTONE) 25 MG tablet, TAKE 1 TABLET BY MOUTH EVERY DAY, Disp: 30 tablet, Rfl: 1    Objective     Vitals:    11/21/24 1005   BP: 135/80   BP Location: Right arm   Patient Position: Sitting   Cuff Size: Adult   Pulse: 77   Temp: 98.2 °F (36.8 °C)   TempSrc: Temporal   SpO2: 99%   Weight: 79.8 kg (176 lb)   Height: 170.2 cm (67\")     Body mass index is 27.57 kg/m².    Physical Exam  Constitutional:       Appearance: Normal appearance.      Comments: Overweight (BMI 25 - 29.9) Pt Current BMI = 27.57    HENT:      Head: Normocephalic and atraumatic.      Right Ear: External ear normal.      Left Ear: External ear normal.      Nose: Nose normal.   Eyes:      Extraocular Movements: Extraocular movements intact.      Conjunctiva/sclera: Conjunctivae normal.   Pulmonary:      " Effort: Pulmonary effort is normal.   Musculoskeletal:         General: Normal range of motion.      Cervical back: Normal range of motion.   Skin:     General: Skin is warm and dry.   Neurological:      General: No focal deficit present.      Mental Status: He is alert and oriented to person, place, and time. Mental status is at baseline.   Psychiatric:         Mood and Affect: Mood normal.         Behavior: Behavior normal.         Thought Content: Thought content normal.         Judgment: Judgment normal.             Result Review :   The following data was reviewed by: ZEINAB Goel on 11/21/2024:    Most Recent A1C          11/21/2024    10:08   HGBA1C Most Recent   Hemoglobin A1C 7.6        A1C Last 3 Results          5/23/2024    09:33 8/22/2024    09:58 11/21/2024    10:08   HGBA1C Last 3 Results   Hemoglobin A1C 7.5  7.9  7.6      A1c collected in the office today is 7.6%, indicating Uncontrolled Type I diabetes.  This result is down from the prior result of 7.9% collected on 8/22/24     Glucose   Date Value Ref Range Status   11/21/2024 323 (H) 70 - 99 mg/dL Final     Comment:     Serial Number: [id number]:  [id number]     Point of care glucose in the office today is  elevated at 323 mg/dL      Microalbumin, Urine   Date Value Ref Range Status   08/22/2024 <1.2 mg/dL Final     Creatinine, Urine   Date Value Ref Range Status   08/22/2024 102.2 mg/dL Final     Microalbumin/Creatinine Ratio   Date Value Ref Range Status   08/22/2024   Final     Comment:     Unable to calculate     Urine microalbuminuria collected on 8/22/24 is negative for microalbuminuria             Assessment: He has an improvement in his A1c but does remain above target.  The CGM shows glucose levels are more elevated after 3:00 in the evening.  This is not high his basal rate setting on his pump.  There are some missed meal boluses which could be contributing factor.      Diagnoses and all orders for this visit:    1.  Uncontrolled type 1 diabetes mellitus with hyperglycemia (Primary)  -     POC Glycosylated Hemoglobin (Hb A1C)  -     Insulin Aspart (NovoLOG) 100 UNIT/ML injection; Up to 100 per day per insulin pump  Dispense: 90 mL; Refill: 1    2. Type 1 diabetes mellitus with diabetic neuropathy    3. Insulin pump in place    4. Insulin pump titration    5. Uses self-applied continuous glucose monitoring device    6. Overweight (BMI 25.0-29.9)    Other orders  -     POC Glucose        Plan: Patient was strongly encouraged to enter his carbohydrates in a timely manner before each meal and snack.  The current basal rate to 0.15 at 3 PM was increased to 2.3 units/h.  No other changes were made to the pump settings at this time.    The patient will monitor his blood glucose levels using the CGM.  If he develops problematic hyperglycemia or hypoglycemia or adverse drug reactions, he will contact the office for further instructions.        Follow Up     Return in about 3 months (around 2/21/2025) for Pump Eval, CGM Follow-up.    Patient was given instructions and counseling regarding his condition or for health maintenance advice. Please see specific information pulled into the AVS if appropriate.     Jennifer Delgado, APRN  11/21/2024      Dictated Utilizing Dragon Dictation.  Please note that portions of this note were completed with a voice recognition program.  Part of this note may be an electronic transcription/translation of spoken language to printed text using the Dragon Dictation System.

## 2024-11-30 DIAGNOSIS — I42.8 NON-ISCHEMIC CARDIOMYOPATHY: Chronic | ICD-10-CM

## 2024-12-03 RX ORDER — SPIRONOLACTONE 25 MG/1
25 TABLET ORAL DAILY
Qty: 90 TABLET | Refills: 1 | Status: SHIPPED | OUTPATIENT
Start: 2024-12-03

## 2025-01-02 ENCOUNTER — PRIOR AUTHORIZATION (OUTPATIENT)
Dept: DIABETES SERVICES | Facility: HOSPITAL | Age: 55
End: 2025-01-02
Payer: COMMERCIAL

## 2025-02-03 ENCOUNTER — OFFICE VISIT (OUTPATIENT)
Dept: CARDIOLOGY | Facility: CLINIC | Age: 55
End: 2025-02-03
Payer: COMMERCIAL

## 2025-02-03 VITALS
BODY MASS INDEX: 27.89 KG/M2 | WEIGHT: 177.7 LBS | HEART RATE: 79 BPM | HEIGHT: 67 IN | SYSTOLIC BLOOD PRESSURE: 125 MMHG | DIASTOLIC BLOOD PRESSURE: 62 MMHG

## 2025-02-03 DIAGNOSIS — I25.10 CORONARY ARTERY DISEASE INVOLVING NATIVE CORONARY ARTERY OF NATIVE HEART WITHOUT ANGINA PECTORIS: ICD-10-CM

## 2025-02-03 DIAGNOSIS — Z95.810 ICD (IMPLANTABLE CARDIOVERTER-DEFIBRILLATOR), DUAL, IN SITU: ICD-10-CM

## 2025-02-03 DIAGNOSIS — E78.2 MIXED HYPERLIPIDEMIA: ICD-10-CM

## 2025-02-03 DIAGNOSIS — I42.8 NON-ISCHEMIC CARDIOMYOPATHY: Primary | ICD-10-CM

## 2025-02-03 DIAGNOSIS — I10 ESSENTIAL HYPERTENSION: ICD-10-CM

## 2025-02-03 PROCEDURE — 99214 OFFICE O/P EST MOD 30 MIN: CPT | Performed by: FAMILY MEDICINE

## 2025-02-03 RX ORDER — CARVEDILOL 12.5 MG/1
1 TABLET ORAL EVERY 12 HOURS SCHEDULED
COMMUNITY
Start: 2024-12-22

## 2025-02-03 NOTE — PROGRESS NOTES
Chief Complaint  Follow-up    Subjective        History of Present Illness  Demetrio Mariano presents to [facility] CARDIOLOG[facility]   Mr. Mariano is a 54-year-old male patient coming in today for routine cardiac follow-up.  Overall he is doing well, and has no new concerns at visit today.  He states he did have a ER evaluation in August, due to low blood pressure secondary to being dehydrated.  No further issues with his blood pressure since.  He has no complaints aside, shortness of breath palpitations lightheadedness or dizziness.  He continues to improve since his previous stroke, although he continues to have some right-sided weakness    Past History:     1) Dilated cardiomyopathy with LV ejection fraction of 20-25%. 2) Nonobstructive coronary artery disease involving diagonal branch per cardiac catheterization done on 01/21/2021. 3) Diabetes mellitus, on insulin pump. 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     Type I diabetes mellitus, uncontrolled        No Known Allergies     Past Surgical History:   Procedure Laterality Date    CARDIAC CATHETERIZATION      CARDIAC ELECTROPHYSIOLOGY PROCEDURE N/A 06/15/2021    Procedure: ICD SC new;  Surgeon: Regulo Coyne MD;  Location: [facility] INVASIVE LOCATION;  Service: Cardiovascular;  Laterality: N/A;    FRACTURE SURGERY Right     crushed heel injury with 14 fractures    OTHER  SURGICAL HISTORY  06/10/2013    plication for treatment of pyronie's disease    VASECTOMY          Social History  He  reports that he has been smoking cigarettes. He started smoking about 38 years ago. He has a 37.1 pack-year smoking history. He has been exposed to tobacco smoke. He has never used smokeless tobacco. He reports that he does not currently use alcohol after a past usage of about 6.0 standard drinks of alcohol per week. He reports that he does not use drugs.    Family History  His family history includes Diabetes in his father and mother; Neuropathy in his father.       Current Outpatient Medications on File Prior to Visit   Medication Sig    atorvastatin (LIPITOR) 80 MG tablet TAKE 1 TABLET BY MOUTH DAILY    carvedilol (COREG) 12.5 MG tablet Take 1 tablet by mouth Every 12 (Twelve) Hours.    clopidogrel (PLAVIX) 75 MG tablet TAKE 1 TABLET BY MOUTH DAILY    Entresto  MG tablet TAKE 1 TABLET BY MOUTH TWICE A DAY    Glucagon (Baqsimi Two Pack) 3 MG/DOSE powder 3 mg into the nostril(s) as directed by provider As Needed (Severe hypoglycemia).    Insulin Aspart (NovoLOG) 100 UNIT/ML injection Up to 100 per day per insulin pump    insulin patient supplied pump Inject  under the skin into the appropriate area as directed Continuous. Type of Insulin: NOVOLOG  Basal Dose:   12 AM to 6 AM - 2.15u/hr  6 AM to 3 PM - 1.9u/hr  3 PM to 12 AM - 2.115u/hr    Bolus Dose: CORRECTION RATIO 1:40  Prescriber:KATHERINE ROWLAND  Phone number:  Next refill: 4/14/23  MEDTRONIC PUMP    pregabalin (LYRICA) 50 MG capsule     spironolactone (ALDACTONE) 25 MG tablet TAKE 1 TABLET BY MOUTH EVERY DAY     No current facility-administered medications on file prior to visit.         Review of Systems   Constitutional:  Negative for fatigue.   Respiratory:  Negative for cough, chest tightness and shortness of breath.    Cardiovascular:  Negative for chest pain, palpitations and leg swelling.   Gastrointestinal:  Negative for nausea and  "vomiting.   Neurological:  Positive for weakness. Negative for dizziness and syncope.        Objective   Vitals:    02/03/25 1057   BP: 125/62   Pulse: 79   Weight: 80.6 kg (177 lb 11.2 oz)   Height: 170.2 cm (67.01\")         Physical Exam  General : Alert, awake, no acute distress  Neck : Supple, no carotid bruit, no jugular venous distention  CVS : Regular rate and rhythm, no murmur, no rubs or gallops  Lungs: Clear to auscultation bilaterally, no crackles or rhonchi  Abdomen: Soft, nontender, bowel sounds active  Extremities: Warm, well-perfused, no pedal edema  Musculoskeletal: Right arm weakness    Result Review     The following data was reviewed by Danielle Dawson, APRN  No results found for: \"PROBNP\"       No results found for: \"TSH\"   No results found for: \"FREET4\"   No results found for: \"DDIMERQUANT\"  Magnesium   Date Value Ref Range Status   04/13/2023 2.0 1.6 - 2.6 mg/dL Final      No results found for: \"DIGOXIN\"   Lab Results   Component Value Date    TROPONINT 12 04/11/2023      Component 11/15/24 08/17/24 08/15/24 11/10/23 01/09/23 07/20/22   GLUCOSE- High  99 119 High  127 High  272 High  142 High    BUN-TL 10 17 18 13 14 8   CREATININE-TL 0.8 1.3 0.8 0.7 Low  0.9 0.6 Low    SODIUM- 142 138 139 135 Low  138   POTASSIUM-TL 4.1 4.6 3.9 4.3 5.6 High  4.5   Chloride 101 106 105 102 100 103   CARBON DIOXIDE-TL 22 21 Low  23 27 29 30   CALCIUM-TL 8.8 9.6 9.3 9.1 9.1 9.0   TOTAL PROTEIN-TL -- 7.1 6.6 7.0 -- 7.1   ALBUMIN-TL -- 4.6 4.2 4.7 -- 4.6   TOTAL BILIRUBIN-TL -- 0.76 0.63 1.11 -- 0.37   GOT (AST)-TL -- 38 42 28 -- 30   GPT (ALT)-TL -- 54 High  50 High  52 High  -- 30   ALP-TL -- 92 100 83 -- 119   GFR ESTIMATE-  66  106  112  103  118    CALCULATED OSMO- Low  275 270 Low  271 Low  271 Low  268 Low    ANION GAP-TL 20 High  19 High  14 High  15 High  12 10            Component 08/15/24 11/10/23 07/20/22   CHOLESTEROL 2-  114  172    TRIGLYCERIDES 2-  88  195  "   HDL-TL 47  46 Low   77    LDL 42 50 56   RISK FACTOR-TL 2  2  2          Results for orders placed during the hospital encounter of 04/11/23    Adult Transthoracic Echo Complete W/ Cont if Necessary Per Protocol (With Agitated Saline)    Interpretation Summary  Borderline normal left ventricular systolic function ejection fraction around 50%.  Trace MR and trace TR.             Assessment and Plan   Diagnoses and all orders for this visit:    1. Non-ischemic cardiomyopathy (Primary)  Assessment & Plan:  Stable NYHA class II symptoms without any evidence of volume overload.  Continue current medication management with carvedilol, Entresto, and spironolactone.           2. ICD (implantable cardioverter-defibrillator), dual, in situ  Assessment & Plan:  Continue home remote monitoring, device interrogation at next office visit.      3. Essential hypertension  Assessment & Plan:  Blood pressure is well-controlled, he may continue current regiment with carvedilol, Entresto, and spironolactone.      4. Mixed hyperlipidemia  Assessment & Plan:   LDL is at goal, continue current dose atorvastatin 80 mg nightly.    Orders:  -     Comprehensive Metabolic Panel; Future  -     Lipid Panel; Future    5. Coronary artery disease involving native coronary artery of native heart without angina pectoris  Assessment & Plan:   he is stable without symptoms of angina.  Continue beta-blocker and statin.  He will also be on lifelong Plavix due to reoccurring strokes.                  Follow Up   Return in about 6 months (around 8/3/2025) for with Dr. Nagel, with Device check.    Patient was given instructions and counseling regarding his condition or for health maintenance advice. Please see specific information pulled into the AVS if appropriate.     Signed,  Danielle Dawson, APRN  02/03/2025     Dictated Utilizing Dragon Dictation: Please note that portions of this note were completed with a voice recognition program.  Part of this  note may be an electronic transcription/translation of spoken language to printed text using the Dragon Dictation System.

## 2025-02-10 ENCOUNTER — OFFICE VISIT (OUTPATIENT)
Dept: DIABETES SERVICES | Facility: HOSPITAL | Age: 55
End: 2025-02-10
Payer: COMMERCIAL

## 2025-02-10 VITALS
HEART RATE: 73 BPM | DIASTOLIC BLOOD PRESSURE: 74 MMHG | SYSTOLIC BLOOD PRESSURE: 140 MMHG | BODY MASS INDEX: 27.47 KG/M2 | HEIGHT: 67 IN | OXYGEN SATURATION: 100 % | WEIGHT: 175 LBS | TEMPERATURE: 98 F

## 2025-02-10 DIAGNOSIS — Z97.8 USES SELF-APPLIED CONTINUOUS GLUCOSE MONITORING DEVICE: ICD-10-CM

## 2025-02-10 DIAGNOSIS — Z96.41 INSULIN PUMP IN PLACE: ICD-10-CM

## 2025-02-10 DIAGNOSIS — E10.40 TYPE 1 DIABETES MELLITUS WITH DIABETIC NEUROPATHY: ICD-10-CM

## 2025-02-10 DIAGNOSIS — E10.65 UNCONTROLLED TYPE 1 DIABETES MELLITUS WITH HYPERGLYCEMIA: Primary | ICD-10-CM

## 2025-02-10 LAB
EXPIRATION DATE: ABNORMAL
GLUCOSE BLDC GLUCOMTR-MCNC: 142 MG/DL (ref 70–99)
HBA1C MFR BLD: 7.6 % (ref 4.5–5.7)
Lab: ABNORMAL

## 2025-02-10 PROCEDURE — 83036 HEMOGLOBIN GLYCOSYLATED A1C: CPT | Performed by: NURSE PRACTITIONER

## 2025-02-10 PROCEDURE — G0463 HOSPITAL OUTPT CLINIC VISIT: HCPCS | Performed by: NURSE PRACTITIONER

## 2025-02-10 PROCEDURE — 95251 CONT GLUC MNTR ANALYSIS I&R: CPT | Performed by: NURSE PRACTITIONER

## 2025-02-10 PROCEDURE — 99213 OFFICE O/P EST LOW 20 MIN: CPT | Performed by: NURSE PRACTITIONER

## 2025-02-10 PROCEDURE — 82948 REAGENT STRIP/BLOOD GLUCOSE: CPT | Performed by: NURSE PRACTITIONER

## 2025-02-10 NOTE — PROGRESS NOTES
Chief Complaint  Diabetes (Med management, A1C, CGM/Pump Eval)    Referred By: ZEINAB Onofre    Subjective          Patient or patient representative verbalized consent for the use of Ambient Listening during the visit with  ZEINAB Goel for chart documentation. 2/10/2025  09:54 NADER Mariano presents to [facility] for insulin pump management    History of Present Illness    Visit type:  follow-up  Diabetes type:  Type 1  Current diabetes status/concerns/issues:     History of Present Illness  The patient presents for evaluation of diabetes.    He reports no current concerns related to his diabetes. His blood glucose levels have been stable, with occasional readings of 78 or 80 on his pump. He has experienced hypoglycemia twice, with levels dropping below 70, but he typically suspends the pump when this occurs. He believes that he may need to adjust his bolus insulin dose due to underestimation of his carbohydrate intake. He recalls an incident where he consumed chips and cheese, administered 4 units of insulin, and subsequently experienced a rise in blood glucose levels that took an extended period to normalize. He admits to occasional overeating. He does not require any medication refills at this time. He reports no new health issues since his last visit. He is currently using a Medtronic pump and NovoLog insulin, with a regimen of 30 g for small meals and 45 g for medium-sized meals. He adheres to a diet devoid of sugary beverages and does not engage in regular physical activity due to fatigue, which he attributes to the residual effects of a previous stroke.        Current Diabetes symptoms:    Polyuria: No   Polydipsia: No   Polyphagia: No   Blurred vision: No   Excessive fatigue: No  Known Diabetes complications:  Neuropathy: Pain and Numbness; Location: Feet and Legs  Renal: Normal eGFR per current labs and Microalbuminuria - NEGATIVE  Eyes: No  current eye exam available in record; Location: N/A; Date of Last Exam: N/A  Amputation/Wounds: None  GI: None  Cardiovascular: CAD, CHF, Cardiomyopathy, and CVA (History of)  ED: Patient Reported  Other: None  Hypoglycemia:  None reported at this time  Hypoglycemia Symptoms:  No hypoglycemia at this time  Current diabetes treatment:  Medtronic pump currently using NovoLog insulin.  He is using 30 g for a small meal, 45 g for medium size meal and 60 g for large meal when entering carbohydrates into the pump.    Blood glucose device:  OneID CGM  Blood glucose monitoring frequency:  Continuous per CGM  Blood glucose range/average:  The 14-day sensor report shows an average glucose of 154 mg/dL, with 77% in target range ( mgdL), 16% in the high range (181-250 mg/dL), 7% in the very high range (>250 mg/dL), 0% in the low range (54-70 mg/dL) and 0% in the very low range (<54 mg/dL).   Glucose Source: Device Reviewed  Diet:  Limits high carb/sweet foods, Avoids sugary drinks  Activity/Exercise:  None    Past Medical History:   Diagnosis Date    CHF (congestive heart failure)     Coronary artery disease involving native heart without angina pectoris      Nonobstructive coronary artery disease involving diagonal branch per cardiac catheterization done on 01/21/2021    Essential hypertension, benign     Hypercholesterolemia     ICD (implantable cardioverter-defibrillator), dual, in situ 09/22/2021    Nicotine dependence     Non-ischemic cardiomyopathy (CMS/HCC) 05/03/2021    Mildly dilated left ventricular cavity with severe global hypokinesis with estimated LV ejection fraction of       28%. On echo May 3, 2021    Peyronie's disease     Seizures     started 2021    Stroke     Type 1 diabetes mellitus with vascular disease     Type I diabetes mellitus, uncontrolled      Past Surgical History:   Procedure Laterality Date    CARDIAC CATHETERIZATION      CARDIAC ELECTROPHYSIOLOGY PROCEDURE N/A 06/15/2021    Procedure:  ICD SC new;  Surgeon: Regulo Coyne MD;  Location: [facility] INVASIVE LOCATION;  Service: Cardiovascular;  Laterality: N/A;    FRACTURE SURGERY Right     crushed heel injury with 14 fractures    OTHER SURGICAL HISTORY  06/10/2013    plication for treatment of pyronie's disease    VASECTOMY       Family History   Problem Relation Age of Onset    Diabetes Mother     Diabetes Father     Neuropathy Father      Social History     Socioeconomic History    Marital status:     Number of children: 2   Tobacco Use    Smoking status: Every Day     Current packs/day: 0.00     Average packs/day: 1 pack/day for 37.1 years (37.1 ttl pk-yrs)     Types: Cigarettes     Start date: 3/11/1986     Last attempt to quit: 2023     Years since quittin.8     Passive exposure: Past    Smokeless tobacco: Never   Vaping Use    Vaping status: Never Used   Substance and Sexual Activity    Alcohol use: Not Currently     Alcohol/week: 6.0 standard drinks of alcohol     Types: 6 Cans of beer per week     Comment: daily    Drug use: Never    Sexual activity: Not Currently     Partners: Female     Birth control/protection: Condom, Post-menopausal, Tubal ligation, Vasectomy, Partner of same sex, Pill     No Known Allergies    Current Outpatient Medications:     atorvastatin (LIPITOR) 80 MG tablet, TAKE 1 TABLET BY MOUTH DAILY, Disp: 90 tablet, Rfl: 3    carvedilol (COREG) 12.5 MG tablet, Take 1 tablet by mouth Every 12 (Twelve) Hours., Disp: , Rfl:     clopidogrel (PLAVIX) 75 MG tablet, TAKE 1 TABLET BY MOUTH DAILY, Disp: 90 tablet, Rfl: 3    Entresto  MG tablet, TAKE 1 TABLET BY MOUTH TWICE A DAY, Disp: 180 tablet, Rfl: 2    Glucagon (Baqsimi Two Pack) 3 MG/DOSE powder, 3 mg into the nostril(s) as directed by provider As Needed (Severe hypoglycemia)., Disp: 1 each, Rfl: 2    Insulin Aspart (NovoLOG) 100 UNIT/ML injection, Up to 100 per day per insulin pump, Disp: 90 mL, Rfl: 1    insulin patient supplied pump, Inject  under the  "skin into the appropriate area as directed Continuous. Type of Insulin: NOVOLOG Basal Dose:  12 AM to 6 AM - 2.15u/hr 6 AM to 3 PM - 1.9u/hr 3 PM to 12 AM - 2.115u/hr  Bolus Dose: CORRECTION RATIO 1:40 Prescriber:KATHERINE ROWLAND Phone number: Next refill: 4/14/23 MEDTRONIC PUMP, Disp: , Rfl:     pregabalin (LYRICA) 50 MG capsule, , Disp: , Rfl:     spironolactone (ALDACTONE) 25 MG tablet, TAKE 1 TABLET BY MOUTH EVERY DAY, Disp: 90 tablet, Rfl: 1    Objective     Vitals:    02/10/25 0857   BP: 140/74   BP Location: Left arm   Patient Position: Sitting   Cuff Size: Adult   Pulse: 73   Temp: 98 °F (36.7 °C)   TempSrc: Temporal   SpO2: 100%   Weight: 79.4 kg (175 lb)   Height: 170.2 cm (67.01\")     Body mass index is 27.4 kg/m².    Physical Exam  Constitutional:       Appearance: Normal appearance.      Comments: Overweight (BMI 25 - 29.9) Pt Current BMI = 27.4    HENT:      Head: Normocephalic and atraumatic.      Right Ear: External ear normal.      Left Ear: External ear normal.      Nose: Nose normal.   Eyes:      Extraocular Movements: Extraocular movements intact.      Conjunctiva/sclera: Conjunctivae normal.   Pulmonary:      Effort: Pulmonary effort is normal.   Musculoskeletal:         General: Normal range of motion.      Cervical back: Normal range of motion.   Skin:     General: Skin is warm and dry.   Neurological:      General: No focal deficit present.      Mental Status: He is alert and oriented to person, place, and time. Mental status is at baseline.   Psychiatric:         Mood and Affect: Mood normal.         Behavior: Behavior normal.         Thought Content: Thought content normal.         Judgment: Judgment normal.             Result Review :   The following data was reviewed by: ZEINAB Goel on 02/10/2025:    Most Recent A1C          2/10/2025    09:08   HGBA1C Most Recent   Hemoglobin A1C 7.6        A1C Last 3 Results          8/22/2024    09:58 11/21/2024    10:08 2/10/2025    09:08 "   HGBA1C Last 3 Results   Hemoglobin A1C 7.9  7.6  7.6      A1c collected in the office today is 7.6%, indicating Uncontrolled Type II diabetes.  This result is unchanged from the prior result of 7.6% collected on 11/21/24     Glucose   Date Value Ref Range Status   02/10/2025 142 (H) 70 - 99 mg/dL Final     Comment:     Serial Number: [id number]:  [id number]     Point of care glucose in the office today is within normal limits for nonfasting glucose                Diagnoses and all orders for this visit:    1. Uncontrolled type 1 diabetes mellitus with hyperglycemia (Primary)  -     POC Glycosylated Hemoglobin (Hb A1C)  -     POC Glucose    2. Type 1 diabetes mellitus with diabetic neuropathy    3. Insulin pump in place    4. Uses self-applied continuous glucose monitoring device        Assessment & Plan  1. Diabetes Mellitus.  His blood glucose levels have been stable, with 77% of readings within the target range. He is currently using a Medtronic pump and NovoLog insulin. His A1c level remains at 7.6, consistent with the previous reading. He was advised to maintain accurate carbohydrate counting to prevent hyperglycemic episodes. It was discussed that the insulin pump might become overly aggressive if the settings are adjusted, especially if he is underestimating his carbohydrate intake. He was encouraged to ensure sufficient insulin administration based on his portion sizes.          The patient will monitor his blood glucose levels per continuous glucose monitor.  If he develops problematic hyperglycemia or hypoglycemia or adverse drug reactions, he will contact the office for further instructions.        Follow Up     Return in about 3 months (around 5/10/2025) for Pump Eval, CGM Follow-up.    Patient was given instructions and counseling regarding his condition or for health maintenance advice. Please see specific information pulled into the AVS if appropriate.     Jennifer Delgado,  APRN  02/10/2025        Dictated Utilizing Dragon Dictation.  Please note that portions of this note were completed with a voice recognition program.  Part of this note may be an electronic transcription/translation of spoken language to printed text using the Dragon Dictation System.

## 2025-02-26 NOTE — ASSESSMENT & PLAN NOTE
Stable NYHA class II symptoms without any evidence of volume overload.  Continue current medication management with carvedilol, Entresto, and spironolactone.

## 2025-02-26 NOTE — ASSESSMENT & PLAN NOTE
he is stable without symptoms of angina.  Continue beta-blocker and statin.  He will also be on lifelong Plavix due to reoccurring strokes.

## 2025-02-26 NOTE — ASSESSMENT & PLAN NOTE
Blood pressure is well-controlled, he may continue current regiment with carvedilol, Entresto, and spironolactone.

## 2025-02-28 ENCOUNTER — TELEPHONE (OUTPATIENT)
Dept: CARDIOLOGY | Facility: CLINIC | Age: 55
End: 2025-02-28
Payer: COMMERCIAL

## 2025-02-28 RX ORDER — ATORVASTATIN CALCIUM 80 MG/1
80 TABLET, FILM COATED ORAL DAILY
Qty: 90 TABLET | Refills: 1 | Status: SHIPPED | OUTPATIENT
Start: 2025-02-28

## 2025-02-28 NOTE — TELEPHONE ENCOUNTER
[facility] received a fax that requires your attention. The document has been indexed to the patient’s chart for your review.      Reason for sending: EXTERNAL MEDICAL RECORD NOTIFICATION     Documents Description: ATORVASTATIN REFILL-[facility]-2.28.25    Name of Sender: [facility]     Date Indexed: 2.28.25

## 2025-03-03 ENCOUNTER — TELEPHONE (OUTPATIENT)
Dept: CARDIOLOGY | Facility: CLINIC | Age: 55
End: 2025-03-03
Payer: COMMERCIAL

## 2025-03-03 RX ORDER — CLOPIDOGREL BISULFATE 75 MG/1
75 TABLET ORAL DAILY
Qty: 90 TABLET | Refills: 3 | Status: SHIPPED | OUTPATIENT
Start: 2025-03-03

## 2025-03-03 NOTE — TELEPHONE ENCOUNTER
[facility] received a fax that requires your attention. The document has been indexed to the patient’s chart for your review.      Reason for sending: EXTERNAL MEDICAL RECORD NOTIFICATION     Documents Description: CLOPIDOGREL REFILL-[facility]-3.2.25    Name of Sender: [facility]     Date Indexed: 3.2.25

## 2025-05-12 ENCOUNTER — OFFICE VISIT (OUTPATIENT)
Dept: DIABETES SERVICES | Facility: HOSPITAL | Age: 55
End: 2025-05-12
Payer: COMMERCIAL

## 2025-05-12 VITALS
OXYGEN SATURATION: 99 % | HEIGHT: 67 IN | BODY MASS INDEX: 26.84 KG/M2 | SYSTOLIC BLOOD PRESSURE: 115 MMHG | WEIGHT: 171 LBS | DIASTOLIC BLOOD PRESSURE: 78 MMHG | HEART RATE: 74 BPM

## 2025-05-12 DIAGNOSIS — E66.3 OVERWEIGHT (BMI 25.0-29.9): ICD-10-CM

## 2025-05-12 DIAGNOSIS — E10.65 UNCONTROLLED TYPE 1 DIABETES MELLITUS WITH HYPERGLYCEMIA: Primary | ICD-10-CM

## 2025-05-12 DIAGNOSIS — E10.40 TYPE 1 DIABETES MELLITUS WITH DIABETIC NEUROPATHY: ICD-10-CM

## 2025-05-12 DIAGNOSIS — Z46.81 INSULIN PUMP TITRATION: ICD-10-CM

## 2025-05-12 DIAGNOSIS — Z97.8 USES SELF-APPLIED CONTINUOUS GLUCOSE MONITORING DEVICE: ICD-10-CM

## 2025-05-12 DIAGNOSIS — Z96.41 INSULIN PUMP IN PLACE: ICD-10-CM

## 2025-05-12 LAB
EXPIRATION DATE: ABNORMAL
GLUCOSE BLDC GLUCOMTR-MCNC: 123 MG/DL (ref 70–99)
HBA1C MFR BLD: 7.5 % (ref 4.5–5.7)
Lab: ABNORMAL

## 2025-05-12 PROCEDURE — G0463 HOSPITAL OUTPT CLINIC VISIT: HCPCS | Performed by: NURSE PRACTITIONER

## 2025-05-12 PROCEDURE — 83036 HEMOGLOBIN GLYCOSYLATED A1C: CPT | Performed by: NURSE PRACTITIONER

## 2025-05-12 PROCEDURE — 95251 CONT GLUC MNTR ANALYSIS I&R: CPT | Performed by: NURSE PRACTITIONER

## 2025-05-12 PROCEDURE — 82948 REAGENT STRIP/BLOOD GLUCOSE: CPT | Performed by: NURSE PRACTITIONER

## 2025-05-12 PROCEDURE — 99214 OFFICE O/P EST MOD 30 MIN: CPT | Performed by: NURSE PRACTITIONER

## 2025-05-12 RX ORDER — INSULIN ASPART 100 [IU]/ML
INJECTION, SOLUTION INTRAVENOUS; SUBCUTANEOUS
Qty: 90 ML | Refills: 1 | Status: SHIPPED | OUTPATIENT
Start: 2025-05-12

## 2025-05-12 NOTE — PROGRESS NOTES
Chief Complaint  Diabetes (Follow Up, Pump Eval. , CGM Eval., A1C/No recent eye exam)    Referred By: ZEINAB Onofre    Subjective          Patient or patient representative verbalized consent for the use of Ambient Listening during the visit with  ZEINAB Goel for chart documentation. 2025  10:12 EDT    Demetrio Mariano presents to [facility] for insulin pump management    History of Present Illness    Visit type:  follow-up  Diabetes type:  Type 1  Current diabetes status/concerns/issues:     History of Present Illness  The patient presents for evaluation of diabetes mellitus.    He is currently utilizing a Medtronic insulin pump with NovoLog insulin. Over the past two weeks, his glucose average has been 139 mg/dL, with 74% of his blood sugar readings in the target range. He has been self-preparing his meals daily. He has been attempting to augment his insulin or bolus dosage. He experiences hypoglycemic episodes, which induce shakiness and are followed by hyperglycemia. His eating habits are irregular, often consuming only one meal per day. His primary care physician reported an A1c level of 8.12 two weeks ago. He recently transitioned to a new pump due to battery and casing issues.     He had a consultation with his family doctor a few weeks ago for medication renewal. His pregabalin prescription was not renewed due to its narcotic classification, and he was advised to seek pain management. He declined this suggestion and opted to resume Tylenol. His pregabalin prescription  2.5 weeks ago, and he visited his doctor 2 weeks ago.       Current Diabetes symptoms:    Polyuria: No   Polydipsia: No   Polyphagia: No   Blurred vision: No   Excessive fatigue: No  Known Diabetes complications:  Neuropathy: Pain and Numbness; Location: Feet and Legs  Renal: Normal eGFR per current labs and Microalbuminuria - NEGATIVE  Eyes: No current eye exam available in  record; Location: N/A; Date of Last Exam: N/A  Amputation/Wounds: None  GI: None  Cardiovascular: CAD, CHF, Cardiomyopathy, and CVA (History of)  ED: Patient Reported  Other: None  Hypoglycemia:  Level 1 hypoglycemia (54 mg/dL - 70 mg/dL); Frequency - 1% per CGM  Hypoglycemia Symptoms:  shaking/tremors  Current diabetes treatment:  Medtronic pump currently using NovoLog insulin.  He is using 30 g for a small meal, 45 g for medium size meal and 60 g for large meal when entering carbohydrates into the pump.    Blood glucose device:  Chute CGM  Blood glucose monitoring frequency:  Continuous per CGM  Blood glucose range/average:  The 14-day sensor report shows an average glucose of 139 mg/dL, with 74% in target range ( mgdL), 20% in the high range (181-250 mg/dL), 5% in the very high range (>250 mg/dL), 1% in the low range (54-70 mg/dL) and 0% in the very low range (<54 mg/dL).   Glucose Source: Device Reviewed  Diet:  Carbohydrate Counting, Limits high carb/sweet foods, Avoids sugary drinks  Activity/Exercise:  None    Past Medical History:   Diagnosis Date    CHF (congestive heart failure)     Coronary artery disease involving native heart without angina pectoris      Nonobstructive coronary artery disease involving diagonal branch per cardiac catheterization done on 01/21/2021    Essential hypertension, benign     Hypercholesterolemia     ICD (implantable cardioverter-defibrillator), dual, in situ 09/22/2021    Nicotine dependence     Non-ischemic cardiomyopathy (CMS/HCC) 05/03/2021    Mildly dilated left ventricular cavity with severe global hypokinesis with estimated LV ejection fraction of       28%. On echo May 3, 2021    Peyronie's disease     Seizures     started 2021    Stroke     Type 1 diabetes mellitus with vascular disease     Type I diabetes mellitus, uncontrolled      Past Surgical History:   Procedure Laterality Date    CARDIAC CATHETERIZATION      CARDIAC ELECTROPHYSIOLOGY PROCEDURE N/A  06/15/2021    Procedure: ICD SC new;  Surgeon: Regulo Coyne MD;  Location: [facility] INVASIVE LOCATION;  Service: Cardiovascular;  Laterality: N/A;    FRACTURE SURGERY Right     crushed heel injury with 14 fractures    OTHER SURGICAL HISTORY  06/10/2013    plication for treatment of pyronie's disease    VASECTOMY       Family History   Problem Relation Age of Onset    Diabetes Mother     Diabetes Father     Neuropathy Father      Social History     Socioeconomic History    Marital status:     Number of children: 2   Tobacco Use    Smoking status: Every Day     Current packs/day: 0.00     Average packs/day: 1 pack/day for 37.1 years (37.1 ttl pk-yrs)     Types: Cigarettes     Start date: 3/11/1986     Last attempt to quit: 2023     Years since quittin.1     Passive exposure: Past    Smokeless tobacco: Never   Vaping Use    Vaping status: Never Used   Substance and Sexual Activity    Alcohol use: Not Currently     Alcohol/week: 6.0 standard drinks of alcohol     Types: 6 Cans of beer per week     Comment: daily    Drug use: Never    Sexual activity: Not Currently     Partners: Female     Birth control/protection: Condom, Post-menopausal, Tubal ligation, Vasectomy, Partner of same sex, Pill     No Known Allergies    Current Outpatient Medications:     atorvastatin (LIPITOR) 80 MG tablet, Take 1 tablet by mouth Daily., Disp: 90 tablet, Rfl: 1    carvedilol (COREG) 12.5 MG tablet, Take 1 tablet by mouth Every 12 (Twelve) Hours., Disp: , Rfl:     Entresto  MG tablet, TAKE 1 TABLET BY MOUTH TWICE A DAY, Disp: 180 tablet, Rfl: 2    Insulin Aspart (NovoLOG) 100 UNIT/ML injection, Up to 100 per day per insulin pump, Disp: 90 mL, Rfl: 1    Glucagon (Baqsimi Two Pack) 3 MG/DOSE powder, 3 mg into the nostril(s) as directed by provider As Needed (Severe hypoglycemia)., Disp: 1 each, Rfl: 2    insulin patient supplied pump, Inject  under the skin into the appropriate area as directed Continuous. Type of  "Insulin: NOVOLOG Basal Dose:  12 AM to 6 AM - 2.15u/hr 6 AM to 3 PM - 1.9u/hr 3 PM to 12 AM - 2.115u/hr  Bolus Dose: CORRECTION RATIO 1:40 Prescriber:KATHERINE ROWLAND Phone number: Next refill: 4/14/23 MEDTRONIC PUMP, Disp: , Rfl:     pregabalin (LYRICA) 50 MG capsule, , Disp: , Rfl:     spironolactone (ALDACTONE) 25 MG tablet, TAKE 1 TABLET BY MOUTH EVERY DAY, Disp: 90 tablet, Rfl: 1    Objective     Vitals:    05/12/25 0939   BP: 115/78   Pulse: 74   SpO2: 99%   Weight: 77.6 kg (171 lb)   Height: 170.2 cm (67.01\")     Body mass index is 26.77 kg/m².    Physical Exam  Constitutional:       Appearance: Normal appearance.      Comments: Overweight (BMI 25 - 29.9) Pt Current BMI = 26.77    HENT:      Head: Normocephalic and atraumatic.      Right Ear: External ear normal.      Left Ear: External ear normal.      Nose: Nose normal.   Eyes:      Extraocular Movements: Extraocular movements intact.      Conjunctiva/sclera: Conjunctivae normal.   Pulmonary:      Effort: Pulmonary effort is normal.   Musculoskeletal:         General: Normal range of motion.      Cervical back: Normal range of motion.   Skin:     General: Skin is warm and dry.   Neurological:      General: No focal deficit present.      Mental Status: He is alert and oriented to person, place, and time. Mental status is at baseline.   Psychiatric:         Mood and Affect: Mood normal.         Behavior: Behavior normal.         Thought Content: Thought content normal.         Judgment: Judgment normal.             Result Review :   The following data was reviewed by: ZEINAB Goel on 05/12/2025:    Most Recent A1C          5/12/2025    09:51   HGBA1C Most Recent   Hemoglobin A1C 7.5        A1C Last 3 Results          11/21/2024    10:08 2/10/2025    09:08 5/12/2025    09:51   HGBA1C Last 3 Results   Hemoglobin A1C 7.6  7.6  7.5      A1c collected in the office today is 7.5%, indicating Uncontrolled Type I diabetes.  This result is down from the " prior result of 2/10/25% collected on 7.6     Glucose   Date Value Ref Range Status   05/12/2025 123 (H) 70 - 99 mg/dL Final     Comment:     Serial Number: [id number]:  [id number]     Point of care glucose in the office today is within normal limits for nonfasting glucose                Diagnoses and all orders for this visit:    1. Uncontrolled type 1 diabetes mellitus with hyperglycemia (Primary)  -     POC Glycosylated Hemoglobin (Hb A1C)  -     POC Glucose  -     Insulin Aspart (NovoLOG) 100 UNIT/ML injection; Up to 100 per day per insulin pump  Dispense: 90 mL; Refill: 1    2. Type 1 diabetes mellitus with diabetic neuropathy    3. Insulin pump in place    4. Insulin pump titration    5. Uses self-applied continuous glucose monitoring device    6. Overweight (BMI 25.0-29.9)        Assessment & Plan  1. Diabetes mellitus.  - Glucose levels have been well-managed over the past two weeks, with an average of 139 and 74% of readings within the target range. Instances of hypoglycemia have been noted, which he tends to overcorrect, leading to subsequent hyperglycemia.  - A1c level is currently 7.5, a slight decrease from 7.6 in 02/2025. Pre-meal glucose levels average around 145, while post-meal levels average 225, indicating a need for adjustment in his carbohydrate ratio.  - Advised to consume approximately 15 g of carbohydrates during hypoglycemic episodes and to avoid overcorrection. Discussed the potential decrease in A1c to 7.0 if the current regimen is maintained.  - Carbohydrate ratio adjusted from 1:7 until 5 PM, and from 5 PM to midnight, reduced to 1:5 to increase insulin administration when carbohydrates are consumed. If this results in hypoglycemia, the ratio will be adjusted to 1:6. Insulin prescription refill provided and sent to pharmacy.          The patient will monitor his blood glucose levels per continuous glucose monitor.  If he develops problematic hyperglycemia or hypoglycemia or adverse  drug reactions, he will contact the office for further instructions.        Follow Up     Return in about 3 months (around 8/12/2025) for Pump Eval, CGM Follow-up.    Patient was given instructions and counseling regarding his condition or for health maintenance advice. Please see specific information pulled into the AVS if appropriate.     Jennifer Delgado, APRN  05/12/2025        Dictated Utilizing Dragon Dictation.  Please note that portions of this note were completed with a voice recognition program.  Part of this note may be an electronic transcription/translation of spoken language to printed text using the Dragon Dictation System.

## 2025-05-30 ENCOUNTER — TELEPHONE (OUTPATIENT)
Dept: DIABETES SERVICES | Facility: HOSPITAL | Age: 55
End: 2025-05-30
Payer: COMMERCIAL

## 2025-05-30 NOTE — TELEPHONE ENCOUNTER
Called patient at providers request    We received a Omnipod intro kit Rx request from [facility] pharmacy. Per patient's last chart note he is currently using a Medtronic pump.    Patient states he is wanting to experience and trial a tubeless pump to see if he will like it.    Advised patient if provider signs the order he will have to call and schedule pump training when he receives his kit.     Patient verbalized understanding and provider notified of response.

## 2025-06-05 DIAGNOSIS — I42.8 NON-ISCHEMIC CARDIOMYOPATHY: Chronic | ICD-10-CM

## 2025-06-05 RX ORDER — SACUBITRIL AND VALSARTAN 97; 103 MG/1; MG/1
1 TABLET, FILM COATED ORAL 2 TIMES DAILY
Qty: 180 TABLET | Refills: 0 | Status: SHIPPED | OUTPATIENT
Start: 2025-06-05

## 2025-06-20 DIAGNOSIS — I42.8 NON-ISCHEMIC CARDIOMYOPATHY: Chronic | ICD-10-CM

## 2025-06-20 RX ORDER — SPIRONOLACTONE 25 MG/1
25 TABLET ORAL DAILY
Qty: 90 TABLET | Refills: 0 | Status: SHIPPED | OUTPATIENT
Start: 2025-06-20

## 2025-06-26 LAB
MDC_IDC_PG_IMPLANT_DTM: NORMAL
MDC_IDC_PG_MFG: NORMAL
MDC_IDC_PG_MODEL: NORMAL
MDC_IDC_PG_SERIAL: NORMAL
MDC_IDC_PG_TYPE: NORMAL
MDC_IDC_SESS_DTM: NORMAL
MDC_IDC_SESS_TYPE: NORMAL

## 2025-06-27 ENCOUNTER — TELEPHONE (OUTPATIENT)
Dept: CARDIOLOGY | Facility: CLINIC | Age: 55
End: 2025-06-27
Payer: COMMERCIAL

## 2025-08-04 ENCOUNTER — OFFICE VISIT (OUTPATIENT)
Dept: CARDIOLOGY | Facility: CLINIC | Age: 55
End: 2025-08-04
Payer: COMMERCIAL

## 2025-08-04 VITALS
DIASTOLIC BLOOD PRESSURE: 68 MMHG | WEIGHT: 165 LBS | BODY MASS INDEX: 25.9 KG/M2 | HEART RATE: 80 BPM | SYSTOLIC BLOOD PRESSURE: 115 MMHG | HEIGHT: 67 IN

## 2025-08-04 DIAGNOSIS — I10 ESSENTIAL HYPERTENSION: ICD-10-CM

## 2025-08-04 DIAGNOSIS — I25.10 CORONARY ARTERY DISEASE INVOLVING NATIVE CORONARY ARTERY OF NATIVE HEART WITHOUT ANGINA PECTORIS: ICD-10-CM

## 2025-08-04 DIAGNOSIS — Z95.810 ICD (IMPLANTABLE CARDIOVERTER-DEFIBRILLATOR), DUAL, IN SITU: Primary | Chronic | ICD-10-CM

## 2025-08-04 DIAGNOSIS — E78.2 MIXED HYPERLIPIDEMIA: ICD-10-CM

## 2025-08-04 DIAGNOSIS — I42.8 NON-ISCHEMIC CARDIOMYOPATHY: Chronic | ICD-10-CM

## 2025-08-04 RX ORDER — CLOPIDOGREL BISULFATE 75 MG/1
1 TABLET ORAL DAILY
COMMUNITY
Start: 2025-06-04

## 2025-08-15 ENCOUNTER — OFFICE VISIT (OUTPATIENT)
Dept: DIABETES SERVICES | Facility: HOSPITAL | Age: 55
End: 2025-08-15
Payer: COMMERCIAL

## 2025-08-15 VITALS
WEIGHT: 166.8 LBS | SYSTOLIC BLOOD PRESSURE: 126 MMHG | RESPIRATION RATE: 16 BRPM | DIASTOLIC BLOOD PRESSURE: 72 MMHG | HEART RATE: 81 BPM | HEIGHT: 67 IN | OXYGEN SATURATION: 99 % | BODY MASS INDEX: 26.18 KG/M2

## 2025-08-15 DIAGNOSIS — Z96.41 INSULIN PUMP IN PLACE: ICD-10-CM

## 2025-08-15 DIAGNOSIS — E10.40 TYPE 1 DIABETES MELLITUS WITH DIABETIC NEUROPATHY: ICD-10-CM

## 2025-08-15 DIAGNOSIS — Z97.8 USES SELF-APPLIED CONTINUOUS GLUCOSE MONITORING DEVICE: ICD-10-CM

## 2025-08-15 DIAGNOSIS — E10.65 UNCONTROLLED TYPE 1 DIABETES MELLITUS WITH HYPERGLYCEMIA: Primary | ICD-10-CM

## 2025-08-15 LAB
EXPIRATION DATE: ABNORMAL
GLUCOSE BLDC GLUCOMTR-MCNC: 159 MG/DL (ref 70–99)
HBA1C MFR BLD: 7.4 % (ref 4.5–5.7)
Lab: ABNORMAL

## 2025-08-15 PROCEDURE — 95251 CONT GLUC MNTR ANALYSIS I&R: CPT | Performed by: NURSE PRACTITIONER

## 2025-08-15 PROCEDURE — 82948 REAGENT STRIP/BLOOD GLUCOSE: CPT | Performed by: NURSE PRACTITIONER

## 2025-08-15 PROCEDURE — 99214 OFFICE O/P EST MOD 30 MIN: CPT | Performed by: NURSE PRACTITIONER

## 2025-08-15 PROCEDURE — G0463 HOSPITAL OUTPT CLINIC VISIT: HCPCS | Performed by: NURSE PRACTITIONER

## 2025-08-15 PROCEDURE — 83036 HEMOGLOBIN GLYCOSYLATED A1C: CPT | Performed by: NURSE PRACTITIONER

## (undated) DEVICE — ST ACC MICROPUNCTURE .018 TRANSLSS/PLAT/TP 4F/10CM 21G/10CM

## (undated) DEVICE — TUBING, SUCTION, 1/4" X 10', STRAIGHT: Brand: MEDLINE

## (undated) DEVICE — ANTIBACTERIAL UNDYED BRAIDED (POLYGLACTIN 910), SYNTHETIC ABSORBABLE SUTURE: Brand: COATED VICRYL

## (undated) DEVICE — 3M™ STERI-STRIP™ REINFORCED ADHESIVE SKIN CLOSURES, R1546, 1/4 IN X 4 IN (6 MM X 100 MM), 10 STRIPS/ENVELOPE: Brand: 3M™ STERI-STRIP™

## (undated) DEVICE — SUT SILK 0/0 CT2 18IN C027D

## (undated) DEVICE — 8 FOOT DISPOSABLE BI-VENTRICULAR PACING CABLE WITH SAFE CONNECT / ALLIGATOR CLIP

## (undated) DEVICE — PENCL E/S HNDSWCH ROCKR CB

## (undated) DEVICE — TP SXN YANKR BULB STRL

## (undated) DEVICE — UNDYED BRAIDED (POLYGLACTIN 910), SYNTHETIC ABSORBABLE SUTURE: Brand: COATED VICRYL

## (undated) DEVICE — DRSNG SURG AQUACEL AG 9X15CM

## (undated) DEVICE — INTRO TEAR AWAY/LVD W/SD PRT 8F 13CM